# Patient Record
Sex: MALE | Race: WHITE | NOT HISPANIC OR LATINO | Employment: FULL TIME | URBAN - NONMETROPOLITAN AREA
[De-identification: names, ages, dates, MRNs, and addresses within clinical notes are randomized per-mention and may not be internally consistent; named-entity substitution may affect disease eponyms.]

---

## 2019-04-15 ENCOUNTER — NON-PROVIDER VISIT (OUTPATIENT)
Dept: URGENT CARE | Facility: PHYSICIAN GROUP | Age: 49
End: 2019-04-15

## 2019-04-15 DIAGNOSIS — Z02.1 PRE-EMPLOYMENT DRUG SCREENING: ICD-10-CM

## 2019-04-15 LAB
AMP AMPHETAMINE: NORMAL
COC COCAINE: NORMAL
INT CON NEG: NORMAL
INT CON POS: NORMAL
MET METHAMPHETAMINES: NORMAL
OPI OPIATES: NORMAL
PCP PHENCYCLIDINE: NORMAL
POC DRUG COMMENT 753798-OCCUPATIONAL HEALTH: NEGATIVE
THC: NORMAL

## 2019-04-15 PROCEDURE — 80305 DRUG TEST PRSMV DIR OPT OBS: CPT | Performed by: PHYSICIAN ASSISTANT

## 2019-08-27 ENCOUNTER — APPOINTMENT (OUTPATIENT)
Dept: URGENT CARE | Facility: PHYSICIAN GROUP | Age: 49
End: 2019-08-27

## 2022-02-20 ENCOUNTER — HOSPITAL ENCOUNTER (EMERGENCY)
Facility: MEDICAL CENTER | Age: 52
End: 2022-02-20
Attending: EMERGENCY MEDICINE
Payer: COMMERCIAL

## 2022-02-20 VITALS
HEIGHT: 72 IN | TEMPERATURE: 96.9 F | DIASTOLIC BLOOD PRESSURE: 77 MMHG | HEART RATE: 97 BPM | WEIGHT: 140 LBS | RESPIRATION RATE: 18 BRPM | SYSTOLIC BLOOD PRESSURE: 112 MMHG | BODY MASS INDEX: 18.96 KG/M2 | OXYGEN SATURATION: 99 %

## 2022-02-20 DIAGNOSIS — T50.901A ACCIDENTAL DRUG INGESTION, INITIAL ENCOUNTER: ICD-10-CM

## 2022-02-20 LAB — POC BREATHALIZER: 0 PERCENT (ref 0–0.01)

## 2022-02-20 PROCEDURE — 99284 EMERGENCY DEPT VISIT MOD MDM: CPT

## 2022-02-20 PROCEDURE — 302970 POC BREATHALIZER: Performed by: EMERGENCY MEDICINE

## 2022-02-21 NOTE — ED NOTES
Pt sleeping at this time. Pt is in no apparent distress; breathing is non-labored with equal rise and fall of chest wall. VS stable and will continue to monitor pt.

## 2022-02-21 NOTE — ED NOTES
Handoff report received from Pari MARK.    Pt sleeping. No apparent distress noted with non-labored breathing. Noted equal rise and fall of chest wall. VS stable and will continue to monitor pt.

## 2022-02-21 NOTE — ED NOTES
Pt remains asleep. No apparent acute distress noted and breathing is non-labored. Noted equal rise and fall of chest wall. VS stable and will continue to monitor pt.
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
## 2022-02-21 NOTE — ED NOTES
"Pt sleeping but easily woken up with verbal stimuli. Pt stated feeling \"a little bit better\" at this time. Pt currently denies any pain. Pt is in no apparent distress and breathing is non-labored. VS stable and will continue to monitor pt.  "

## 2022-02-21 NOTE — ED NOTES
Pt cleared for discharge by ERP.    Pt now alert and awake. Pt stated feeling better and verbalized wanting to go home. Pt discharged in stable condition. Discharge instructions given and explained to pt; verbalized understanding. Pt ambulated out of the ER with a steady and stable gait.
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
## 2022-05-12 ENCOUNTER — APPOINTMENT (OUTPATIENT)
Dept: RADIOLOGY | Facility: MEDICAL CENTER | Age: 52
End: 2022-05-12
Attending: EMERGENCY MEDICINE
Payer: COMMERCIAL

## 2022-05-12 ENCOUNTER — HOSPITAL ENCOUNTER (EMERGENCY)
Facility: MEDICAL CENTER | Age: 52
End: 2022-05-12
Attending: EMERGENCY MEDICINE
Payer: COMMERCIAL

## 2022-05-12 VITALS
SYSTOLIC BLOOD PRESSURE: 114 MMHG | WEIGHT: 130 LBS | RESPIRATION RATE: 15 BRPM | OXYGEN SATURATION: 93 % | DIASTOLIC BLOOD PRESSURE: 66 MMHG | HEART RATE: 97 BPM | HEIGHT: 68 IN | TEMPERATURE: 98.8 F | BODY MASS INDEX: 19.7 KG/M2

## 2022-05-12 DIAGNOSIS — D72.829 LEUKOCYTOSIS, UNSPECIFIED TYPE: ICD-10-CM

## 2022-05-12 DIAGNOSIS — R07.9 CHEST PAIN, UNSPECIFIED TYPE: ICD-10-CM

## 2022-05-12 DIAGNOSIS — R11.2 NON-INTRACTABLE VOMITING WITH NAUSEA, UNSPECIFIED VOMITING TYPE: ICD-10-CM

## 2022-05-12 DIAGNOSIS — L03.116 CELLULITIS OF LEFT LOWER EXTREMITY: ICD-10-CM

## 2022-05-12 LAB
ALBUMIN SERPL BCP-MCNC: 3.4 G/DL (ref 3.2–4.9)
ALBUMIN/GLOB SERPL: 1.4 G/DL
ALP SERPL-CCNC: 62 U/L (ref 30–99)
ALT SERPL-CCNC: 12 U/L (ref 2–50)
ANION GAP SERPL CALC-SCNC: 11 MMOL/L (ref 7–16)
AST SERPL-CCNC: 15 U/L (ref 12–45)
BASOPHILS # BLD AUTO: 0.2 % (ref 0–1.8)
BASOPHILS # BLD: 0.06 K/UL (ref 0–0.12)
BILIRUB SERPL-MCNC: 0.5 MG/DL (ref 0.1–1.5)
BUN SERPL-MCNC: 19 MG/DL (ref 8–22)
CALCIUM SERPL-MCNC: 8.4 MG/DL (ref 8.5–10.5)
CHLORIDE SERPL-SCNC: 95 MMOL/L (ref 96–112)
CO2 SERPL-SCNC: 24 MMOL/L (ref 20–33)
CREAT SERPL-MCNC: 0.95 MG/DL (ref 0.5–1.4)
EKG IMPRESSION: NORMAL
EOSINOPHIL # BLD AUTO: 0.25 K/UL (ref 0–0.51)
EOSINOPHIL NFR BLD: 1 % (ref 0–6.9)
ERYTHROCYTE [DISTWIDTH] IN BLOOD BY AUTOMATED COUNT: 38.8 FL (ref 35.9–50)
ETHANOL BLD-MCNC: <10.1 MG/DL
FLUAV RNA SPEC QL NAA+PROBE: NEGATIVE
FLUBV RNA SPEC QL NAA+PROBE: NEGATIVE
GFR SERPLBLD CREATININE-BSD FMLA CKD-EPI: 96 ML/MIN/1.73 M 2
GLOBULIN SER CALC-MCNC: 2.4 G/DL (ref 1.9–3.5)
GLUCOSE SERPL-MCNC: 120 MG/DL (ref 65–99)
HCT VFR BLD AUTO: 40.5 % (ref 42–52)
HGB BLD-MCNC: 14 G/DL (ref 14–18)
IMM GRANULOCYTES # BLD AUTO: 0.22 K/UL (ref 0–0.11)
IMM GRANULOCYTES NFR BLD AUTO: 0.9 % (ref 0–0.9)
LIPASE SERPL-CCNC: 11 U/L (ref 11–82)
LYMPHOCYTES # BLD AUTO: 1.69 K/UL (ref 1–4.8)
LYMPHOCYTES NFR BLD: 6.9 % (ref 22–41)
MCH RBC QN AUTO: 28.6 PG (ref 27–33)
MCHC RBC AUTO-ENTMCNC: 34.6 G/DL (ref 33.7–35.3)
MCV RBC AUTO: 82.7 FL (ref 81.4–97.8)
MONOCYTES # BLD AUTO: 1.63 K/UL (ref 0–0.85)
MONOCYTES NFR BLD AUTO: 6.6 % (ref 0–13.4)
NEUTROPHILS # BLD AUTO: 20.73 K/UL (ref 1.82–7.42)
NEUTROPHILS NFR BLD: 84.4 % (ref 44–72)
NRBC # BLD AUTO: 0 K/UL
NRBC BLD-RTO: 0 /100 WBC
PLATELET # BLD AUTO: 282 K/UL (ref 164–446)
PMV BLD AUTO: 8 FL (ref 9–12.9)
POTASSIUM SERPL-SCNC: 3.7 MMOL/L (ref 3.6–5.5)
PROT SERPL-MCNC: 5.8 G/DL (ref 6–8.2)
RBC # BLD AUTO: 4.9 M/UL (ref 4.7–6.1)
RSV RNA SPEC QL NAA+PROBE: NEGATIVE
SARS-COV-2 RNA RESP QL NAA+PROBE: NOTDETECTED
SODIUM SERPL-SCNC: 130 MMOL/L (ref 135–145)
SPECIMEN SOURCE: NORMAL
TROPONIN T SERPL-MCNC: <6 NG/L (ref 6–19)
TROPONIN T SERPL-MCNC: <6 NG/L (ref 6–19)
WBC # BLD AUTO: 24.6 K/UL (ref 4.8–10.8)

## 2022-05-12 PROCEDURE — 80053 COMPREHEN METABOLIC PANEL: CPT

## 2022-05-12 PROCEDURE — C9803 HOPD COVID-19 SPEC COLLECT: HCPCS | Performed by: EMERGENCY MEDICINE

## 2022-05-12 PROCEDURE — 85025 COMPLETE CBC W/AUTO DIFF WBC: CPT

## 2022-05-12 PROCEDURE — 83690 ASSAY OF LIPASE: CPT

## 2022-05-12 PROCEDURE — 0241U HCHG SARS-COV-2 COVID-19 NFCT DS RESP RNA 4 TRGT MIC: CPT

## 2022-05-12 PROCEDURE — 82077 ASSAY SPEC XCP UR&BREATH IA: CPT

## 2022-05-12 PROCEDURE — 84484 ASSAY OF TROPONIN QUANT: CPT

## 2022-05-12 PROCEDURE — 71045 X-RAY EXAM CHEST 1 VIEW: CPT

## 2022-05-12 PROCEDURE — 99285 EMERGENCY DEPT VISIT HI MDM: CPT

## 2022-05-12 PROCEDURE — 36415 COLL VENOUS BLD VENIPUNCTURE: CPT

## 2022-05-12 PROCEDURE — A9270 NON-COVERED ITEM OR SERVICE: HCPCS | Performed by: EMERGENCY MEDICINE

## 2022-05-12 PROCEDURE — 96374 THER/PROPH/DIAG INJ IV PUSH: CPT

## 2022-05-12 PROCEDURE — 93005 ELECTROCARDIOGRAM TRACING: CPT | Performed by: EMERGENCY MEDICINE

## 2022-05-12 PROCEDURE — 93005 ELECTROCARDIOGRAM TRACING: CPT

## 2022-05-12 PROCEDURE — 700102 HCHG RX REV CODE 250 W/ 637 OVERRIDE(OP): Performed by: EMERGENCY MEDICINE

## 2022-05-12 PROCEDURE — 96375 TX/PRO/DX INJ NEW DRUG ADDON: CPT

## 2022-05-12 PROCEDURE — 700111 HCHG RX REV CODE 636 W/ 250 OVERRIDE (IP): Performed by: EMERGENCY MEDICINE

## 2022-05-12 RX ORDER — SUCRALFATE 1 G/1
1 TABLET ORAL
Qty: 56 TABLET | Refills: 0 | Status: SHIPPED | OUTPATIENT
Start: 2022-05-12 | End: 2022-05-12 | Stop reason: SDUPTHER

## 2022-05-12 RX ORDER — FAMOTIDINE 20 MG/1
20 TABLET, FILM COATED ORAL 2 TIMES DAILY
Qty: 28 TABLET | Refills: 0 | Status: SHIPPED | OUTPATIENT
Start: 2022-05-12 | End: 2022-05-12 | Stop reason: SDUPTHER

## 2022-05-12 RX ORDER — FAMOTIDINE 20 MG/1
20 TABLET, FILM COATED ORAL 2 TIMES DAILY
Qty: 28 TABLET | Refills: 0 | Status: SHIPPED | OUTPATIENT
Start: 2022-05-12 | End: 2022-05-26

## 2022-05-12 RX ORDER — SUCRALFATE 1 G/1
1 TABLET ORAL
Qty: 56 TABLET | Refills: 0 | Status: SHIPPED | OUTPATIENT
Start: 2022-05-12 | End: 2022-05-26

## 2022-05-12 RX ORDER — CEPHALEXIN 500 MG/1
500 CAPSULE ORAL 4 TIMES DAILY
Qty: 20 CAPSULE | Refills: 0 | Status: SHIPPED | OUTPATIENT
Start: 2022-05-12 | End: 2022-05-17

## 2022-05-12 RX ORDER — CEPHALEXIN 500 MG/1
500 CAPSULE ORAL 4 TIMES DAILY
Qty: 20 CAPSULE | Refills: 0 | Status: SHIPPED | OUTPATIENT
Start: 2022-05-12 | End: 2022-05-12 | Stop reason: SDUPTHER

## 2022-05-12 RX ORDER — ONDANSETRON 2 MG/ML
4 INJECTION INTRAMUSCULAR; INTRAVENOUS ONCE
Status: COMPLETED | OUTPATIENT
Start: 2022-05-12 | End: 2022-05-12

## 2022-05-12 RX ADMIN — LIDOCAINE HYDROCHLORIDE 30 ML: 20 SOLUTION OROPHARYNGEAL at 02:00

## 2022-05-12 RX ADMIN — FAMOTIDINE 20 MG: 10 INJECTION INTRAVENOUS at 02:00

## 2022-05-12 RX ADMIN — ONDANSETRON 4 MG: 2 INJECTION INTRAMUSCULAR; INTRAVENOUS at 02:00

## 2022-05-12 NOTE — ED NOTES
Pt BIB [facility] via gurney to [facility]. Pt transferred over to [facility] via lift assist. Pt in gown and on monitor. EKG completed and presented to ERP.

## 2022-05-12 NOTE — ED PROVIDER NOTES
ED Provider Note    CHIEF COMPLAINT  Chief Complaint   Patient presents with   • Chest Pain     HPI  Patient is a 52-year-old male with a history of bipolar disorder and chronic skin psoriasis who presents emergency room for chest pain.  Patient states that approximately 2 hours ago he was having some difficulty getting to sleep and felt this midline chest discomfort.  He describes it as having varying characteristics including a intermittent burning sensation, aching sensation.  He states not radiating, occasionally is associated with upper abdominal discomfort though it has not been tonight.  He does not recall any associations with food ingestion and says that he has this pain roughly once a month and is been self resolved.  He is not had any exertional chest discomfort, no chest pressure sensations, no shortness of breath when going up flights of stairs or running.  He has no history of cardiac disease, is a non-smoker and denies any family history of cardiac disease.    Patient incidentally reports that he is had several sick exposures at the [facility], has had a nonproductive cough, some intermittent body aches prior to the chest pain starting.  He occasionally has chest wall pain in a different distribution with coughing episodes but says the pain from was  Different and felt more severe than prior episodes, prompting him to come in via EMS.    PPE Note: I personally donned full PPE for all patient encounters during this visit, including being clean-shaven with an N95 respirator mask, gloves, and goggles.     REVIEW OF SYSTEMS  See HPI for further details. All other systems are negative.     PAST MEDICAL HISTORY   has a past medical history of Bipolar 2 disorder (HCC) (2/23/2012) and Psoriasis.    SOCIAL HISTORY  Social History     Tobacco Use   • Smoking status: Former Smoker     Types: Cigarettes   • Smokeless tobacco: Never Used   Vaping Use   • Vaping Use: Never used   Substance and Sexual Activity   •  "Alcohol use: No   • Drug use: Yes     Comment: marijuana    • Sexual activity: Yes     Partners: Female     SURGICAL HISTORY  patient denies any surgical history    CURRENT MEDICATIONS  Home Medications    **Home medications have not yet been reviewed for this encounter**       ALLERGIES  No Known Allergies    PHYSICAL EXAM  VITAL SIGNS: /64   Pulse 90   Temp 36.8 °C (98.2 °F) (Temporal)   Resp (!) 21   Ht 1.727 m (5' 8\")   Wt 59 kg (130 lb)   SpO2 95%   BMI 19.77 kg/m²   Pulse ox interpretation: I interpret this pulse ox as normal.  Genl: M sitting in gurney comfortably, speaking clearly, appears in no acute distress  Head: NC/AT  ENT: Mucous membranes slightly dry, posterior pharynx clear, uvula midline, nares patent bilaterally   Eyes: Normal sclera, pupils equal round reactive to light  Neck: Supple, FROM, no LAD appreciated  Pulmonary: Lungs are clear to auscultation bilaterally  Chest: No TTP  CV:  RRR, no murmur appreciated, pulses 2+ in both upper and lower extremities,  Abdomen: soft, epigastric discomfort, no true Cevallos sign, no McBurney's point tenderness. ND; no rebound/guarding, no masses palpated, no HSM   : no CVA or suprapubic tenderness  Musculoskeletal: Pain free ROM of the neck. Moving upper and lower extremities and spontaneous in coordinated fashion  Neuro: A&Ox4 (person, place, time, situation), speech fluent, gait steady, no focal deficits appreciated  Skin: Left lower extremity has erythematous skin lesions, skin ulcerations without purulence, no bony tenderness on palpation.  No pallor or jaundice.  No cyanosis.  Warm and dry.     DIAGNOSTIC STUDIES / PROCEDURES    EKG  Results for orders placed or performed during the hospital encounter of 05/12/22   EKG   Result Value Ref Range    Report       [facility]    Test Date:  2022-05-12  Pt Name:    LUIS MANUEL ORTEGA              Department: ER  MRN:        [id number]                      Room:       " RD 07  Gender:     Male                         Technician: [id number]  :        1970                   Requested By:ER TRIAGE PROTOCOL  Order #:    [id number]                    Reading MD: Tobias Gamez MD    Measurements  Intervals                                Axis  Rate:       86                           P:          69  OH:         148                          QRS:        4  QRSD:       78                           T:          53  QT:         368  QTc:        440    Interpretive Statements  SINUS RHYTHM, rate of 86, normal axis, normal intervals, no acute ischemia or  infarction.  Electronically Signed On 2022 1:42:59 PDT by Tobias Gamez MD       LABS  Labs Reviewed   CBC WITH DIFFERENTIAL - Abnormal; Notable for the following components:       Result Value    WBC 24.6 (*)     Hematocrit 40.5 (*)     MPV 8.0 (*)     Neutrophils-Polys 84.40 (*)     Lymphocytes 6.90 (*)     Neutrophils (Absolute) 20.73 (*)     Monos (Absolute) 1.63 (*)     Immature Granulocytes (abs) 0.22 (*)     All other components within normal limits   COMP METABOLIC PANEL - Abnormal; Notable for the following components:    Sodium 130 (*)     Chloride 95 (*)     Glucose 120 (*)     Calcium 8.4 (*)     Total Protein 5.8 (*)     All other components within normal limits   TROPONIN   LIPASE   COV-2, FLU A/B, AND RSV BY PCR ([place])   DIAGNOSTIC ALCOHOL   ESTIMATED GFR   TROPONIN     RADIOLOGY  DX-CHEST-PORTABLE (1 VIEW)   Final Result         No acute cardiac or pulmonary abnormality is identified.        COURSE & MEDICAL DECISION MAKING  Pertinent Labs & Imaging studies reviewed. (See chart for details)    DDX:  ACS  Pneumothorax  Pulmonary embolism  Aortic dissection  Pneumonia  Myocarditis  Esophagitis  Pancreatitis  GERD  Anxiety    MDM    Initial evaluation at 0132:  Patient presents with a chief complaint of chest pain with my initial primary concern includes the differential listed above. Triage vital signs are reviewed and are  reassuring. PERC score is not used as the pain is not pleuritic nor is there hypoxia or tachypnea. There is no unilateral leg swelling, no concerning travel hx or hormone use.  Pulses are symmetrical, BP is reassuring.  I obtained intravenous access. I reviewed the patient's history reported in the chart. A chest xray obtained and is unremarkable for pneumonia, pneumothorax, or widened mediastinum on my read. Laboratory analysis is obtained to evaluate for myocardial ischemia, evidence of infection, electrolyte abnormality, and abdominal sources for a cause for the patient's chest pain which are reassuring for no acute signs of chest discomfort.  Following administration of his GI cocktail his pain is completely subsided.  Reevaluation of the bedside shows no chest discomfort or belly discomfort.  Patient does have an incidental finding of a leukocytosis of 20 with a leftward shift.  Patient has had some recent vomiting though he is not vomiting at this time, he has a left lower leg/shin cellulitis with no febrile illness, no bony tenderness, and no other areas of skin breakdown or evidence of abscess/cellulitis.  Initial dose of oral antibiotics is given here and well-tolerated.     Troponins x2 are negative. EKG shows no acute evidence of ischemia or infarction.   ?  As there are no significant laboratory findings, a nonischemic EKG, and troponins, it is felt that the patient is not experiencing acute myocardial infarction at this time. Based on the patient's history, physical exam, and laboratory analysis, the patient's most likely diagnosis is esophagitis or peptic ulcer disease and the patient be started on medications for this.  Additionally he can be given nausea medications for any vomiting syndromes.  The patient's leukocytosis is in the presence of no tachycardia, no febrile illness and he is resting comfortably with no acute pain complaints.  He is not septic and currently does not have findings  consistent with viral syndrome.  COVID and flu testing is negative.  I do believe for his left lower extremity cellulitis simple course of antibiotics would be warranted with his leukocytosis and I would recommend a repeat evaluation in 48 hours and establishment of care with the primary care doctor.  He feels well, he has no problems following up in outpatient setting, and understands my concerns regarding this lab finding and the need for prompt reevaluation should his condition acutely worsen.    HEART SCORE:1    FINAL IMPRESSION  Visit Diagnoses     ICD-10-CM   1. Chest pain, unspecified type  R07.9   2. Leukocytosis, unspecified type  D72.829   3. Non-intractable vomiting with nausea, unspecified vomiting type  R11.2   4. Cellulitis of left lower extremity  L03.116     Electronically signed by: Franco Collado M.D., 5/12/2022 1:32 AM

## 2022-05-12 NOTE — ED NOTES
MTM called on behalf of pt. Pt provided discharge instructions and follow-up information. Pt verbalized understanding and all questions answered. PIV removed. Pt ambulated from ED with steady gait carrying all belongings.

## 2022-05-12 NOTE — ED TRIAGE NOTES
"Chief Complaint   Patient presents with   • Chest Pain     Pt BIB EMS from [facility] for above complaint. Pt states sudden onset of CP about an hour ago to the left chest. Pt received 324 ASA and 0.4 nitro in route with slight relief. Pain is currently 8/10 and \"pounding\", pt states it is worse on inspiration and palpation. Pt endorses nausea but denies SOB. Pt has hx of anxiety. Protocol ordered, EKG at bedside.   "

## 2022-05-21 ENCOUNTER — HOSPITAL ENCOUNTER (EMERGENCY)
Facility: MEDICAL CENTER | Age: 52
End: 2022-05-21
Attending: EMERGENCY MEDICINE
Payer: COMMERCIAL

## 2022-05-21 ENCOUNTER — APPOINTMENT (OUTPATIENT)
Dept: RADIOLOGY | Facility: MEDICAL CENTER | Age: 52
End: 2022-05-21
Attending: EMERGENCY MEDICINE
Payer: COMMERCIAL

## 2022-05-21 VITALS
HEIGHT: 68 IN | RESPIRATION RATE: 12 BRPM | TEMPERATURE: 97.3 F | BODY MASS INDEX: 19.7 KG/M2 | SYSTOLIC BLOOD PRESSURE: 104 MMHG | DIASTOLIC BLOOD PRESSURE: 60 MMHG | HEART RATE: 68 BPM | WEIGHT: 130 LBS | OXYGEN SATURATION: 90 %

## 2022-05-21 DIAGNOSIS — J18.9 PNEUMONIA OF BOTH LOWER LOBES DUE TO INFECTIOUS ORGANISM: ICD-10-CM

## 2022-05-21 DIAGNOSIS — T50.901A ACCIDENTAL DRUG OVERDOSE, INITIAL ENCOUNTER: ICD-10-CM

## 2022-05-21 DIAGNOSIS — R41.82 ALTERED MENTAL STATUS, UNSPECIFIED ALTERED MENTAL STATUS TYPE: ICD-10-CM

## 2022-05-21 LAB
ALBUMIN SERPL BCP-MCNC: 3.3 G/DL (ref 3.2–4.9)
ALBUMIN/GLOB SERPL: 1.2 G/DL
ALP SERPL-CCNC: 151 U/L (ref 30–99)
ALT SERPL-CCNC: 32 U/L (ref 2–50)
ANION GAP SERPL CALC-SCNC: 12 MMOL/L (ref 7–16)
APAP SERPL-MCNC: <5 UG/ML (ref 10–30)
AST SERPL-CCNC: 52 U/L (ref 12–45)
BASOPHILS # BLD AUTO: 0.4 % (ref 0–1.8)
BASOPHILS # BLD: 0.04 K/UL (ref 0–0.12)
BILIRUB SERPL-MCNC: 0.3 MG/DL (ref 0.1–1.5)
BUN SERPL-MCNC: 16 MG/DL (ref 8–22)
CALCIUM SERPL-MCNC: 8 MG/DL (ref 8.5–10.5)
CHLORIDE SERPL-SCNC: 99 MMOL/L (ref 96–112)
CO2 SERPL-SCNC: 21 MMOL/L (ref 20–33)
CREAT SERPL-MCNC: 0.69 MG/DL (ref 0.5–1.4)
EOSINOPHIL # BLD AUTO: 0.13 K/UL (ref 0–0.51)
EOSINOPHIL NFR BLD: 1.4 % (ref 0–6.9)
ERYTHROCYTE [DISTWIDTH] IN BLOOD BY AUTOMATED COUNT: 39.6 FL (ref 35.9–50)
ETHANOL BLD-MCNC: <10.1 MG/DL
GFR SERPLBLD CREATININE-BSD FMLA CKD-EPI: 110 ML/MIN/1.73 M 2
GLOBULIN SER CALC-MCNC: 2.7 G/DL (ref 1.9–3.5)
GLUCOSE SERPL-MCNC: 174 MG/DL (ref 65–99)
HCT VFR BLD AUTO: 35.7 % (ref 42–52)
HGB BLD-MCNC: 12 G/DL (ref 14–18)
IMM GRANULOCYTES # BLD AUTO: 0.09 K/UL (ref 0–0.11)
IMM GRANULOCYTES NFR BLD AUTO: 1 % (ref 0–0.9)
LYMPHOCYTES # BLD AUTO: 1.69 K/UL (ref 1–4.8)
LYMPHOCYTES NFR BLD: 18.8 % (ref 22–41)
MCH RBC QN AUTO: 28.4 PG (ref 27–33)
MCHC RBC AUTO-ENTMCNC: 33.6 G/DL (ref 33.7–35.3)
MCV RBC AUTO: 84.4 FL (ref 81.4–97.8)
MONOCYTES # BLD AUTO: 1.01 K/UL (ref 0–0.85)
MONOCYTES NFR BLD AUTO: 11.2 % (ref 0–13.4)
NEUTROPHILS # BLD AUTO: 6.02 K/UL (ref 1.82–7.42)
NEUTROPHILS NFR BLD: 67.2 % (ref 44–72)
NRBC # BLD AUTO: 0 K/UL
NRBC BLD-RTO: 0 /100 WBC
PLATELET # BLD AUTO: 260 K/UL (ref 164–446)
PMV BLD AUTO: 8 FL (ref 9–12.9)
POTASSIUM SERPL-SCNC: 3.2 MMOL/L (ref 3.6–5.5)
PROT SERPL-MCNC: 6 G/DL (ref 6–8.2)
RBC # BLD AUTO: 4.23 M/UL (ref 4.7–6.1)
SALICYLATES SERPL-MCNC: <1 MG/DL (ref 15–25)
SODIUM SERPL-SCNC: 132 MMOL/L (ref 135–145)
WBC # BLD AUTO: 9 K/UL (ref 4.8–10.8)

## 2022-05-21 PROCEDURE — 85025 COMPLETE CBC W/AUTO DIFF WBC: CPT

## 2022-05-21 PROCEDURE — 94760 N-INVAS EAR/PLS OXIMETRY 1: CPT

## 2022-05-21 PROCEDURE — 80053 COMPREHEN METABOLIC PANEL: CPT

## 2022-05-21 PROCEDURE — 80179 DRUG ASSAY SALICYLATE: CPT

## 2022-05-21 PROCEDURE — 36415 COLL VENOUS BLD VENIPUNCTURE: CPT

## 2022-05-21 PROCEDURE — 700111 HCHG RX REV CODE 636 W/ 250 OVERRIDE (IP)

## 2022-05-21 PROCEDURE — 99285 EMERGENCY DEPT VISIT HI MDM: CPT

## 2022-05-21 PROCEDURE — 700102 HCHG RX REV CODE 250 W/ 637 OVERRIDE(OP): Performed by: EMERGENCY MEDICINE

## 2022-05-21 PROCEDURE — 80143 DRUG ASSAY ACETAMINOPHEN: CPT

## 2022-05-21 PROCEDURE — 82077 ASSAY SPEC XCP UR&BREATH IA: CPT

## 2022-05-21 PROCEDURE — 700105 HCHG RX REV CODE 258: Performed by: EMERGENCY MEDICINE

## 2022-05-21 PROCEDURE — 71045 X-RAY EXAM CHEST 1 VIEW: CPT

## 2022-05-21 PROCEDURE — A9270 NON-COVERED ITEM OR SERVICE: HCPCS | Performed by: EMERGENCY MEDICINE

## 2022-05-21 RX ORDER — NALOXONE HYDROCHLORIDE 1 MG/ML
INJECTION INTRAMUSCULAR; INTRAVENOUS; SUBCUTANEOUS
Status: COMPLETED
Start: 2022-05-21 | End: 2022-05-21

## 2022-05-21 RX ORDER — SODIUM CHLORIDE 9 MG/ML
1000 INJECTION, SOLUTION INTRAVENOUS ONCE
Status: DISCONTINUED | OUTPATIENT
Start: 2022-05-21 | End: 2022-05-21

## 2022-05-21 RX ORDER — ONDANSETRON 2 MG/ML
4 INJECTION INTRAMUSCULAR; INTRAVENOUS ONCE
Status: DISCONTINUED | OUTPATIENT
Start: 2022-05-21 | End: 2022-05-21

## 2022-05-21 RX ORDER — POTASSIUM CHLORIDE 20 MEQ/1
40 TABLET, EXTENDED RELEASE ORAL ONCE
Status: COMPLETED | OUTPATIENT
Start: 2022-05-21 | End: 2022-05-21

## 2022-05-21 RX ORDER — ONDANSETRON 4 MG/1
4 TABLET, ORALLY DISINTEGRATING ORAL ONCE
Status: COMPLETED | OUTPATIENT
Start: 2022-05-21 | End: 2022-05-21

## 2022-05-21 RX ORDER — NALOXONE HYDROCHLORIDE 1 MG/ML
2 INJECTION INTRAMUSCULAR; INTRAVENOUS; SUBCUTANEOUS ONCE
Status: DISCONTINUED | OUTPATIENT
Start: 2022-05-21 | End: 2022-05-21

## 2022-05-21 RX ORDER — ONDANSETRON 2 MG/ML
INJECTION INTRAMUSCULAR; INTRAVENOUS
Status: COMPLETED
Start: 2022-05-21 | End: 2022-05-21

## 2022-05-21 RX ORDER — NALOXONE HYDROCHLORIDE 0.4 MG/ML
2 INJECTION, SOLUTION INTRAMUSCULAR; INTRAVENOUS; SUBCUTANEOUS ONCE
Status: DISCONTINUED | OUTPATIENT
Start: 2022-05-21 | End: 2022-05-21 | Stop reason: HOSPADM

## 2022-05-21 RX ORDER — DOXYCYCLINE 100 MG/1
100 CAPSULE ORAL 2 TIMES DAILY
Qty: 14 CAPSULE | Refills: 0 | Status: SHIPPED | OUTPATIENT
Start: 2022-05-21 | End: 2022-05-28

## 2022-05-21 RX ORDER — ONDANSETRON 2 MG/ML
4 INJECTION INTRAMUSCULAR; INTRAVENOUS ONCE
Status: DISCONTINUED | OUTPATIENT
Start: 2022-05-21 | End: 2022-05-21 | Stop reason: HOSPADM

## 2022-05-21 RX ORDER — SODIUM CHLORIDE 9 MG/ML
1000 INJECTION, SOLUTION INTRAVENOUS ONCE
Status: COMPLETED | OUTPATIENT
Start: 2022-05-21 | End: 2022-05-21

## 2022-05-21 RX ORDER — DOXYCYCLINE 100 MG/1
100 TABLET ORAL ONCE
Status: COMPLETED | OUTPATIENT
Start: 2022-05-21 | End: 2022-05-21

## 2022-05-21 RX ADMIN — DOXYCYCLINE 100 MG: 100 TABLET, FILM COATED ORAL at 12:31

## 2022-05-21 RX ADMIN — POTASSIUM CHLORIDE 40 MEQ: 1500 TABLET, EXTENDED RELEASE ORAL at 12:31

## 2022-05-21 RX ADMIN — ONDANSETRON 4 MG: 4 TABLET, ORALLY DISINTEGRATING ORAL at 12:31

## 2022-05-21 RX ADMIN — SODIUM CHLORIDE 1000 ML: 9 INJECTION, SOLUTION INTRAVENOUS at 07:15

## 2022-05-21 ASSESSMENT — FIBROSIS 4 INDEX: FIB4 SCORE: 0.81

## 2022-05-21 NOTE — ED NOTES
Patient able to eat crackers and drink water at this time. Patient using urinal. Dr. Klein notified.

## 2022-05-21 NOTE — ED PROVIDER NOTES
"ED Provider Note    CHIEF COMPLAINT  Chief Complaint   Patient presents with   • Drug Overdose     BIBA after staff and [facility] found pt unresponsive and started CPR. Per [facility], strong pulses present on their arrival. Pt given 2mg Narcan intranasal and 2mg IV in route. Pt arrives gcs 15, A&Ox4.        HPI  Dio Ramirez is a 52 y.o. male who presents to the emergency department by ambulance from [facility] for altered mental status.  Patient was found unresponsive by [facility] staff, CPR was in progress with EMS arrival.  Upon arrival he had a \"bounding pulse\" but was hypoxic to 20% and unresponsive.  He was hypotensive as well.  Intranasal Narcan with out improvement in mentation, but some noted improvement to blood pressure.  Once IV was established she received 2 mg of Narcan intravenously with immediate response, patient became more alert and awake.  He did vomit once there after, Zofran was given as well.  Blood pressure improved.    HPI is limited due to patient altered mental status.  Upon arrival to the ED, patient admits to \"probably\" when asked about drug use, but states he normally only uses marijuana.  Denies alcohol use.    REVIEW OF SYSTEMS  See HPI for further details.  Somewhat limited due to patient altered mental status    PAST MEDICAL HISTORY   has a past medical history of Bipolar 2 disorder (HCC) (2/23/2012) and Psoriasis.    SOCIAL HISTORY  Social History     Tobacco Use   • Smoking status: Former Smoker     Types: Cigarettes   • Smokeless tobacco: Never Used   Vaping Use   • Vaping Use: Never used   Substance and Sexual Activity   • Alcohol use: No   • Drug use: Yes     Comment: marijuana, unknown narcotic   • Sexual activity: Yes     Partners: Female       SURGICAL HISTORY  patient denies any surgical history    CURRENT MEDICATIONS  Home Medications    **Home medications have not yet been reviewed for this encounter**         ALLERGIES  No Known Allergies    PHYSICAL EXAM  VITAL " "SIGNS: /57   Pulse (!) 58   Temp 36.2 °C (97.2 °F) (Temporal)   Resp (!) 10   Ht 1.727 m (5' 8\")   Wt 59 kg (130 lb)   SpO2 98%   BMI 19.77 kg/m²   Pulse ox interpretation: I interpret this pulse ox as normal.  Constitutional: Somnolent but arousable.  Poor historian.  Disheveled.  HENT: Normocephalic, atraumatic. Bilateral external ears normal, Nose normal.  Dry mucous membranes.    Eyes: Pupils are equal and reactive, Conjunctiva normal.   Neck: Normal range of motion, Supple  Lymphatic: No lymphadenopathy noted.   Cardiovascular: Regular rate and rhythm, no murmurs. Distal pulses intact.  No peripheral edema.  Thorax & Lungs: Coarse bilaterally.  No wheezing/rales/ronchi. No increased work of breathing, clipped speech or retractions.   Abdomen: Soft, non-distended, non-tender to palpation.   Skin: Warm, Dry, No erythema, No rash.   Musculoskeletal: Good range of motion in all major joints. No major deformities noted.   Neurologic: A somnolent but arousable.  Answers questions appropriately.  Moves 4 extremities spontaneously.  Psychiatric: Somnolent.  Cooperative.    DIAGNOSTIC STUDIES / PROCEDURES    LABS  Results for orders placed or performed during the hospital encounter of 05/21/22   Blood Alcohol   Result Value Ref Range    Diagnostic Alcohol <10.1 <10.1 mg/dL   CBC WITH DIFFERENTIAL   Result Value Ref Range    WBC 9.0 4.8 - 10.8 K/uL    RBC 4.23 (L) 4.70 - 6.10 M/uL    Hemoglobin 12.0 (L) 14.0 - 18.0 g/dL    Hematocrit 35.7 (L) 42.0 - 52.0 %    MCV 84.4 81.4 - 97.8 fL    MCH 28.4 27.0 - 33.0 pg    MCHC 33.6 (L) 33.7 - 35.3 g/dL    RDW 39.6 35.9 - 50.0 fL    Platelet Count 260 164 - 446 K/uL    MPV 8.0 (L) 9.0 - 12.9 fL    Neutrophils-Polys 67.20 44.00 - 72.00 %    Lymphocytes 18.80 (L) 22.00 - 41.00 %    Monocytes 11.20 0.00 - 13.40 %    Eosinophils 1.40 0.00 - 6.90 %    Basophils 0.40 0.00 - 1.80 %    Immature Granulocytes 1.00 (H) 0.00 - 0.90 %    Nucleated RBC 0.00 /100 WBC    Neutrophils " (Absolute) 6.02 1.82 - 7.42 K/uL    Lymphs (Absolute) 1.69 1.00 - 4.80 K/uL    Monos (Absolute) 1.01 (H) 0.00 - 0.85 K/uL    Eos (Absolute) 0.13 0.00 - 0.51 K/uL    Baso (Absolute) 0.04 0.00 - 0.12 K/uL    Immature Granulocytes (abs) 0.09 0.00 - 0.11 K/uL    NRBC (Absolute) 0.00 K/uL   COMP METABOLIC PANEL   Result Value Ref Range    Sodium 132 (L) 135 - 145 mmol/L    Potassium 3.2 (L) 3.6 - 5.5 mmol/L    Chloride 99 96 - 112 mmol/L    Co2 21 20 - 33 mmol/L    Anion Gap 12.0 7.0 - 16.0    Glucose 174 (H) 65 - 99 mg/dL    Bun 16 8 - 22 mg/dL    Creatinine 0.69 0.50 - 1.40 mg/dL    Calcium 8.0 (L) 8.5 - 10.5 mg/dL    AST(SGOT) 52 (H) 12 - 45 U/L    ALT(SGPT) 32 2 - 50 U/L    Alkaline Phosphatase 151 (H) 30 - 99 U/L    Total Bilirubin 0.3 0.1 - 1.5 mg/dL    Albumin 3.3 3.2 - 4.9 g/dL    Total Protein 6.0 6.0 - 8.2 g/dL    Globulin 2.7 1.9 - 3.5 g/dL    A-G Ratio 1.2 g/dL   Acetaminophen Level   Result Value Ref Range    Acetaminophen -Tylenol <5.0 (L) 10.0 - 30.0 ug/mL   SALICYLATE LEVEL   Result Value Ref Range    Salicylates, Quant. <1.0 (L) 15.0 - 25.0 mg/dL   ESTIMATED GFR   Result Value Ref Range    GFR (CKD-EPI) 110 >60 mL/min/1.73 m 2     RADIOLOGY  DX-CHEST-PORTABLE (1 VIEW)   Final Result         1.  New patchy opacities in the bilateral lung bases compatible with infiltrates.          COURSE & MEDICAL DECISION MAKING  Nursing notes and vital signs were reviewed. (See chart for details)  The patients records were reviewed, history was obtained from the patient ;     Patient seen evaluated at bedside.  Somnolent but arousable.  Suspect drug overdose given response to Narcan.  Hemodynamically stable.  Add labs, IV fluid.  Observe.    0700 on 5/21/2022 patient placed in ED observation for continued observation following drug overdose requiring Narcan.  Family history: Unknown    0900 -arousable returns to sleep.  No further vomiting.  Labs really quite unremarkable, he does have mild hypokalemia, potassium 3.2,  will be supplemented orally when tolerating such.  No other electrolyte derangement.  Tox screen negative.  Chest x-ray does suggest new patchy opacities in the bilateral lung bases compatible with infiltrate.  Previous chest x-ray without such 10 days ago.  On further questioning patient does admit to some productive cough, denies fevers.  He did vomit today but with EMS, less likely aspiration.  He will be treated empirically for commune acquired pneumonia with doxycycline.    1100 -patient more alert again.  Will p.o. challenge.  Oxygen titrated off, will observe for hypoxia.    1200 -tolerated food and fluids.  Room air saturations for 1 hour without desaturation.  No acute respiratory distress.    Evaluation for altered mental status is most suggestive of drug overdose given response to Narcan.  He was somnolent but otherwise neurologically intact and nonfocal in the emergency department.  Mentation and somnolence has improved significantly during observation.  Tolerated food and fluids as well as oral medications.  Hemodynamically stable without fever, tachycardia, hypotension, no hypoxia before discharge.  No acute respiratory distress.  No clinical evidence for sepsis.    Mr. Ramirez was here with a suspected overdose of T40.2 - Other opioids; he has no other known overdoses.      Patient is stable for discharge at this time, anticipatory guidance provided, doxycycline for 7 days, close follow-up is encouraged, and strict ED return instructions have been detailed. Patient is agreeable to the disposition and plan.    The total critical care time on this patient is 40 minutes, immediate and continuous hemodynamic monitoring and multiple bedside evaluations, resuscitating patient and assessing response to treatment, deciphering test results, arranging for discharge after prolonged observation following drug overdose.  This 40 minutes is exclusive of separately billable procedures.    1220 on 5/21/2022 patient is  discharged from ED observation in improved, stable condition to self.    FINAL IMPRESSION  (T50.901A) Accidental drug overdose, initial encounter  (R41.82) Altered mental status, unspecified altered mental status type  (J18.9) Pneumonia of both lower lobes due to infectious organism      Electronically signed by: Juana Klein D.O., 5/21/2022 11:57 AM      This dictation was created using voice recognition software. The accuracy of the dictation is limited to the abilities of the software. I expect there may be some errors of grammar and possibly content. The nursing notes were reviewed and certain aspects of this information were incorporated into this note.

## 2022-05-21 NOTE — ED NOTES
Emesis care provided. Patient resting calmly on gurney. Call light within reach. Side rails up and locked.

## 2022-05-21 NOTE — ED TRIAGE NOTES
"  Chief Complaint   Patient presents with   • Drug Overdose     BIBA after staff and [facility] found pt unresponsive and started CPR. Per [facility], strong pulses present on their arrival. Pt given 2mg Narcan intranasal and 2mg IV in route. Pt arrives gcs 15, A&Ox4.      ./70   Pulse 72   Temp 36.2 °C (97.2 °F) (Temporal)   Resp 13   Ht 1.727 m (5' 8\")   Wt 59 kg (130 lb)   SpO2 99%   BMI 19.77 kg/m²     "

## 2022-05-21 NOTE — ED NOTES
Rounded on patient. Patient resting in bed. No signs of distress noted. Equal chest rise and fall. Call light within reach.

## 2022-05-21 NOTE — ED NOTES
Attempted PO challenge. Patient unable to maintain adequate arousal to eat crackers. Patient able to drink liquid without emesis. Dr. Klein notified patient is unable to complete PO challenge due to arousal level at this time.

## 2022-05-21 NOTE — ED NOTES
Medicated patient per MAR. Patient provided discharge instructions. Patient verbalized understanding. Patient leaving ER in stable condition. Wristband and IV removed.

## 2022-05-21 NOTE — DISCHARGE INSTRUCTIONS
Follow-up with primary care next week for reevaluation, to establish care.    Do not use illicit drugs.    Take doxycycline twice daily for 7 days for pneumonia.    Return to the emergency department for altered mental status, seizure, difficulty breathing, fever, vomiting or other new concerns.

## 2022-07-30 ENCOUNTER — HOSPITAL ENCOUNTER (EMERGENCY)
Facility: MEDICAL CENTER | Age: 52
End: 2022-07-31
Attending: EMERGENCY MEDICINE
Payer: COMMERCIAL

## 2022-07-30 ENCOUNTER — APPOINTMENT (OUTPATIENT)
Dept: RADIOLOGY | Facility: MEDICAL CENTER | Age: 52
End: 2022-07-30
Attending: EMERGENCY MEDICINE
Payer: COMMERCIAL

## 2022-07-30 DIAGNOSIS — T50.901A ACCIDENTAL DRUG OVERDOSE, INITIAL ENCOUNTER: ICD-10-CM

## 2022-07-30 PROCEDURE — 99285 EMERGENCY DEPT VISIT HI MDM: CPT

## 2022-07-30 PROCEDURE — 93005 ELECTROCARDIOGRAM TRACING: CPT | Performed by: EMERGENCY MEDICINE

## 2022-07-30 PROCEDURE — 36415 COLL VENOUS BLD VENIPUNCTURE: CPT

## 2022-07-30 PROCEDURE — 71045 X-RAY EXAM CHEST 1 VIEW: CPT

## 2022-07-30 ASSESSMENT — FIBROSIS 4 INDEX: FIB4 SCORE: 1.87

## 2022-07-31 ENCOUNTER — HOSPITAL ENCOUNTER (EMERGENCY)
Facility: MEDICAL CENTER | Age: 52
End: 2022-07-31
Attending: EMERGENCY MEDICINE
Payer: COMMERCIAL

## 2022-07-31 ENCOUNTER — PHARMACY VISIT (OUTPATIENT)
Dept: PHARMACY | Facility: MEDICAL CENTER | Age: 52
End: 2022-07-31
Payer: COMMERCIAL

## 2022-07-31 VITALS
HEIGHT: 68 IN | WEIGHT: 130 LBS | TEMPERATURE: 97.9 F | RESPIRATION RATE: 37 BRPM | BODY MASS INDEX: 19.7 KG/M2 | HEART RATE: 65 BPM | DIASTOLIC BLOOD PRESSURE: 72 MMHG | SYSTOLIC BLOOD PRESSURE: 106 MMHG | OXYGEN SATURATION: 92 %

## 2022-07-31 VITALS
TEMPERATURE: 97.1 F | WEIGHT: 130 LBS | HEIGHT: 68 IN | BODY MASS INDEX: 19.7 KG/M2 | RESPIRATION RATE: 18 BRPM | HEART RATE: 64 BPM | DIASTOLIC BLOOD PRESSURE: 74 MMHG | OXYGEN SATURATION: 92 % | SYSTOLIC BLOOD PRESSURE: 111 MMHG

## 2022-07-31 DIAGNOSIS — T50.901A ACCIDENTAL DRUG OVERDOSE, INITIAL ENCOUNTER: ICD-10-CM

## 2022-07-31 DIAGNOSIS — T40.601A OPIATE OVERDOSE, ACCIDENTAL OR UNINTENTIONAL, INITIAL ENCOUNTER (HCC): ICD-10-CM

## 2022-07-31 LAB
ALBUMIN SERPL BCP-MCNC: 4.2 G/DL (ref 3.2–4.9)
ALBUMIN/GLOB SERPL: 1.4 G/DL
ALP SERPL-CCNC: 76 U/L (ref 30–99)
ALT SERPL-CCNC: 26 U/L (ref 2–50)
ANION GAP SERPL CALC-SCNC: 13 MMOL/L (ref 7–16)
AST SERPL-CCNC: 37 U/L (ref 12–45)
BASOPHILS # BLD AUTO: 0.4 % (ref 0–1.8)
BASOPHILS # BLD: 0.07 K/UL (ref 0–0.12)
BILIRUB SERPL-MCNC: 0.3 MG/DL (ref 0.1–1.5)
BUN SERPL-MCNC: 17 MG/DL (ref 8–22)
CALCIUM SERPL-MCNC: 8.8 MG/DL (ref 8.5–10.5)
CHLORIDE SERPL-SCNC: 106 MMOL/L (ref 96–112)
CO2 SERPL-SCNC: 22 MMOL/L (ref 20–33)
CREAT SERPL-MCNC: 0.9 MG/DL (ref 0.5–1.4)
EKG IMPRESSION: NORMAL
EOSINOPHIL # BLD AUTO: 0.27 K/UL (ref 0–0.51)
EOSINOPHIL NFR BLD: 1.6 % (ref 0–6.9)
ERYTHROCYTE [DISTWIDTH] IN BLOOD BY AUTOMATED COUNT: 39.6 FL (ref 35.9–50)
GFR SERPLBLD CREATININE-BSD FMLA CKD-EPI: 102 ML/MIN/1.73 M 2
GLOBULIN SER CALC-MCNC: 3 G/DL (ref 1.9–3.5)
GLUCOSE SERPL-MCNC: 221 MG/DL (ref 65–99)
HCT VFR BLD AUTO: 37.5 % (ref 42–52)
HGB BLD-MCNC: 12.5 G/DL (ref 14–18)
IMM GRANULOCYTES # BLD AUTO: 0.11 K/UL (ref 0–0.11)
IMM GRANULOCYTES NFR BLD AUTO: 0.7 % (ref 0–0.9)
LYMPHOCYTES # BLD AUTO: 1.45 K/UL (ref 1–4.8)
LYMPHOCYTES NFR BLD: 8.7 % (ref 22–41)
MCH RBC QN AUTO: 28.6 PG (ref 27–33)
MCHC RBC AUTO-ENTMCNC: 33.3 G/DL (ref 33.7–35.3)
MCV RBC AUTO: 85.8 FL (ref 81.4–97.8)
MONOCYTES # BLD AUTO: 0.81 K/UL (ref 0–0.85)
MONOCYTES NFR BLD AUTO: 4.8 % (ref 0–13.4)
NEUTROPHILS # BLD AUTO: 14.04 K/UL (ref 1.82–7.42)
NEUTROPHILS NFR BLD: 83.8 % (ref 44–72)
NRBC # BLD AUTO: 0 K/UL
NRBC BLD-RTO: 0 /100 WBC
PLATELET # BLD AUTO: 325 K/UL (ref 164–446)
PMV BLD AUTO: 8.5 FL (ref 9–12.9)
POTASSIUM SERPL-SCNC: 3.5 MMOL/L (ref 3.6–5.5)
PROT SERPL-MCNC: 7.2 G/DL (ref 6–8.2)
RBC # BLD AUTO: 4.37 M/UL (ref 4.7–6.1)
SODIUM SERPL-SCNC: 141 MMOL/L (ref 135–145)
WBC # BLD AUTO: 16.8 K/UL (ref 4.8–10.8)

## 2022-07-31 PROCEDURE — 85025 COMPLETE CBC W/AUTO DIFF WBC: CPT

## 2022-07-31 PROCEDURE — RXMED WILLOW AMBULATORY MEDICATION CHARGE: Performed by: EMERGENCY MEDICINE

## 2022-07-31 PROCEDURE — 96374 THER/PROPH/DIAG INJ IV PUSH: CPT

## 2022-07-31 PROCEDURE — 96376 TX/PRO/DX INJ SAME DRUG ADON: CPT

## 2022-07-31 PROCEDURE — 99284 EMERGENCY DEPT VISIT MOD MDM: CPT

## 2022-07-31 PROCEDURE — 80053 COMPREHEN METABOLIC PANEL: CPT

## 2022-07-31 PROCEDURE — 700105 HCHG RX REV CODE 258: Performed by: EMERGENCY MEDICINE

## 2022-07-31 PROCEDURE — 700111 HCHG RX REV CODE 636 W/ 250 OVERRIDE (IP): Performed by: EMERGENCY MEDICINE

## 2022-07-31 RX ORDER — ONDANSETRON 2 MG/ML
4 INJECTION INTRAMUSCULAR; INTRAVENOUS ONCE
Status: COMPLETED | OUTPATIENT
Start: 2022-07-31 | End: 2022-07-31

## 2022-07-31 RX ORDER — SODIUM CHLORIDE 9 MG/ML
1000 INJECTION, SOLUTION INTRAVENOUS ONCE
Status: COMPLETED | OUTPATIENT
Start: 2022-07-31 | End: 2022-07-31

## 2022-07-31 RX ORDER — NALOXONE HYDROCHLORIDE 4 MG/.1ML
SPRAY NASAL
Qty: 2 EACH | Refills: 0 | Status: SHIPPED | OUTPATIENT
Start: 2022-07-31 | End: 2022-12-11

## 2022-07-31 RX ADMIN — SODIUM CHLORIDE 1000 ML: 9 INJECTION, SOLUTION INTRAVENOUS at 01:33

## 2022-07-31 RX ADMIN — ONDANSETRON HYDROCHLORIDE 4 MG: 2 SOLUTION INTRAMUSCULAR; INTRAVENOUS at 03:59

## 2022-07-31 RX ADMIN — ONDANSETRON HYDROCHLORIDE 4 MG: 2 INJECTION, SOLUTION INTRAMUSCULAR; INTRAVENOUS at 01:33

## 2022-07-31 ASSESSMENT — ENCOUNTER SYMPTOMS
NAUSEA: 1
COUGH: 0
SORE THROAT: 0
BLURRED VISION: 0
SEIZURES: 0
FOCAL WEAKNESS: 0
BACK PAIN: 0
EYE REDNESS: 0
LOSS OF CONSCIOUSNESS: 1
SHORTNESS OF BREATH: 0
ABDOMINAL PAIN: 0
FEVER: 0
CHILLS: 0
HEADACHES: 0
NECK PAIN: 0
VOMITING: 0

## 2022-07-31 ASSESSMENT — FIBROSIS 4 INDEX: FIB4 SCORE: 1.18

## 2022-07-31 ASSESSMENT — LIFESTYLE VARIABLES: SUBSTANCE_ABUSE: 1

## 2022-07-31 NOTE — ED PROVIDER NOTES
"ED Provider Note    CHIEF COMPLAINT  Chief Complaint   Patient presents with   • Drug Overdose     X 1 hour. PT found slumped over slot machine at [facility] by staff, unresponsive and apneic upon EMS arrival. PT initially had SPO2 of 10% on RA, EMS administered 2mg IM Narcan at 0828, SPO2 back to 92% on RA. PT Aox4 at this time, expressing no complaints. PT reports he snorted 1 line of meth prior to LOC. PT reports he was discharged yesterday for OD as well. CBS was 287 per EMS.       HPI  Dio Ramirez is a 52 y.o. male who presents to the emergency department by ambulance from the [facility] for altered mental status.  Patient was found unresponsive, hunched over a slot machine.  For EMS he had oxygen saturations of 10%.  He was given 2 mg of IM Narcan with response, he became alert, oxygen saturations returned to the 90%.    Patient denies any concerns.    Patient admits to smoking methamphetamine, he presumes there was fentanyl laced with it as well.  Denies IV drug use.  He was seen here yesterday for the same.    REVIEW OF SYSTEMS  See HPI for further details. All other systems are negative.     PAST MEDICAL HISTORY   has a past medical history of Bipolar 2 disorder (HCC) (2/23/2012) and Psoriasis.    SOCIAL HISTORY  Social History     Tobacco Use   • Smoking status: Former Smoker     Types: Cigarettes   • Smokeless tobacco: Never Used   Vaping Use   • Vaping Use: Never used   Substance and Sexual Activity   • Alcohol use: No   • Drug use: Yes     Comment: meth 7/30, unknown narcotic 7/30   • Sexual activity: Yes     Partners: Female       SURGICAL HISTORY  patient denies any surgical history    CURRENT MEDICATIONS  Home Medications    **Home medications have not yet been reviewed for this encounter**         ALLERGIES  No Known Allergies    PHYSICAL EXAM  VITAL SIGNS: /75   Pulse 67   Resp (!) 24   Ht 1.727 m (5' 8\")   Wt 59 kg (130 lb)   SpO2 94%   BMI 19.77 kg/m²   Pulse ox interpretation: I " interpret this pulse ox as normal.  Constitutional: Alert in no apparent distress.  HENT: Normocephalic, atraumatic. Bilateral external ears normal, Nose normal.  Eyes: Pupils are equal and reactive, Conjunctiva normal.   Neck: Normal range of motion, Supple  Lymphatic: No lymphadenopathy noted.   Cardiovascular: Regular rate and rhythm, no murmurs. Distal pulses intact.    Thorax & Lungs: Normal breath sounds.  No wheezing/rales/ronchi. No increased work of breathing  Abdomen: Soft, non-distended, non-tender to palpation.   Skin: Warm, Dry, No erythema, No rash.   Musculoskeletal: Good range of motion in all major joints.   Neurologic: Alert and orient x4.  Speech clear and cohesive.  Moves 4 extremity spontaneously.  Psychiatric: Affect normal, Mood normal.       COURSE & MEDICAL DECISION MAKING  Patient seen evaluated bedside.  Awake and alert, oriented x4.  Moves 4 extremity spontaneously.  Admits to smoking methamphetamine which she presumed also had fentanyl in it.  He states this is happened to him before.  He does not have naloxone, but agreeable to pharmacy evaluation for such before discharge today.  Denies any other concerns.  Hemodynamically stable.  No hypoxia on room air.    0855 on 7/31/2022 patient is placed in ED observation for ongoing monitoring following fentanyl overdose and Narcan administration.  Family history: Patient denies    1000 -patient is awake and alert.  Tolerating oral fluids.    12:20 PM patient reevaluated bedside.  Awake and alert.  Tolerated food and fluids.  Ambulates independently.  Hemodynamically stable without any somnolence, hypoxia or respiratory depression.  Pharmacy will discuss naloxone therapy with patient prior to discharge.  He is agreeable to this at this time.    Patient is stable for discharge at this time, anticipatory guidance provided, naloxone as indicated, close follow-up is encouraged, and strict ED return instructions have been detailed. Patient is  agreeable to the disposition and plan.    12:20 PM on 7/31/2022 patient is discharged from ED observation to home in stable condition.    FINAL IMPRESSION  (T50.901A) Accidental drug overdose, initial encounter  (T40.601A) Opiate overdose, accidental or unintentional, initial encounter (HCC)      Electronically signed by: Juana Klein D.O., 7/31/2022 12:17 PM      This dictation was created using voice recognition software. The accuracy of the dictation is limited to the abilities of the software. I expect there may be some errors of grammar and possibly content. The nursing notes were reviewed and certain aspects of this information were incorporated into this note.

## 2022-07-31 NOTE — ED PROVIDER NOTES
ED Provider Note    CHIEF COMPLAINT  Chief Complaint   Patient presents with   • Drug Overdose     Regularly takes meth, received this dose from a different dealer.  Found non responsive with decreased respirations, responded to Narcan in the field.       HPI  Dio Ramirez is a 52 y.o. male who presents to the emergency department following drug overdose.  He snorts methamphetamine and states that he received his drugs this evening from a different dealer.  Per EMS, he was found unresponsive and received Narcan in the field With improvement of mental status.  On arrival here, he is alert and answering questions.  He reports feeling nauseous however denies headache, chest pain, shortness of breath.  Denies any recent illness.  Denies any other drugs or alcohol use tonight.  No history of suicidal ideation or intentional drug ingestion.    REVIEW OF SYSTEMS  See HPI for further details.   Review of Systems   Constitutional: Negative for chills and fever.   HENT: Negative for sore throat.    Eyes: Negative for blurred vision and redness.   Respiratory: Negative for cough and shortness of breath.    Cardiovascular: Negative for chest pain and leg swelling.   Gastrointestinal: Positive for nausea. Negative for abdominal pain and vomiting.   Genitourinary: Negative for dysuria and urgency.   Musculoskeletal: Negative for back pain and neck pain.   Skin: Negative for rash.   Neurological: Positive for loss of consciousness. Negative for focal weakness, seizures and headaches.   Psychiatric/Behavioral: Positive for substance abuse. Negative for suicidal ideas.         PAST MEDICAL HISTORY   has a past medical history of Bipolar 2 disorder (HCC) (2/23/2012) and Psoriasis.    SOCIAL HISTORY  Social History     Tobacco Use   • Smoking status: Former Smoker     Types: Cigarettes   • Smokeless tobacco: Never Used   Vaping Use   • Vaping Use: Never used   Substance and Sexual Activity   • Alcohol use: No   • Drug use: Yes      "Comment: meth 7/30, unknown narcotic 7/30   • Sexual activity: Yes     Partners: Female       SURGICAL HISTORY  patient denies any surgical history    CURRENT MEDICATIONS  Home Medications     Reviewed by Tiffani Manjarrez R.N. (Registered Nurse) on 07/30/22 at 2323  Med List Status: Complete   Medication Last Dose Status   alprazolam (XANAX) 0.5 MG TABS  Active   busPIRone (BUSPAR) 5 MG TABS  Active   risperiDONE (RISPERDAL) 3 MG Tab Not Taking Active   tramadol (ULTRAM) 50 MG TABS  Active   zolpidem (AMBIEN) 10 MG TABS  Active                ALLERGIES  No Known Allergies    PHYSICAL EXAM   VITAL SIGNS: /74   Pulse 64   Temp 36.2 °C (97.1 °F) (Temporal)   Resp 18   Ht 1.727 m (5' 8\")   Wt 59 kg (130 lb)   SpO2 92%   BMI 19.77 kg/m²      Physical Exam  Constitutional:       General: He is not in acute distress.     Comments:  Nontoxic middle-age male, diaphoretic   HENT:      Head: Normocephalic and atraumatic.   Eyes:      Conjunctiva/sclera: Conjunctivae normal.      Pupils: Pupils are equal, round, and reactive to light.   Cardiovascular:      Rate and Rhythm: Normal rate and regular rhythm.      Heart sounds: Normal heart sounds.   Pulmonary:      Effort: Pulmonary effort is normal. No respiratory distress.      Breath sounds: Normal breath sounds.   Abdominal:      General: There is no distension.      Palpations: Abdomen is soft.      Tenderness: There is no abdominal tenderness.   Musculoskeletal:         General: No tenderness. Normal range of motion.      Cervical back: Normal range of motion and neck supple.   Skin:     General: Skin is warm and dry.   Neurological:      Mental Status: He is alert and oriented to person, place, and time.      Comments: Moving all extremities spontaneously   Psychiatric:         Mood and Affect: Affect normal.           DIAGNOSTIC STUDIES    EKG  Results for orders placed or performed during the hospital encounter of 07/30/22   EKG (NOW)   Result Value Ref Range "    Report       [facility]    Test Date:  2022  Pt Name:    LUIS MANUEL ORTEGA              Department: ER  MRN:        [id number]                      Room:       [facility]  Gender:     Male                         Technician: [id number]  :        1970                   Requested By:BERTHA COX  Order #:    [id number]                    Reading MD: Bertha Cox MD    Measurements  Intervals                                Axis  Rate:       68                           P:          72  NC:         176                          QRS:        34  QRSD:       102                          T:          49  QT:         416  QTc:        443    Interpretive Statements  SINUS RHYTHM  RSR' IN V1 OR V2, RIGHT VCD OR RVH  Normal intervals, no ectopy  No significant ST or T wave change  Compared to ECG 2022 01:06:55  No significant change from prior  Electronically Signed On 2022 0:42:20 PDT by Bertha Cox MD             LABS  Personally reviewed by me  Labs Reviewed   CBC WITH DIFFERENTIAL - Abnormal; Notable for the following components:       Result Value    WBC 16.8 (*)     RBC 4.37 (*)     Hemoglobin 12.5 (*)     Hematocrit 37.5 (*)     MCHC 33.3 (*)     MPV 8.5 (*)     Neutrophils-Polys 83.80 (*)     Lymphocytes 8.70 (*)     Neutrophils (Absolute) 14.04 (*)     All other components within normal limits   COMP METABOLIC PANEL - Abnormal; Notable for the following components:    Potassium 3.5 (*)     Glucose 221 (*)     All other components within normal limits   ESTIMATED GFR           RADIOLOGY  Personally reviewed by me  DX-CHEST-PORTABLE (1 VIEW)   Final Result         1. No acute cardiopulmonary abnormalities are identified.          ED COURSE  Vitals:    22 0331 22 0401 22 0431 22 0504   BP: 128/81 110/76 116/77 111/74   Pulse: 66 62 63 64   Resp:  20 17 18   Temp:    36.2 °C (97.1 °F)   TempSrc:    Temporal   SpO2: 92% 92% 96% 92%   Weight:        Height:             Medications administered:  Medications   ondansetron (ZOFRAN) syringe/vial injection 4 mg (4 mg Intravenous Given 7/31/22 0133)   NS infusion 1,000 mL (0 mL Intravenous Stopped 7/31/22 0400)   ondansetron (ZOFRAN) syringe/vial injection 4 mg (4 mg Intravenous Given 7/31/22 0253)         Old records personally reviewed:  Patient was seen here on May 21 for very similar presentation requiring Narcan.        MEDICAL DECISION MAKING  Patient with history of methamphetamine abuse who presents for drug overdose.  He was found unresponsive in the field and responded to Narcan by EMS.  On arrival he is alert and oriented with normal vital signs.  There is no hypoxia or respiratory distress.  The patient did not do this intentionally and does not have any suicidal or homicidal ideation.  He is sad however from recent death in his family.  Labs show leukocytosis which is nonspecific.  He has not had any infectious symptoms.  His chest x-ray does not show pneumonia or pulmonary infiltrates.  No evidence of significant acidosis or electrolyte abnormality.  Patient will be monitored here in the department for 4 hours following Narcan administration and likely discharge home.    Upon reassessment, patient is resting comfortably with normal vital signs.  No new complaints at this time.  Discussed results with patient and/or family as well as importance of primary care follow up.  Peer recovery was at bedside and providing him with resources and an application for skilled nursing house.  He was counseled on drug use especially since this is his second opiate overdose in the last few months.  Patient understands plan of care and strict return precautions for new or changing symptoms.     Mr. Ramirez was here with a confirmed overdose of T40.2 - Other opioids; he has other known overdoses: T40.2 - Other opioids.            IMPRESSION  (T50.901A) Accidental drug overdose, initial encounter    Disposition: Discharge home,  stable condition  Results, diagnoses, and treatment options were discussed with the patient and/or family. Patient verbalized understanding of plan of care.    Patient referred to primary care provider for monitoring and treatment of blood pressure.      Discharge Medication List as of 7/31/2022  5:26 AM              Electronically signed by: Bertha Wang M.D., 7/31/2022 12:42 AM

## 2022-07-31 NOTE — DISCHARGE INSTRUCTIONS
Follow-up with primary care for reevaluation, substance abuse counseling or detox as indicated.    Do not use illicit drugs.  Naloxone as prescribed for overdose.    Return to the emergency department for altered mental status, seizure, difficulty breathing or other new concerns.

## 2022-07-31 NOTE — ED TRIAGE NOTES
"Chief Complaint   Patient presents with   • Drug Overdose     X 1 hour. PT found slumped over slot machine at [facility] by staff, unresponsive and apneic upon EMS arrival. PT initially had SPO2 of 10% on RA, EMS administered 2mg IM Narcan at 0828, SPO2 back to 92% on RA. PT Aox4 at this time, expressing no complaints. PT reports he snorted 1 line of meth prior to LOC. PT reports he was discharged yesterday for OD as well. CBS was 287 per EMS.   BP (!) 133/93   Pulse 76   Resp (!) 22   Ht 1.727 m (5' 8\")   Wt 59 kg (130 lb)   SpO2 96%   BMI 19.77 kg/m²     PT changed into gown, attached to monitor, resting comfortably at this time.   "

## 2022-07-31 NOTE — ED NOTES
Naloxone 4 mg/0.1 mL nasal spray (2 pack) was dispensed to the Patient for prevention of potential opioid related overdose per protocol.     Counseling was provided regarding:  - Information relating to the recognition, prevention and responses to opioid-related drug overdoses  - How to safely administer the naloxone nasal spray  - Potential side effects and adverse events related to the naloxone nasal spray  - The importance of seeking immediate emergency medical assistance for a person experiencing an opioid-related drug overdose, even after the administration of naloxone  - The immunity from certain civil or criminal liabilities for seeking medical assistance for a person experiencing an opioid-related overdose    Naloxone was given to the Patient.     Marvin SweeneyD

## 2022-07-31 NOTE — DISCHARGE INSTRUCTIONS
You were seen in the Emergency Department for opiate overdose.    Labs, chest xreay were completed without significant acute abnormalities.    Please use 1,000mg of tylenol or 600mg of ibuprofen every 6 hours as needed for pain.  Avoid further drug use.    Please follow up with your primary care physician.    Return to the Emergency Department with chest pain, trouble breathing, fainting, or other concerns.

## 2022-07-31 NOTE — ED TRIAGE NOTES
"Chief Complaint   Patient presents with   • Drug Overdose     Regularly takes meth, received this dose from a different dealer.  Found non responsive with decreased respirations, responded to Narcan in the field.       BIB EMS to [facility], pt on monitor and in gown.  Pt consists of: Found on the field unresponsive, EMS bagged on arrival, gave 2mg IN Narcan and 1mg IV Narcan in the field, placed NPA in field for airway protection.  Pt responded to IV dose, now GCS 15, participating in triage, not requiring supplemental oxygen at this time.       /84   Pulse 80   Temp 36 °C (96.8 °F) (Temporal)   Resp 14   Ht 1.727 m (5' 8\")   Wt 59 kg (130 lb)   SpO2 94%   BMI 19.77 kg/m²     "

## 2022-09-26 ENCOUNTER — HOSPITAL ENCOUNTER (EMERGENCY)
Facility: MEDICAL CENTER | Age: 52
End: 2022-09-26
Attending: EMERGENCY MEDICINE
Payer: COMMERCIAL

## 2022-09-26 VITALS
BODY MASS INDEX: 18.88 KG/M2 | HEART RATE: 80 BPM | SYSTOLIC BLOOD PRESSURE: 107 MMHG | RESPIRATION RATE: 16 BRPM | HEIGHT: 68 IN | OXYGEN SATURATION: 99 % | DIASTOLIC BLOOD PRESSURE: 63 MMHG | TEMPERATURE: 97.4 F | WEIGHT: 124.56 LBS

## 2022-09-26 DIAGNOSIS — A53.9 SYPHILIS (ACQUIRED): ICD-10-CM

## 2022-09-26 LAB
HIV 1+2 AB+HIV1 P24 AG SERPL QL IA: NORMAL
T PALLIDUM AB SER QL IA: NORMAL

## 2022-09-26 PROCEDURE — 86780 TREPONEMA PALLIDUM: CPT

## 2022-09-26 PROCEDURE — 99283 EMERGENCY DEPT VISIT LOW MDM: CPT

## 2022-09-26 PROCEDURE — 87389 HIV-1 AG W/HIV-1&-2 AB AG IA: CPT

## 2022-09-26 PROCEDURE — 36415 COLL VENOUS BLD VENIPUNCTURE: CPT

## 2022-09-26 ASSESSMENT — FIBROSIS 4 INDEX: FIB4 SCORE: 1.18

## 2022-09-26 ASSESSMENT — PAIN DESCRIPTION - PAIN TYPE: TYPE: ACUTE PAIN

## 2022-09-26 NOTE — ED TRIAGE NOTES
"Dio Ramirez  52 y.o. male  Chief Complaint   Patient presents with    Other     Pt states, \"I was giving plasma and they told me I have syphilis.\"        Pt amb to triage with steady gait for above complaint.   Pt is alert and oriented, speaking in full sentences, follows commands and responds appropriately to questions. Not in any apparent distress. Respirations are even and unlabored.  Pt placed in lobby. Pt educated on triage process. Pt encouraged to alert staff for any changes.  This RN in appropriate PPE during encounter.  Pt placed in mask as well    "

## 2022-09-26 NOTE — ED PROVIDER NOTES
"ED Provider Note    CHIEF COMPLAINT  Chief Complaint   Patient presents with    Other     Pt states, \"I was giving plasma and they told me I have syphilis.\"        HPI  Dio Ramirez is a 52 y.o. male who presents after getting a positive syphilis test while giving plasma.  He states that he has not had any rashes and has not felt bad.  He states he donates plasma every 6 months and this is the first time he is ever had a positive syphilis test.  He states he does not know how he got syphilis because his wife has been dead for 3 years and he is not sexually active and he does not use IV drugs.  He has no other complaints at this time.      REVIEW OF SYSTEMS    HEENT:  No ear pain, congestion or sore throat   EYES: no discharge redness or vision changes  CARDIAC: no chest pain, palpitations    PULMONARY: no dyspnea, cough or congestion   GI: no vomiting diarrhea or abdominal pain   : no dysuria, back pain or hematuria   Neuro: no weakness, numbness aphasia or headache  Musculoskeletal: no swelling deformity or pain no joint swelling  Endocrine: no fevers, sweating, weight loss   SKIN: no rash, erythema or contusions     See history of present illness all other systems are negative        PAST MEDICAL HISTORY  Past Medical History:   Diagnosis Date    Bipolar 2 disorder (HCC) 2/23/2012    Psoriasis        FAMILY HISTORY  Family History   Problem Relation Age of Onset    Other Mother         epilepsy    Alcohol/Drug Father        SOCIAL HISTORY  Social History     Socioeconomic History    Marital status: Single   Tobacco Use    Smoking status: Former     Types: Cigarettes    Smokeless tobacco: Never   Vaping Use    Vaping Use: Never used   Substance and Sexual Activity    Alcohol use: No    Drug use: Yes     Comment: meth 7/30, unknown narcotic 7/30    Sexual activity: Yes     Partners: Female       SURGICAL HISTORY  No past surgical history on file.    CURRENT MEDICATIONS  Home Medications    **Home " "medications have not yet been reviewed for this encounter**         ALLERGIES  No Known Allergies    PHYSICAL EXAM  VITAL SIGNS: /63   Pulse 80   Temp 36.3 °C (97.4 °F) (Temporal)   Resp 16   Ht 1.727 m (5' 8\")   Wt 56.5 kg (124 lb 9 oz)   SpO2 99%   BMI 18.94 kg/m²    Constitutional: Well developed, Well nourished, No acute respiratory distress, Non-toxic appearance.   HENT: Normocephalic, Atraumatic, Bilateral external ears normal, Oropharynx clear, mucous membranes are moist.  Eyes: Conjunctiva normal, No discharge. No icterus.  Neck: Normal range of motion. Supple.  Lymphatic: No cervical lymphadenopathy noted.   Cardiovascular: Normal heart rate, Normal rhythm, No murmurs, No rubs, No gallops.   Thorax & Lungs: Clear to auscultation bilaterally, No respiratory distress, No wheezing.  Abdomen: Soft nontender normal bowel sounds no rebound masses or peritoneal signs  Skin: Warm, Dry, No erythema, No rash.   Extremities: Intact distal pulses, No edema, No tenderness  Musculoskeletal: Good range of motion in all major joints. Normal gait.  Neurologic: Alert & oriented x 3. No focal deficits noted.          COURSE & MEDICAL DECISION MAKING  Pertinent Labs & Imaging studies reviewed. (See chart for details)  Differential diagnosis: Syphilis possible other STIs        I explained to the patient that I cannot tell him how he tested positive for syphilis but the treatment was 1 shot dose of Bicillin LA.  The patient is agreeable to this.  He will receive the injection and then be discharged home.  [facility] to see the rest of his results of the STD screening done here at [facility].    12:28 PM the patient was uncomfortable with getting 2 shots which was the required number in order to treat his syphilis with Bicillin.  Before I could talk to him and offer him Ativan or numbing medicine he ran out of the emergency department without any treatment at " all.        DISPOSITION:  AMA    FOLLOW UP:  [facility], Emergency Dept  [address] [id number] [phone]    As needed, If symptoms worsen    OUTPATIENT MEDICATIONS:  New Prescriptions    No medications on file         FINAL IMPRESSION  1. Syphilis (acquired)    untreated         Electronically signed by: Neena Moseley M.D., 9/26/2022 11:59 AM

## 2022-09-26 NOTE — ED NOTES
This RN went to bedside to administer penicillin shot to pt. Pt educated that it involved two sets of injections. Pt stated he was too anxious and that he hate shots.  Pt stated that he wanted to refuse the shots and leave. Pt instructed to wait to speak with the doctor and pharmacist.  Dr. Moseley brought to bedside to offer pt anxiety medication and numbing cream for shot administration but pt left before further education could be provided and before AMA paperwork could be signed.

## 2022-09-26 NOTE — ED NOTES
Pt ambulatory to room.  Pt is alert and oriented with a GCS of 15.  Pt comes in with a paper from [facility] with a Syphilis (Non-treponemal) Reactive box signed.  Pt states this is routine testing he does every 3 months for plasma donation and this one came back positive. Pt denies any sexual activity and IV drug use.

## 2022-09-26 NOTE — ED NOTES
Pt stated he became anxious over getting shots and was walking towards the exit.  Pt was advised on importance of staying and educated on IM injections.  Pt continued to state he was anxious and can't do the shots.  Further education was attempted without success.  Pt ambulated out with a GCS of 15 and alert and oriented.  Pt refused to sign AMA paperwork.  ERP advised on situation.

## 2022-11-26 ENCOUNTER — HOSPITAL ENCOUNTER (EMERGENCY)
Facility: MEDICAL CENTER | Age: 52
End: 2022-11-26
Attending: STUDENT IN AN ORGANIZED HEALTH CARE EDUCATION/TRAINING PROGRAM
Payer: COMMERCIAL

## 2022-11-26 VITALS
TEMPERATURE: 97.8 F | DIASTOLIC BLOOD PRESSURE: 76 MMHG | SYSTOLIC BLOOD PRESSURE: 119 MMHG | HEIGHT: 68 IN | BODY MASS INDEX: 19.35 KG/M2 | OXYGEN SATURATION: 95 % | HEART RATE: 82 BPM | WEIGHT: 127.65 LBS | RESPIRATION RATE: 15 BRPM

## 2022-11-26 DIAGNOSIS — R51.9 ACUTE NONINTRACTABLE HEADACHE, UNSPECIFIED HEADACHE TYPE: ICD-10-CM

## 2022-11-26 DIAGNOSIS — R11.0 NAUSEA: ICD-10-CM

## 2022-11-26 PROCEDURE — 700102 HCHG RX REV CODE 250 W/ 637 OVERRIDE(OP): Performed by: STUDENT IN AN ORGANIZED HEALTH CARE EDUCATION/TRAINING PROGRAM

## 2022-11-26 PROCEDURE — A9270 NON-COVERED ITEM OR SERVICE: HCPCS | Performed by: STUDENT IN AN ORGANIZED HEALTH CARE EDUCATION/TRAINING PROGRAM

## 2022-11-26 PROCEDURE — 99283 EMERGENCY DEPT VISIT LOW MDM: CPT

## 2022-11-26 PROCEDURE — 700111 HCHG RX REV CODE 636 W/ 250 OVERRIDE (IP): Performed by: STUDENT IN AN ORGANIZED HEALTH CARE EDUCATION/TRAINING PROGRAM

## 2022-11-26 RX ORDER — ACETAMINOPHEN 325 MG/1
650 TABLET ORAL ONCE
Status: COMPLETED | OUTPATIENT
Start: 2022-11-26 | End: 2022-11-26

## 2022-11-26 RX ORDER — ONDANSETRON 4 MG/1
4 TABLET, ORALLY DISINTEGRATING ORAL EVERY 6 HOURS PRN
Qty: 10 TABLET | Refills: 0 | Status: SHIPPED | OUTPATIENT
Start: 2022-11-26 | End: 2022-12-11

## 2022-11-26 RX ORDER — ACETAMINOPHEN 500 MG
500 TABLET ORAL EVERY 6 HOURS PRN
Qty: 30 TABLET | Refills: 0 | Status: SHIPPED | OUTPATIENT
Start: 2022-11-26 | End: 2022-12-11

## 2022-11-26 RX ORDER — ONDANSETRON 4 MG/1
4 TABLET, ORALLY DISINTEGRATING ORAL ONCE
Status: COMPLETED | OUTPATIENT
Start: 2022-11-26 | End: 2022-11-26

## 2022-11-26 RX ADMIN — ONDANSETRON 4 MG: 4 TABLET, ORALLY DISINTEGRATING ORAL at 20:01

## 2022-11-26 RX ADMIN — ACETAMINOPHEN 650 MG: 325 TABLET, FILM COATED ORAL at 20:01

## 2022-11-26 ASSESSMENT — FIBROSIS 4 INDEX: FIB4 SCORE: 1.18

## 2022-11-27 NOTE — DISCHARGE INSTRUCTIONS
Take the following medications for pain/fever at home:  Acetaminophen (Tylenol): Take 650 mg (2 regular strength) every 6 hours. Do not take more than 3,000mg in a 24 hour period.   Ibuprofen: Take 400-600 mg (2-3 regular strength) every 6 hours. Take with food.   Alternate the two medications and you can take one of them every 3 hours.       As we discussed you are likely coming down with a viral illness causing your symptoms.  You may even develop a fever over the next couple of days.  Treat your headache and fever with the medications above as needed.  We are also giving you medication for nausea.    Return to the emergency department if you develop difficulty breathing, focal weakness, slurred speech, vision changes, or other concerns.

## 2022-11-27 NOTE — ED PROVIDER NOTES
ED Provider Note    CHIEF COMPLAINT  Chief Complaint   Patient presents with    Headache     Pt has headache that started today. Pt also c/o nausea and diarrhea. Denies vomiting.       HPI  Dio Ramirez is a 52 y.o. male who presents with headache and nausea that started today.  Patient reports he was up today moving around as usual, out in the sun and began to develop a slow headache.  Headache was not sudden in onset.  He denies any numbness, weakness, tingling.  No vision changes.  States the light made it slightly worse and he blames this on her glasses mainly for his headache.  he also started developing mild nausea but no vomiting.  he reports some loose stool earlier today.  denies any abdominal pain neck pain, focal weakness.  Denies any falls or head trauma.  He has not taken anything for his symptoms.  He states he is otherwise healthy however history of bipolar is noted in the chart.  Per review of the chart there was question of possible syphilis after giving blood several months ago however repeat testing was negative here.     REVIEW OF SYSTEMS  See HPI for further details. All other systems are negative.     PAST MEDICAL HISTORY   has a past medical history of Bipolar 2 disorder (HCC) (2/23/2012) and Psoriasis.    SOCIAL HISTORY  Social History     Tobacco Use    Smoking status: Former     Types: Cigarettes    Smokeless tobacco: Never   Vaping Use    Vaping Use: Never used   Substance and Sexual Activity    Alcohol use: No    Drug use: Yes     Comment: meth 7/30, unknown narcotic 7/30    Sexual activity: Yes     Partners: Female       SURGICAL HISTORY  patient denies any surgical history    CURRENT MEDICATIONS  Home Medications       Reviewed by Juan Antonio Sauceda R.N. (Registered Nurse) on 11/26/22 at 1902  Med List Status: Partial     Medication Last Dose Status   alprazolam (XANAX) 0.5 MG TABS  Active   busPIRone (BUSPAR) 5 MG TABS  Active   Naloxone (NARCAN) 4 MG/0.1ML Liquid  Active  "  risperiDONE (RISPERDAL) 3 MG Tab  Active   tramadol (ULTRAM) 50 MG TABS  Active   zolpidem (AMBIEN) 10 MG TABS  Active                    ALLERGIES  No Known Allergies    PHYSICAL EXAM  VITAL SIGNS: /76   Pulse 82   Temp 36.6 °C (97.8 °F) (Temporal)   Resp 15   Ht 1.727 m (5' 8\")   Wt 57.9 kg (127 lb 10.3 oz)   SpO2 95%   BMI 19.41 kg/m²     Pulse ox interpretation: I interpret this pulse ox as normal.  Constitutional: Alert in no apparent distress.  HENT: No signs of trauma, Bilateral external ears normal, Nose normal.  Edentulous  Eyes: Pupils are equal and reactive, Conjunctiva normal, Non-icteric.   Neck: Normal range of motion, No tenderness, Supple, No stridor.   Cardiovascular: Regular rate and rhythm, no murmurs.   Thorax & Lungs: Normal breath sounds, No respiratory distress, No wheezing, No chest tenderness.   Abdomen: Soft, No tenderness, No masses, No pulsatile masses. No peritoneal signs.  Skin: Warm, Dry, No erythema, No rash.   Extremities: Intact distal pulses, No edema, No tenderness, No cyanosis.  Musculoskeletal: Good range of motion in all major joints. No major deformities noted.   Neurologic: 5/5 bilateral upper and lower extremity strength, Grossly intact sensation symmetrically, CN II-XII intact, Normal, fluent speech, GCS 15  Psychiatric: Affect normal, Judgment normal, Mood normal.       COURSE & MEDICAL DECISION MAKING  Pertinent Labs & Imaging studies reviewed. (See chart for details)      52-year-old male presented with gradual onset headache and nausea that started today with no other symptoms.  He had a nonfocal neurologic exam.  No red flags for head traum, subarachnoid hemorrhage, or stroke.  No signs of meningismus.  His abdomen is soft and nontender do not suspect obstruction, appendicitis, or intra-abdominal process causing his nausea.  Prior questionable syphilis was likely false positive given his negative test here following that.  His symptoms may be viral given " the gradual onset or possibly migraine.  Patient was treated with Tylenol and Zofran for symptoms.  He was discharged home with return precautions.      The patient will not drink alcohol nor drive with prescribed medications. The patient will return for worsening symptoms and is stable at the time of discharge. The patient verbalizes understanding and will comply.    FINAL IMPRESSION  1. Acute nonintractable headache, unspecified headache type  acetaminophen (TYLENOL) 500 MG Tab      2. Nausea  ondansetron (ZOFRAN ODT) 4 MG TABLET DISPERSIBLE            Electronically signed by: Aleena Vera M.D., 11/26/2022 8:00 PM

## 2022-11-27 NOTE — ED NOTES
Pt ambulated to [facility] from [facility] with steady gait.  On monitor, call light in reach. Chart up for ERP.

## 2022-11-27 NOTE — ED NOTES
"Pt discharged to lobby with steady gait and AOx4. Pt in possession of belongings. Pt provided discharge education and information pertaining to medications and follow up appointments. Pt received copy of discharge instructions and verbalized understanding. /76   Pulse 82   Temp 36.6 °C (97.8 °F) (Temporal)   Resp 15   Ht 1.727 m (5' 8\")   Wt 57.9 kg (127 lb 10.3 oz)   SpO2 95%   BMI 19.41 kg/m²   "

## 2022-11-27 NOTE — ED TRIAGE NOTES
"Chief Complaint   Patient presents with    Headache     Pt has headache that started today. Pt also c/o nausea and diarrhea. Denies vomiting.     /75   Pulse 76   Temp 36.4 °C (97.6 °F) (Temporal)   Resp 16   Ht 1.727 m (5' 8\")   Wt 57.9 kg (127 lb 10.3 oz)   SpO2 100%   BMI 19.41 kg/m²     "

## 2022-12-10 ENCOUNTER — HOSPITAL ENCOUNTER (EMERGENCY)
Facility: MEDICAL CENTER | Age: 52
End: 2022-12-11
Attending: EMERGENCY MEDICINE
Payer: COMMERCIAL

## 2022-12-10 DIAGNOSIS — R45.851 SUICIDAL IDEATION: ICD-10-CM

## 2022-12-10 LAB — POC BREATHALIZER: 0 PERCENT (ref 0–0.01)

## 2022-12-10 PROCEDURE — 99285 EMERGENCY DEPT VISIT HI MDM: CPT

## 2022-12-10 PROCEDURE — 302970 POC BREATHALIZER: Performed by: EMERGENCY MEDICINE

## 2022-12-10 PROCEDURE — 80307 DRUG TEST PRSMV CHEM ANLYZR: CPT

## 2022-12-10 ASSESSMENT — FIBROSIS 4 INDEX: FIB4 SCORE: 1.18

## 2022-12-11 VITALS
SYSTOLIC BLOOD PRESSURE: 110 MMHG | HEIGHT: 68 IN | BODY MASS INDEX: 19.71 KG/M2 | HEART RATE: 72 BPM | DIASTOLIC BLOOD PRESSURE: 68 MMHG | WEIGHT: 130.07 LBS | OXYGEN SATURATION: 98 % | TEMPERATURE: 98.1 F | RESPIRATION RATE: 16 BRPM

## 2022-12-11 LAB
AMPHET UR QL SCN: POSITIVE
BARBITURATES UR QL SCN: NEGATIVE
BENZODIAZ UR QL SCN: NEGATIVE
BZE UR QL SCN: NEGATIVE
CANNABINOIDS UR QL SCN: NEGATIVE
METHADONE UR QL SCN: NEGATIVE
OPIATES UR QL SCN: NEGATIVE
OXYCODONE UR QL SCN: NEGATIVE
PCP UR QL SCN: NEGATIVE
PROPOXYPH UR QL SCN: NEGATIVE

## 2022-12-11 PROCEDURE — 90791 PSYCH DIAGNOSTIC EVALUATION: CPT

## 2022-12-11 NOTE — ED PROVIDER NOTES
"ED Provider Note    CHIEF COMPLAINT  Chief Complaint   Patient presents with    Suicidal Ideation       HPI  Dio Ramirez is a 52 y.o. male who presents with suicidal ideation.  The patient states that he lost his wife around this time of year couple of years ago.  He states he has been struggling with depression.  He states he is in a really dark spot.  He is concerned that he is on his own he will jump in front of traffic to kill himself.  He does abuse methamphetamines and marijuana but states he has not had any alcohol recently.  He does not have any current medical complaints.    REVIEW OF SYSTEMS  See HPI for further details. All other systems are negative.     PAST MEDICAL HISTORY  Past Medical History:   Diagnosis Date    Bipolar 2 disorder (HCC) 2/23/2012    Psoriasis        FAMILY HISTORY  [unfilled]    SOCIAL HISTORY  Social History     Socioeconomic History    Marital status: Single   Tobacco Use    Smoking status: Former     Types: Cigarettes    Smokeless tobacco: Never   Vaping Use    Vaping Use: Never used   Substance and Sexual Activity    Alcohol use: No    Drug use: Yes     Types: Inhaled     Comment: Meth use regulary, some opioid use, marijuana    Sexual activity: Yes     Partners: Female       SURGICAL HISTORY  No past surgical history on file.    CURRENT MEDICATIONS  Home Medications    **Home medications have not yet been reviewed for this encounter**         ALLERGIES  No Known Allergies    PHYSICAL EXAM  VITAL SIGNS: /68   Pulse 84   Temp 36.1 °C (96.9 °F) (Temporal)   Resp 18   Ht 1.727 m (5' 8\")   Wt 59 kg (130 lb 1.1 oz)   SpO2 97% Comment: rOOM AIR  BMI 19.78 kg/m²       Constitutional: Depressed and tearful.   HENT: Normocephalic, Atraumatic, Bilateral external ears normal, Oropharynx moist, No oral exudates, Nose normal.   Eyes: PERRLA, EOMI, Conjunctiva normal, No discharge.   Neck: Normal range of motion, No tenderness, Supple, No stridor.   Lymphatic: No " lymphadenopathy noted.   Cardiovascular: Normal heart rate, Normal rhythm, No murmurs, No rubs, No gallops.   Thorax & Lungs: Normal breath sounds, No respiratory distress, No wheezing, No chest tenderness.   Abdomen: Bowel sounds normal, Soft, No tenderness, No masses, No pulsatile masses.   Skin: Warm, Dry, No erythema, No rash.   Back: No tenderness, No CVA tenderness.   Extremities: Intact distal pulses, No edema, No tenderness, No cyanosis, No clubbing.   Neurologic: Alert & oriented x 3, Normal motor function, Normal sensory function, No focal deficits noted.   Psychiatric: Depressed affect with ongoing suicidal ideation      COURSE & MEDICAL DECISION MAKING  Pertinent Labs & Imaging studies reviewed. (See chart for details)  This a 52-year-old gentleman who presents to the emergency department with suicidal ideation.  The patient's alcohol level was negative.  Drug screen is currently pending.  He was honest about his methamphetamine and marijuana use.  He does have a specific plan and cannot contract for safety.  Therefore the patient will be placed on a legal hold.  The patient will need evaluation by life skills for possible placement.  She does not have any current medical plaints.  We will admit the patient to the emergency department under observation status for placement and further treatment of his depression and suicidal ideation.    The patient will be admitted for ED observation on 12/10/2022 at 2200 for depression and suicidal ideation.    FINAL IMPRESSION  1.  Acute depression  2.  Suicidal ideation  3.  Polysubstance abuse         Electronically signed by: Fuentes Muhammad M.D., 12/10/2022 9:55 PM

## 2022-12-11 NOTE — ED TRIAGE NOTES
Patient to ED with complaints S.I. he has a plan to jump into traffic. He states he does not trust himself to not harm himself. He reports he is feeling particularly depressed because his wife  this time a few years ago. He is currently living in the shelter. He does use meth and marijuana regularly.

## 2022-12-11 NOTE — DISCHARGE PLANNING
[facility] ALERT TEAM DISCHARGE PLANNING NOTE    Date:  12/11/2022  Patient Name:  Dio Ramirez - 52 y.o. - Discharge Planning  MRN:  [id number]   YOB: 1970  ADMISSION DATE:  12/10/2022      Writer forwarded referral packet for inpatient psychiatric care to the following community providers: [facility] and [facility]   Items included in the referral packet:   _X___Face Sheet   _X____Pages 1 and 2 of completed legal hold   _X___Alert Team/Psych Assessment   _X____H&P   _X____UDS   _X____Blood Alcohol   _X____Vital signs   _NA__Pregnancy Test (if applicable)   _NA___Medications List   _NA____Covid Screen

## 2022-12-11 NOTE — ED NOTES
Pt ambulated with steady gait to the restroom and back to his room, sitter outside his room in direct line of sight to the pt, will continue to monitor

## 2022-12-11 NOTE — ED NOTES
Coby Sell  : 1985 Coby Sell  : 1985  Payor: Harvey Myers / Plan: [place]OSS OF [address] / Product Type: PPO /  [facility]  [address] [facility], [place] [address] [address] [facility]  [phone]   [phone]              OUTPATIENT PHYSICAL THERAPY: Daily Note 2018    ICD-10: Treatment Diagnosis: pain in right foot (M79.671)  Difficulty in walking, not elsewhere classified (R26.2)  Precautions/Allergies:   None per SueEasy   Fall Risk Score: 1 (? 5 = High Risk)  MD Orders: Evaluate and treat MEDICAL/REFERRING DIAGNOSIS:  Pain, foot, right, chronic [M79.671, G89.29]  DATE OF ONSET: date of surgery 17  REFERRING PHYSICIAN: Jonny Lazo MD; Haley Valencia MD  RETURN PHYSICIAN APPOINTMENT: none schedule at this time   Patient has attended 16 physical therapy session including initial evaluation. INITIAL ASSESSMENT:  Ms. Trista Jung presents with improving pain right foot and continued difficulty walking status post tarsal tunnel decompression on 17 with the [facility]. She had an ankle injury in 2017 that left her unable to bear weight through her right lower extremity. [facility] gave her a preliminary diagnosis of CRPS but then performed tarsal tunnel decompression right. She is cleared for weight bearing as tolerated status post procedure. She currently uses a knee scooter to get around. She holds her foot in dorsiflexed and inverted position - but positioning is decreasing at rest. She does not like to touch her foot to floor. She presents with improving range of motion right foot and ankle. She presents with decreased independence with functional mobility and activities of daily living. She would benefit from skilled physical therapy in order to restore prior level of function and prevent future impairment. PROBLEM LIST (Impacting functional limitations):  1. Decreased Strength  2.  Decreased ADL/Functional Pt resting in bed eating apple sauce, no signs of distress noted, sitter outside room in direct line of sight to the pt, will continue to monitor    Activities  3. Decreased Transfer Abilities  4. Decreased Ambulation Ability/Technique  5. Decreased Balance  6. Increased Pain  7. Decreased Activity Tolerance  8. Decreased Pacing Skills  9. Decreased Work Simplification/Energy Conservation Techniques  10. Decreased Flexibility/Joint Mobility  11. Edema/Girth  12. Decreased [place] with Home Exercise Program INTERVENTIONS PLANNED:  1. Balance Exercise  2. Cold  3. Family Education  4. Gait Training  5. Heat  6. Home Exercise Program (HEP)  7. Manual Therapy  8. Neuromuscular Re-education/Strengthening  9. Range of Motion (ROM)  10. Therapeutic Activites  11. Therapeutic Exercise/Strengthening  12. Transcutaneous Electrical Nerve Stimulation (TENS)  13. Transfer Training  14. Ultrasound (US)  15. orthotic management and training   TREATMENT PLAN:  Effective Dates: 1/2/18 TO 4/2/18. Frequency/Duration: 2 times a week for 8 weeks  GOALS: (Goals have been discussed and agreed upon with patient.)  Discharge Goals: Time Frame: 8 weeks  1. Patient will be independent with home exercise program within 8 weeks in order to improve independence with management of patient's symptoms and/or functional deficits. (CONTINUE 1/24/18)  2. Patient will improve their foot and ankle ability measure score to 20/84 within 8 weeks in order to improve pain free independence with functional mobility. (CONTINUE 1/24/18)  3. Patient will ambulate with equal weight bearing with use of least restrictive device within 8 weeks in order to improve pain free independence with functional mobility. (CONTINUE 1/24/18)  Rehabilitation Potential For Stated Goals: Good              The information in this section was collected on 1/2/18 (except where otherwise noted). HISTORY:   History of Present Injury/Illness (Reason for Referral):  Ms. Trista Jung presents with increased pain right foot and difficulty walking status post tarsal tunnel decompression on 12/7/17 with the [facility].  She had an ankle injury in April 2017 that left her unable to bear weight through her right lower extremity. [address] gave her a preliminary diagnosis of CRPS but then performed tarsal tunnel decompression right. She is cleared for weight bearing as tolerated status post procedure. She currently uses a knee scooter to get around. She holds her foot in dorsiflexed and inverted position. She does not like to touch her foot to floor. 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 Current Medications: vuvanse sonata, birth control, advil, tylenol    Date Last Reviewed:  2/14/2018   Number of Personal Factors/Comorbidities that affect the Plan of Care: 1-2: MODERATE COMPLEXITY   EXAMINATION:   Observation/Orthostatic Postural Assessment:  Assessed 1/24/18 Cece Streeter sits with forward head and rounded shoulders which indicate tight anterior chest musculature, upper trapezius, and levator scapula and weak posterior scapula musculature and deep cervical flexors. She holds her foot in dorsiflexed and inverted position - decreased guarded positioning.  She does not touch foot to floor when sitting  Palpation:  Assessed 1/2/18         Cece Streeter notes tenderness with palpation right plantar fascia, decreased myofascial mobility right foot/ankle and surrounding incision, increased edema right ankle/foot  ROM:  Assessed 1/24/18  Left ankle dorsiflexion active range of motion with knee flexed: 10 degrees  Left ankle dorsiflexion active range of motion with knee extended: 10 degrees  Left ankle dorsiflexion passive range of motion with knee extended: 20 degrees  Left ankle plantarflexion active range of motion: 35 degrees    Right ankle dorsiflexion active range of motion with knee flexed: 10 degrees  Rightt ankle dorsiflexion active range of motion with knee extended: 10 degrees  Right ankle dorsiflexion passive range of motion with knee extended: 15 degrees  Rightt ankle plantarflexion active range of motion: 30 degrees  At least 30 degrees of ankle inversion/eversion passive range of motion    Right first ray dorsiflexed with hypomobility plantarflexion   Strength:  Assessed 1/24/18         At least 3/5 strength right ankle - will continue to assess as appropriate  4+/5 left ankle and lower extremity all major muscle groups  Special Tests:    Neurological Screen: Assessed 1/2/18        Myotomes:  Strong left lower extremity, weak right ankle/foot due to recent procedure        Dermatomes: Altered sensation right foot/ankle        Reflexes:    Functional Mobility: Assessed 1/24/18 Ranjit Stabren is modified independent with functional mobility and activities of daily living with use of scooter, she notes she has tried to shift weight to right lower extremity with use of rolling walker but has difficulty - still not attempting to ambulate with assistive device   Balance:  Assessed 1/2/17         Poor standing dynamic balance - fair standing dynamic balance with use of scooter  Mental Status:  Assessed 1/2/17        Alert and oriented x 4   Body Structures Involved:  1. Nerves  2. Bones  3. Joints  4. Muscles  5. Ligaments Body Functions Affected:  1. Sensory/Pain  2. Neuromusculoskeletal  3. Movement Related  4. Skin Related Activities and Participation Affected:  1. General Tasks and Demands  2. Mobility  3. Self Care  4. Domestic Life  5. Interpersonal Interactions and Relationships  6.  Community, Social and [place]   Number of elements that affect the Plan of Care: 3: MODERATE COMPLEXITY   CLINICAL PRESENTATION:   Presentation: Evolving clinical presentation with changing clinical characteristics: MODERATE COMPLEXITY   CLINICAL DECISION MAKING:   Outcome Measure: Assessed 1/24/18  Tool Used: PT/OT FOOT AND ANKLE ABILITY MEASURE  Score:  Initial: 1/84 3/84   Interpretation of Score: For the \"Activities of Daily Living\", there are 21 questions each scored on a 5 point scale with 0 representing \"Unable to do\" and 4 representing \"No difficulty\". The lower the score, the greater the functional disability. 84/84 represents no disability. Minimal detectable change is 5.7 points. With the addition of the 8 questions in the \"Sports Subscale,\" there are 29 questions, each scored on a 5 point scale with 0 representing \"Unable to do\" and 4 representing \"No difficulty\". The lower the score, the greater the functional disability. 116/116 represents no disability. Minimal detectable change is 12.3 points. Activities of Daily Living:  Score 84 83-68 67-51 50-34 33-18 17-1 0   Modifier CH CI CJ CK CL CM CN     Medical Necessity:   · Patient is expected to demonstrate progress in strength, range of motion, balance, coordination and functional technique to increase independence with pain free functional mobility. · Patient demonstrates good rehab potential due to higher previous functional level. Reason for Services/Other Comments:  · Patient continues to require skilled intervention due to decreased independence with functional mobility.    Use of outcome tool(s) and clinical judgement create a POC that gives a: Questionable prediction of patient's progress: MODERATE COMPLEXITY            TREATMENT:   (In addition to Assessment/Re-Assessment sessions the following treatments were rendered)  Pre-treatment Symptoms/Complaints:  Marella Lefort notes 5/10 pain right foot an ankle  Pain: Initial:   Pain Intensity 1: 5  Pain Location 1: Foot  Pain Orientation 1: Right  Pain Intervention(s) 1: Exercise, Therapeutic touch      (In addition to Assessment/Re-Assessment sessions the following treatments were rendered)  Therapeutic Exercise: (14 Minutes):  Exercises per grid below to improve mobility, strength and coordination. Required minimal visual and verbal cues to promote proper body alignment and promote proper body posture. Progressed range and complexity of movement as indicated. 1. Gentle active range of motion right for improved mobility and strength - ABC, dorsiflexion/plantarflexion, inversion/eversion, circles Not performed this date     2. Gentle passive dorsiflexion stretching with towel with foot inverted to reduce tibial nerve tension 3 sets 30 seconds right    3. Tibial nerve gliding technique with foot plantarflexed and inverted and short arc quads 20 repetitions right Not performed this date     4. Gentle weight bearing dorsiflexion stretch in sitting with incline board and foot inverted 3 sets 30 seconds right Not performed this date     5. NuStep for close chain dynamic mobility right ankle/foot level 3 8 minutes - walking boot donned    6. Ankle strengthening with red theraband resistance - dorsiflexion, plantarflexion, eversion, inversion 20 repetitions Not performed this date     7. Standing in parallel bars with attempting to put weight through right lower extremity 2 minutes - on force plate with balance master this date and use of rolling walker and walking boot donned 25% weightbearing right lower extremity     8. Ambulation with rolling walker with cues for equal step lengths 2 sets 100 feet - dons walking boot right lower extremity     9.  Sitting on jada disc with right foot on floor and left LAQ and marching and hamstring curls with red theraband resistance (bilaterally) for weightbearing through right lower extremity and dynamic stability right ankle Not performed this date        Manual Therapy (    Soft Tissue Mobilization Duration  Duration: 24 Minutes): Myofascial release to right ankle/foot for improved mobility, muscle relaxation and decreased pain   Cross friction massage to right ankle incision to prevent adhesions   First ray plantarflexion/dorsiflexion mobilization for improved mobility   Plantar fascia cross friction massage for improved mobility   kinesiotape to plantar fascia and achilles tendon for improved mobility and decreased pain   leukotape over proximal plantar fascia at heel  For decreased tension, improved mobility and decreased pain     Therapeutic Modalities:                                                                                                 Treatment/Session Assessment:    Response to Treatment:  Verna Haynes notes 5/10 pain following therapy session. Able to demonstrate 25% weightbearing through right lower extremity with use of rolling walker and walking boot, significant improvement in plantar fascia mobility since yesterday's session   Pain: Post Treatment Session: 5/10  · Compliance with Program/Exercises: Will assess as treatment progresses. · Recommendations/Intent for next treatment session: \"Next visit will focus on advancements to more challenging activities and aquatic therapy for improved weight bearing right lower extremity\".   Total Treatment Duration:  PT Patient Time In/Time Out  Time In: 0800  Time Out: Jorge 81, PT

## 2022-12-11 NOTE — ED NOTES
Pt laying in bed, eyes closed, equal chest rise noted, no signs of distress noted, sitter outside of room in direct line of sight to the pt, will continue to monitor

## 2022-12-11 NOTE — ED NOTES
Pt resting in bed, eyes closed, equal chest rise noted, no signs of distress noted at this time, sitter outside room in direct line of sight to the pt, will continue to monitor

## 2022-12-11 NOTE — ED NOTES
Pt resting in bed, eyes closed, equal chest rise noted, no signs of distress noted at this time, sitter outside room in direct line of sight to the pt, will continue to monitor.

## 2022-12-11 NOTE — ED NOTES
Pt requested to meal tray, meal tray provided to pt, no signs of distress noted at this time, sitter outside room in direct line of sight to the pt, will continue to monitor

## 2022-12-11 NOTE — ED NOTES
Sitter outside room in direct line of sight to the pt, pt provided with food and drink.  No signs of distress noted. Will continue to monitor

## 2022-12-11 NOTE — DISCHARGE SUMMARY
"  ED Observation Discharge Summary    Patient:Dio Ramirez  Patient : 1970  Patient MRN: [id number]  Patient PCP: Pcp Pt States None    Admit Date: 12/10/2022  Discharge Date and Time: 22 8:37 AM  Discharge Diagnosis: Suicidal ideation  Discharge Attending: Antwan Marques M.D.  Discharge Service: ED Observation    ED Course  Dio is a 52 y.o. male who was evaluated at [facility] with suicidal ideation.  Seen by Dr. Muhammad.  Medically cleared.  No complaints today.  Denies any family history of medical issues.  Patient was excepted to [facility].  Transported in satisfactory condition    Discharge Exam:  /76   Pulse 76   Temp 36.7 °C (98.1 °F) (Temporal)   Resp 15   Ht 1.727 m (5' 8\")   Wt 59 kg (130 lb 1.1 oz)   SpO2 96%   BMI 19.78 kg/m² .    Constitutional: Awake and alert. Nontoxic  HENT:  Grossly normal  Eyes: Grossly normal      Labs  Results for orders placed or performed during the hospital encounter of 12/10/22   Urine Drug Screen   Result Value Ref Range    Amphetamines Urine Positive (A) Negative    Barbiturates Negative Negative    Benzodiazepines Negative Negative    Cocaine Metabolite Negative Negative    Methadone Negative Negative    Opiates Negative Negative    Oxycodone Negative Negative    Phencyclidine -Pcp Negative Negative    Propoxyphene Negative Negative    Cannabinoid Metab Negative Negative   POC BREATHALIZER   Result Value Ref Range    POC Breathalizer 0.000 0.00 - 0.01 Percent     Discharge Condition: Stable    Electronically signed by: Antwan Marques M.D., 2022 8:37 AM    "
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
## 2022-12-11 NOTE — ED NOTES
Urine collected and sent to lab, pt ambulated with steady gait to the restroom and back to his room

## 2022-12-11 NOTE — ED NOTES
erp at bedside, belongings taken from pt and placed in belongings bag, pt states his wife  of cancer between  and new years and he is thinking of jumping in front of an 18 peters to end it all.  Pt states he feels really depressed and feels like he does not have a purpose.

## 2022-12-11 NOTE — ED NOTES
Pt resting in bed, eyes closed, equal chest rise noted, no signs of distress at this time, sitter outside room in direct line of sight to the pt, will continue to monitor

## 2022-12-11 NOTE — DISCHARGE PLANNING
Alert Team/Behavioral Health   Note:        Mental Health Transfer     Referral: transfer to Mental Health Facility     Intervention: Alert Team RN (Rupesh SIEGEL) notified writer of patient acceptance @ [facility].     Patient's accepting provider is MD Jovan.     Transport arranged through [facility].     The patient will be picked up at 1000.     Notified Attending Provider (Shelli PATEL), Bedside RN (Ambrosio GA), and Alert Team RN (Mariia CARTY) via Voalte of the departure time as well as accepting facility.      Transfer packet to be created and placed in chart with filled out COBRA form.    Plan: transfer to [facility] via [facility] ambulance for inpatient acute psychiatric care.

## 2023-01-26 ENCOUNTER — APPOINTMENT (OUTPATIENT)
Dept: RADIOLOGY | Facility: MEDICAL CENTER | Age: 53
End: 2023-01-26
Attending: EMERGENCY MEDICINE
Payer: COMMERCIAL

## 2023-01-26 ENCOUNTER — HOSPITAL ENCOUNTER (EMERGENCY)
Facility: MEDICAL CENTER | Age: 53
End: 2023-01-26
Attending: EMERGENCY MEDICINE
Payer: COMMERCIAL

## 2023-01-26 VITALS
WEIGHT: 130.51 LBS | TEMPERATURE: 98.2 F | HEART RATE: 76 BPM | HEIGHT: 68 IN | SYSTOLIC BLOOD PRESSURE: 112 MMHG | DIASTOLIC BLOOD PRESSURE: 71 MMHG | BODY MASS INDEX: 19.78 KG/M2 | RESPIRATION RATE: 18 BRPM | OXYGEN SATURATION: 98 %

## 2023-01-26 DIAGNOSIS — T14.8XXA WOUND INFECTION: ICD-10-CM

## 2023-01-26 DIAGNOSIS — L08.9 WOUND INFECTION: ICD-10-CM

## 2023-01-26 DIAGNOSIS — L03.115 CELLULITIS OF RIGHT LOWER EXTREMITY: ICD-10-CM

## 2023-01-26 LAB
ALBUMIN SERPL BCP-MCNC: 3.5 G/DL (ref 3.2–4.9)
ALBUMIN/GLOB SERPL: 0.9 G/DL
ALP SERPL-CCNC: 93 U/L (ref 30–99)
ALT SERPL-CCNC: 10 U/L (ref 2–50)
ANION GAP SERPL CALC-SCNC: 11 MMOL/L (ref 7–16)
AST SERPL-CCNC: 17 U/L (ref 12–45)
BASOPHILS # BLD AUTO: 0.5 % (ref 0–1.8)
BASOPHILS # BLD: 0.07 K/UL (ref 0–0.12)
BILIRUB SERPL-MCNC: 0.4 MG/DL (ref 0.1–1.5)
BUN SERPL-MCNC: 6 MG/DL (ref 8–22)
CALCIUM ALBUM COR SERPL-MCNC: 9.4 MG/DL (ref 8.5–10.5)
CALCIUM SERPL-MCNC: 9 MG/DL (ref 8.5–10.5)
CHLORIDE SERPL-SCNC: 98 MMOL/L (ref 96–112)
CO2 SERPL-SCNC: 23 MMOL/L (ref 20–33)
CREAT SERPL-MCNC: 0.59 MG/DL (ref 0.5–1.4)
EOSINOPHIL # BLD AUTO: 0.54 K/UL (ref 0–0.51)
EOSINOPHIL NFR BLD: 4.2 % (ref 0–6.9)
ERYTHROCYTE [DISTWIDTH] IN BLOOD BY AUTOMATED COUNT: 39.3 FL (ref 35.9–50)
GFR SERPLBLD CREATININE-BSD FMLA CKD-EPI: 115 ML/MIN/1.73 M 2
GLOBULIN SER CALC-MCNC: 3.7 G/DL (ref 1.9–3.5)
GLUCOSE SERPL-MCNC: 112 MG/DL (ref 65–99)
HCT VFR BLD AUTO: 37.1 % (ref 42–52)
HGB BLD-MCNC: 12.3 G/DL (ref 14–18)
IMM GRANULOCYTES # BLD AUTO: 0.05 K/UL (ref 0–0.11)
IMM GRANULOCYTES NFR BLD AUTO: 0.4 % (ref 0–0.9)
LACTATE SERPL-SCNC: 1 MMOL/L (ref 0.5–2)
LACTATE SERPL-SCNC: 1.4 MMOL/L (ref 0.5–2)
LYMPHOCYTES # BLD AUTO: 1.2 K/UL (ref 1–4.8)
LYMPHOCYTES NFR BLD: 9.3 % (ref 22–41)
MCH RBC QN AUTO: 28.6 PG (ref 27–33)
MCHC RBC AUTO-ENTMCNC: 33.2 G/DL (ref 33.7–35.3)
MCV RBC AUTO: 86.3 FL (ref 81.4–97.8)
MONOCYTES # BLD AUTO: 1.02 K/UL (ref 0–0.85)
MONOCYTES NFR BLD AUTO: 7.9 % (ref 0–13.4)
NEUTROPHILS # BLD AUTO: 10.02 K/UL (ref 1.82–7.42)
NEUTROPHILS NFR BLD: 77.7 % (ref 44–72)
NRBC # BLD AUTO: 0 K/UL
NRBC BLD-RTO: 0 /100 WBC
PLATELET # BLD AUTO: 289 K/UL (ref 164–446)
PMV BLD AUTO: 8.9 FL (ref 9–12.9)
POTASSIUM SERPL-SCNC: 4.4 MMOL/L (ref 3.6–5.5)
PROT SERPL-MCNC: 7.2 G/DL (ref 6–8.2)
RBC # BLD AUTO: 4.3 M/UL (ref 4.7–6.1)
SODIUM SERPL-SCNC: 132 MMOL/L (ref 135–145)
WBC # BLD AUTO: 12.9 K/UL (ref 4.8–10.8)

## 2023-01-26 PROCEDURE — 93971 EXTREMITY STUDY: CPT | Mod: RT

## 2023-01-26 PROCEDURE — 85025 COMPLETE CBC W/AUTO DIFF WBC: CPT

## 2023-01-26 PROCEDURE — 80053 COMPREHEN METABOLIC PANEL: CPT

## 2023-01-26 PROCEDURE — 99284 EMERGENCY DEPT VISIT MOD MDM: CPT

## 2023-01-26 PROCEDURE — 83605 ASSAY OF LACTIC ACID: CPT

## 2023-01-26 PROCEDURE — 36415 COLL VENOUS BLD VENIPUNCTURE: CPT

## 2023-01-26 PROCEDURE — 87040 BLOOD CULTURE FOR BACTERIA: CPT

## 2023-01-26 RX ORDER — RISPERIDONE 2 MG/1
2 TABLET ORAL 2 TIMES DAILY
Status: ON HOLD | COMMUNITY
End: 2023-09-22

## 2023-01-26 RX ORDER — AMOXICILLIN AND CLAVULANATE POTASSIUM 875; 125 MG/1; MG/1
1 TABLET, FILM COATED ORAL 2 TIMES DAILY
Qty: 10 TABLET | Refills: 0 | Status: SHIPPED | OUTPATIENT
Start: 2023-01-26 | End: 2023-01-26

## 2023-01-26 RX ORDER — CEFAZOLIN 2 G/1
2 INJECTION, POWDER, FOR SOLUTION INTRAMUSCULAR; INTRAVENOUS ONCE
Status: DISCONTINUED | OUTPATIENT
Start: 2023-01-26 | End: 2023-01-26 | Stop reason: HOSPADM

## 2023-01-26 RX ORDER — AMOXICILLIN AND CLAVULANATE POTASSIUM 875; 125 MG/1; MG/1
1 TABLET, FILM COATED ORAL 2 TIMES DAILY
Qty: 10 TABLET | Refills: 0 | Status: ON HOLD | OUTPATIENT
Start: 2023-01-26 | End: 2023-01-30 | Stop reason: SDUPTHER

## 2023-01-26 ASSESSMENT — FIBROSIS 4 INDEX: FIB4 SCORE: 1.18

## 2023-01-26 NOTE — ED NOTES
Med Rec complete per patient  No oral antibiotics in the last 30 days per patient  Allergies reviewed  Preferred Pharmacy: [facility] [address]

## 2023-01-26 NOTE — ED PROVIDER NOTES
"  ER Provider Note    Scribed for Corwin Lott M.d. by Karina Leroy. 1/26/2023  1:34 PM    Primary Care Provider: Pcp Pt States None    CHIEF COMPLAINT  Chief Complaint   Patient presents with    Wound Check     Pt states he has wounds on his legs for 1 month.   Scabs, swelling, and redness noted to bilateral lower extremities.   Pt states the wounds are itchy and painful     EXTERNAL RECORDS REVIEWED  Outpatient Notes recent ED visit on 12/10 reviewed    HPI/ROS  LIMITATION TO HISTORY   Select: : None  OUTSIDE HISTORIAN(S):  None    Dio Ramirez is a 52 y.o. male who presents to the ED complaining of multiple wounds to his bilateral legs onset one month ago. Patient reports pain is greater in his right leg compared to his left. He notes that his wounds are mainly concentrated in the joints, such as the knees and ankles. He is unsure where the wounds could have come from. Denies any recent trauma. He states that the pain continues to increase since onset, prompting him to seek further care in the ED today. Patient has associated swelling, pain, and redness to the bilateral legs. Denies any fevers. No alleviating or exacerbating factors reported. Patient uses methamphetamine, with his last use being last night. He notes that he \"snorts, never injects it.\" Patient is currently staying at the homeless shelter. No known drug allergies.     PAST MEDICAL HISTORY  Past Medical History:   Diagnosis Date    Bipolar 2 disorder (HCC) 2/23/2012    Psoriasis        SURGICAL HISTORY  History reviewed. No pertinent surgical history.    FAMILY HISTORY  Family History   Problem Relation Age of Onset    Other Mother         epilepsy    Alcohol/Drug Father        SOCIAL HISTORY   reports that he has quit smoking. His smoking use included cigarettes. He has never used smokeless tobacco. He reports current drug use. Drug: Inhaled. He reports that he does not drink alcohol.    CURRENT " "MEDICATIONS  None    ALLERGIES  Patient has no known allergies.    PHYSICAL EXAM  /68   Pulse 83   Temp 37.1 °C (98.8 °F) (Temporal)   Resp 18   Ht 1.727 m (5' 8\")   Wt 59.2 kg (130 lb 8.2 oz)   SpO2 99%   BMI 19.84 kg/m²   Constitutional: Alert in no apparent distress.  HENT: No signs of trauma, Bilateral external ears normal, Nose normal. Uvula midline.   Eyes: Pupils are equal and reactive, Conjunctiva normal, Non-icteric.   Neck: Normal range of motion, No tenderness, Supple, No stridor.   Lymphatic: No lymphadenopathy noted.   Cardiovascular: Regular rate and rhythm, no murmurs.   Thorax & Lungs: Normal breath sounds, No respiratory distress, No wheezing, No chest tenderness.   Abdomen: Bowel sounds normal, Soft, No tenderness, No peritoneal signs, No masses, No pulsatile masses.   Skin: Warm, Dry. There is erythema of the bilateral legs with right greater than left, multiple scabs in various stages of healing.   Back: No bony tenderness, No CVA tenderness.   Extremities: Intact distal pulses, No edema, No tenderness, No cyanosis. No crepitus.   Musculoskeletal: Good range of motion in all major joints. No tenderness to palpation or major deformities noted.   Neurologic: Alert , Normal motor function, Normal sensory function, No focal deficits noted.   Psychiatric: Affect normal, Judgment normal, Mood normal.       DIAGNOSTIC STUDIES    Labs:   Results for orders placed or performed during the hospital encounter of 01/26/23   CBC WITH DIFFERENTIAL   Result Value Ref Range    WBC 12.9 (H) 4.8 - 10.8 K/uL    RBC 4.30 (L) 4.70 - 6.10 M/uL    Hemoglobin 12.3 (L) 14.0 - 18.0 g/dL    Hematocrit 37.1 (L) 42.0 - 52.0 %    MCV 86.3 81.4 - 97.8 fL    MCH 28.6 27.0 - 33.0 pg    MCHC 33.2 (L) 33.7 - 35.3 g/dL    RDW 39.3 35.9 - 50.0 fL    Platelet Count 289 164 - 446 K/uL    MPV 8.9 (L) 9.0 - 12.9 fL    Neutrophils-Polys 77.70 (H) 44.00 - 72.00 %    Lymphocytes 9.30 (L) 22.00 - 41.00 %    Monocytes 7.90 0.00 - " 13.40 %    Eosinophils 4.20 0.00 - 6.90 %    Basophils 0.50 0.00 - 1.80 %    Immature Granulocytes 0.40 0.00 - 0.90 %    Nucleated RBC 0.00 /100 WBC    Neutrophils (Absolute) 10.02 (H) 1.82 - 7.42 K/uL    Lymphs (Absolute) 1.20 1.00 - 4.80 K/uL    Monos (Absolute) 1.02 (H) 0.00 - 0.85 K/uL    Eos (Absolute) 0.54 (H) 0.00 - 0.51 K/uL    Baso (Absolute) 0.07 0.00 - 0.12 K/uL    Immature Granulocytes (abs) 0.05 0.00 - 0.11 K/uL    NRBC (Absolute) 0.00 K/uL   CMP   Result Value Ref Range    Sodium 132 (L) 135 - 145 mmol/L    Potassium 4.4 3.6 - 5.5 mmol/L    Chloride 98 96 - 112 mmol/L    Co2 23 20 - 33 mmol/L    Anion Gap 11.0 7.0 - 16.0    Glucose 112 (H) 65 - 99 mg/dL    Bun 6 (L) 8 - 22 mg/dL    Creatinine 0.59 0.50 - 1.40 mg/dL    Calcium 9.0 8.5 - 10.5 mg/dL    AST(SGOT) 17 12 - 45 U/L    ALT(SGPT) 10 2 - 50 U/L    Alkaline Phosphatase 93 30 - 99 U/L    Total Bilirubin 0.4 0.1 - 1.5 mg/dL    Albumin 3.5 3.2 - 4.9 g/dL    Total Protein 7.2 6.0 - 8.2 g/dL    Globulin 3.7 (H) 1.9 - 3.5 g/dL    A-G Ratio 0.9 g/dL   LACTIC ACID   Result Value Ref Range    Lactic Acid 1.4 0.5 - 2.0 mmol/L   Lactic Acid   Result Value Ref Range    Lactic Acid 1.0 0.5 - 2.0 mmol/L   CORRECTED CALCIUM   Result Value Ref Range    Correct Calcium 9.4 8.5 - 10.5 mg/dL   ESTIMATED GFR   Result Value Ref Range    GFR (CKD-EPI) 115 >60 mL/min/1.73 m 2        Radiology:   The attending emergency physician has independently interpreted the diagnostic imaging associated with this visit and am waiting the final reading from the radiologist.   US-EXTREMITY VENOUS LOWER UNILAT RIGHT   Final Result            COURSE & MEDICAL DECISION MAKING     ED Observation Status? Yes; I am placing the patient in to an observation status due to a diagnostic uncertainty as well as therapeutic intensity. Patient placed in observation status at 1:50 PM, 1/26/2023.     Observation plan is as follows: lab work and possible admission     Upon Reevaluation, the patient's  condition has: not improved; and will be escalated to hospitalization. However, patient is declining admission at this time and is leaving against medical advice, and will be discharged home.     Patient discharged from ED Observation status at 4:37 PM (Time) 1/26/23 (Date).     1:34 PM - Patient was evaluated at bedside. After my exam, I explained to the patient the plan of care, which includes treating him with antibiotics as well as obtaining lab work for further evaluation. I also discussed with him the possibility of admission. Patient states that he does not want to stay, as he might lose his bed at the shelter. I explained to him the risks of not staying in the hospital. I informed him that I will speak with a  to talk about options regarding his housing situation. Patient understands and verbalizes agreement to plan of care. Patient will be treated with Ancef 2 g for his symptoms. Ordered for lactic acid, Blood culture, CBC with diff, CMP, and lactic acid.     2:36 PM - Paged Hospitalist     3:00 PM - Patient was reevaluatd at bedside. I discussed with him the plan for admission. Patient understands, and verbalizes agreement to the plan under the condition that the shelter allows him to keep his place there while he stays at the hospital.     3:10 PM - Hospitalist responded. I discussed the patient's case and the above findings with Dr. Patino (Hospitalist) who agrees to evaluate the patient for hospitalization.    4:00 PM - Multiple people have tried calling to ensure that patient can keep his bed at the shelter.     4:20 PM - Patient reevalauted at bedside. I informed the patient that we are still trying to contact the shelter so he can be admitted and still keep his bed. Patient understands and verbalizes agreement to plan of care.     4:37 PM - Because we have been unsuccessful contacting [facility] despite numerous tries, patient is declining hospital admission at this time, and would  like to be discharged home to keep his bed at the shelter. I discussed with the patient the risks of their decision to leave without receiving the appropriate medical care. I discussed with the patient the risks of their decision to refuse or withhold consent to receive appropriate medical care. The patient has the capacity to understand the risks and benefits described above.     The patient is not intoxicated clinically, the patient's is alert and oriented and able to make a good decision in my opinion. I discussed alternative treatments with the patient. The patient was given discharge instructions and a followup plan as documented in the medical record. I have asked the patient to return at any time to the emergency department for any reason.     INITIAL ASSESSMENT AND PLAN  Care Narrative: Patient with wounds to bilateral lower extremities that are concerning for cellulitis specifically right lower extremity.  Patient with elevated white blood cell count.  I encourage patient to stay and receive wound care and multiple attempts were made to help patient ensure his bed will still be there at a shelter.  Patient reports that despite this he still wishes to leave.  I discussed with this patient that if he chooses to leave and does not return and does not receive wound care he may develop a infection that spreads to his bone and may result in a loss of limb or life.    ADDITIONAL PROBLEM LIST AND DISPOSITION      I have discussed management of the patient with the following physicians and NIYA's:  Dr. Patino (Hospitalist)     Discussion of management with other [facility] or appropriate source(s): Social Work        Escalation of care considered, and ultimately not performed: after discussion with the patient / family, they have elected to decline an escalation in care.    Barriers to care at this time, including but not limited to: Patient is homeless.     Decision tools and prescription drugs considered including, but  not limited to: Antibiotics augmentin .    Patient will be discharged home.      FOLLOW UP:  [facility], Emergency Dept  [address] [address] [phone]    If symptoms worsen    [facility]  [address] [facility] [address]  [phone]  In 1 day  For wound re-check      OUTPATIENT MEDICATIONS:  Discharge Medication List as of 1/26/2023  4:40 PM        START taking these medications    Details   amoxicillin-clavulanate (AUGMENTIN) 875-125 MG Tab Take 1 Tablet by mouth 2 times a day for 5 days., Disp-10 Tablet, R-0, Normal              FINAL DIAGNOSIS  1. Wound infection    2. Cellulitis of right lower extremity        The note accurately reflects work and decisions made by me.  Corwin Lott M.D.  1/26/2023  5:00 PM

## 2023-01-26 NOTE — ED TRIAGE NOTES
"Chief Complaint   Patient presents with   • Wound Check     Pt states he has wounds on his legs for 1 month.   Scabs, swelling, and redness noted to bilateral lower extremities.   Pt states the wounds are itchy and painful       /68   Pulse 83   Temp 37.1 °C (98.8 °F) (Temporal)   Resp 18   Ht 1.727 m (5' 8\")   Wt 59.2 kg (130 lb 8.2 oz)   SpO2 99%   BMI 19.84 kg/m²   "

## 2023-01-26 NOTE — DISCHARGE PLANNING
ERP requesting SW to contact [facility] as Pt has a bed there and is concerned he will loose his bed if admitted.     SW attempting to call the shelter, there is no answer.   SW will continue to make calls to the shelter and will update Patient and ERP once contact has been made with [facility].

## 2023-01-27 NOTE — DISCHARGE PLANNING
SW is attempting to call [facility] to discuss holding Pt. Bed while he is admitted to hospital.   No answer to [facility] Phone at this time.

## 2023-01-27 NOTE — DISCHARGE INSTRUCTIONS
"Please discuss the following findings with your regular doctor:  US-EXTREMITY VENOUS LOWER UNILAT RIGHT   Final Result        Labs Reviewed   CBC WITH DIFFERENTIAL - Abnormal; Notable for the following components:       Result Value    WBC 12.9 (*)     RBC 4.30 (*)     Hemoglobin 12.3 (*)     Hematocrit 37.1 (*)     MCHC 33.2 (*)     MPV 8.9 (*)     Neutrophils-Polys 77.70 (*)     Lymphocytes 9.30 (*)     Neutrophils (Absolute) 10.02 (*)     Monos (Absolute) 1.02 (*)     Eos (Absolute) 0.54 (*)     All other components within normal limits   COMP METABOLIC PANEL - Abnormal; Notable for the following components:    Sodium 132 (*)     Glucose 112 (*)     Bun 6 (*)     Globulin 3.7 (*)     All other components within normal limits   LACTIC ACID   LACTIC ACID   CORRECTED CALCIUM   ESTIMATED GFR   BLOOD CULTURE    Narrative:     1 of 2 for Blood Culture x 2 sites order. Per Hospital  Policy: Only change Specimen Src: to \"Line\" if specified by  physician order.   BLOOD CULTURE    Narrative:     2 of 2 blood culture x2  Sites order. Per Hospital Policy:  Only change Specimen Src: to \"Line\" if specified by physician  order.   LACTIC ACID   LACTIC ACID       "

## 2023-01-27 NOTE — ED NOTES
AMA and discharge paperwork signed. Prescription sent to pharmacy. Pt has a plan to return when housing and belongings are taken care of.

## 2023-01-28 ENCOUNTER — HOSPITAL ENCOUNTER (OUTPATIENT)
Facility: MEDICAL CENTER | Age: 53
End: 2023-01-30
Attending: EMERGENCY MEDICINE | Admitting: INTERNAL MEDICINE
Payer: COMMERCIAL

## 2023-01-28 DIAGNOSIS — L03.116 CELLULITIS OF LEFT LOWER EXTREMITY: ICD-10-CM

## 2023-01-28 DIAGNOSIS — L03.115 CELLULITIS OF RIGHT LOWER EXTREMITY: ICD-10-CM

## 2023-01-28 DIAGNOSIS — A41.9 SEPSIS WITHOUT ACUTE ORGAN DYSFUNCTION, DUE TO UNSPECIFIED ORGANISM (HCC): ICD-10-CM

## 2023-01-28 PROBLEM — Z59.00 HOMELESS: Status: ACTIVE | Noted: 2023-01-28

## 2023-01-28 PROBLEM — L08.9 WOUND INFECTION: Status: ACTIVE | Noted: 2023-01-28

## 2023-01-28 PROBLEM — F15.10 METHAMPHETAMINE USE (HCC): Status: ACTIVE | Noted: 2023-01-28

## 2023-01-28 PROBLEM — E87.20 LACTIC ACIDOSIS: Status: ACTIVE | Noted: 2023-01-28

## 2023-01-28 PROBLEM — T14.8XXA WOUND INFECTION: Status: ACTIVE | Noted: 2023-01-28

## 2023-01-28 LAB
ALBUMIN SERPL BCP-MCNC: 3.5 G/DL (ref 3.2–4.9)
ALBUMIN/GLOB SERPL: 0.9 G/DL
ALP SERPL-CCNC: 86 U/L (ref 30–99)
ALT SERPL-CCNC: 8 U/L (ref 2–50)
ANION GAP SERPL CALC-SCNC: 12 MMOL/L (ref 7–16)
APPEARANCE UR: CLEAR
AST SERPL-CCNC: 11 U/L (ref 12–45)
BASOPHILS # BLD AUTO: 0.9 % (ref 0–1.8)
BASOPHILS # BLD: 0.07 K/UL (ref 0–0.12)
BILIRUB SERPL-MCNC: <0.2 MG/DL (ref 0.1–1.5)
BILIRUB UR QL STRIP.AUTO: NEGATIVE
BUN SERPL-MCNC: 10 MG/DL (ref 8–22)
CALCIUM ALBUM COR SERPL-MCNC: 9.1 MG/DL (ref 8.5–10.5)
CALCIUM SERPL-MCNC: 8.7 MG/DL (ref 8.5–10.5)
CHLORIDE SERPL-SCNC: 100 MMOL/L (ref 96–112)
CO2 SERPL-SCNC: 23 MMOL/L (ref 20–33)
COLOR UR: YELLOW
CREAT SERPL-MCNC: 0.8 MG/DL (ref 0.5–1.4)
EOSINOPHIL # BLD AUTO: 0.99 K/UL (ref 0–0.51)
EOSINOPHIL NFR BLD: 12.3 % (ref 0–6.9)
ERYTHROCYTE [DISTWIDTH] IN BLOOD BY AUTOMATED COUNT: 39.3 FL (ref 35.9–50)
GFR SERPLBLD CREATININE-BSD FMLA CKD-EPI: 105 ML/MIN/1.73 M 2
GLOBULIN SER CALC-MCNC: 3.7 G/DL (ref 1.9–3.5)
GLUCOSE SERPL-MCNC: 90 MG/DL (ref 65–99)
GLUCOSE UR STRIP.AUTO-MCNC: NEGATIVE MG/DL
HCT VFR BLD AUTO: 33.5 % (ref 42–52)
HGB BLD-MCNC: 11.1 G/DL (ref 14–18)
IMM GRANULOCYTES # BLD AUTO: 0.03 K/UL (ref 0–0.11)
IMM GRANULOCYTES NFR BLD AUTO: 0.4 % (ref 0–0.9)
KETONES UR STRIP.AUTO-MCNC: NEGATIVE MG/DL
LACTATE SERPL-SCNC: 2.1 MMOL/L (ref 0.5–2)
LACTATE SERPL-SCNC: 2.2 MMOL/L (ref 0.5–2)
LACTATE SERPL-SCNC: 2.7 MMOL/L (ref 0.5–2)
LEUKOCYTE ESTERASE UR QL STRIP.AUTO: NEGATIVE
LYMPHOCYTES # BLD AUTO: 1.76 K/UL (ref 1–4.8)
LYMPHOCYTES NFR BLD: 21.9 % (ref 22–41)
MCH RBC QN AUTO: 28.6 PG (ref 27–33)
MCHC RBC AUTO-ENTMCNC: 33.1 G/DL (ref 33.7–35.3)
MCV RBC AUTO: 86.3 FL (ref 81.4–97.8)
MICRO URNS: NORMAL
MONOCYTES # BLD AUTO: 0.85 K/UL (ref 0–0.85)
MONOCYTES NFR BLD AUTO: 10.6 % (ref 0–13.4)
NEUTROPHILS # BLD AUTO: 4.32 K/UL (ref 1.82–7.42)
NEUTROPHILS NFR BLD: 53.9 % (ref 44–72)
NITRITE UR QL STRIP.AUTO: NEGATIVE
NRBC # BLD AUTO: 0 K/UL
NRBC BLD-RTO: 0 /100 WBC
PH UR STRIP.AUTO: 8 [PH] (ref 5–8)
PLATELET # BLD AUTO: 310 K/UL (ref 164–446)
PMV BLD AUTO: 8.2 FL (ref 9–12.9)
POTASSIUM SERPL-SCNC: 3.8 MMOL/L (ref 3.6–5.5)
PROT SERPL-MCNC: 7.2 G/DL (ref 6–8.2)
PROT UR QL STRIP: NEGATIVE MG/DL
RBC # BLD AUTO: 3.88 M/UL (ref 4.7–6.1)
RBC UR QL AUTO: NEGATIVE
SODIUM SERPL-SCNC: 135 MMOL/L (ref 135–145)
SP GR UR STRIP.AUTO: 1.01
UROBILINOGEN UR STRIP.AUTO-MCNC: 1 MG/DL
WBC # BLD AUTO: 8 K/UL (ref 4.8–10.8)

## 2023-01-28 PROCEDURE — 700105 HCHG RX REV CODE 258: Performed by: EMERGENCY MEDICINE

## 2023-01-28 PROCEDURE — 87040 BLOOD CULTURE FOR BACTERIA: CPT | Mod: 91

## 2023-01-28 PROCEDURE — 96365 THER/PROPH/DIAG IV INF INIT: CPT

## 2023-01-28 PROCEDURE — 81003 URINALYSIS AUTO W/O SCOPE: CPT

## 2023-01-28 PROCEDURE — 700111 HCHG RX REV CODE 636 W/ 250 OVERRIDE (IP): Performed by: INTERNAL MEDICINE

## 2023-01-28 PROCEDURE — 700111 HCHG RX REV CODE 636 W/ 250 OVERRIDE (IP): Performed by: EMERGENCY MEDICINE

## 2023-01-28 PROCEDURE — G0378 HOSPITAL OBSERVATION PER HR: HCPCS

## 2023-01-28 PROCEDURE — A9270 NON-COVERED ITEM OR SERVICE: HCPCS | Performed by: INTERNAL MEDICINE

## 2023-01-28 PROCEDURE — 36415 COLL VENOUS BLD VENIPUNCTURE: CPT

## 2023-01-28 PROCEDURE — 96375 TX/PRO/DX INJ NEW DRUG ADDON: CPT

## 2023-01-28 PROCEDURE — 83605 ASSAY OF LACTIC ACID: CPT | Mod: 91

## 2023-01-28 PROCEDURE — 99285 EMERGENCY DEPT VISIT HI MDM: CPT

## 2023-01-28 PROCEDURE — 99222 1ST HOSP IP/OBS MODERATE 55: CPT | Performed by: INTERNAL MEDICINE

## 2023-01-28 PROCEDURE — 700102 HCHG RX REV CODE 250 W/ 637 OVERRIDE(OP): Performed by: INTERNAL MEDICINE

## 2023-01-28 PROCEDURE — 700105 HCHG RX REV CODE 258: Performed by: INTERNAL MEDICINE

## 2023-01-28 PROCEDURE — 85025 COMPLETE CBC W/AUTO DIFF WBC: CPT

## 2023-01-28 PROCEDURE — 80053 COMPREHEN METABOLIC PANEL: CPT

## 2023-01-28 RX ORDER — SODIUM CHLORIDE, SODIUM LACTATE, POTASSIUM CHLORIDE, AND CALCIUM CHLORIDE .6; .31; .03; .02 G/100ML; G/100ML; G/100ML; G/100ML
500 INJECTION, SOLUTION INTRAVENOUS
Status: DISCONTINUED | OUTPATIENT
Start: 2023-01-28 | End: 2023-01-30 | Stop reason: HOSPADM

## 2023-01-28 RX ORDER — SODIUM CHLORIDE, SODIUM LACTATE, POTASSIUM CHLORIDE, CALCIUM CHLORIDE 600; 310; 30; 20 MG/100ML; MG/100ML; MG/100ML; MG/100ML
INJECTION, SOLUTION INTRAVENOUS CONTINUOUS
Status: DISCONTINUED | OUTPATIENT
Start: 2023-01-28 | End: 2023-01-30 | Stop reason: HOSPADM

## 2023-01-28 RX ORDER — RISPERIDONE 2 MG/1
2 TABLET ORAL 2 TIMES DAILY
Status: DISCONTINUED | OUTPATIENT
Start: 2023-01-28 | End: 2023-01-30 | Stop reason: HOSPADM

## 2023-01-28 RX ORDER — AMOXICILLIN 250 MG
2 CAPSULE ORAL 2 TIMES DAILY
Status: DISCONTINUED | OUTPATIENT
Start: 2023-01-28 | End: 2023-01-30 | Stop reason: HOSPADM

## 2023-01-28 RX ORDER — CEFAZOLIN 2 G/1
2 INJECTION, POWDER, FOR SOLUTION INTRAMUSCULAR; INTRAVENOUS ONCE
Status: COMPLETED | OUTPATIENT
Start: 2023-01-28 | End: 2023-01-28

## 2023-01-28 RX ORDER — SODIUM CHLORIDE, SODIUM LACTATE, POTASSIUM CHLORIDE, AND CALCIUM CHLORIDE .6; .31; .03; .02 G/100ML; G/100ML; G/100ML; G/100ML
30 INJECTION, SOLUTION INTRAVENOUS ONCE
Status: COMPLETED | OUTPATIENT
Start: 2023-01-28 | End: 2023-01-28

## 2023-01-28 RX ORDER — POLYETHYLENE GLYCOL 3350 17 G/17G
1 POWDER, FOR SOLUTION ORAL
Status: DISCONTINUED | OUTPATIENT
Start: 2023-01-28 | End: 2023-01-30 | Stop reason: HOSPADM

## 2023-01-28 RX ORDER — HYDROMORPHONE HYDROCHLORIDE 1 MG/ML
0.25 INJECTION, SOLUTION INTRAMUSCULAR; INTRAVENOUS; SUBCUTANEOUS
Status: DISCONTINUED | OUTPATIENT
Start: 2023-01-28 | End: 2023-01-30 | Stop reason: HOSPADM

## 2023-01-28 RX ORDER — BISACODYL 10 MG
10 SUPPOSITORY, RECTAL RECTAL
Status: DISCONTINUED | OUTPATIENT
Start: 2023-01-28 | End: 2023-01-30 | Stop reason: HOSPADM

## 2023-01-28 RX ORDER — OXYCODONE HYDROCHLORIDE 5 MG/1
5 TABLET ORAL
Status: DISCONTINUED | OUTPATIENT
Start: 2023-01-28 | End: 2023-01-30 | Stop reason: HOSPADM

## 2023-01-28 RX ORDER — OXYCODONE HYDROCHLORIDE 5 MG/1
2.5 TABLET ORAL
Status: DISCONTINUED | OUTPATIENT
Start: 2023-01-28 | End: 2023-01-30 | Stop reason: HOSPADM

## 2023-01-28 RX ORDER — ACETAMINOPHEN 325 MG/1
650 TABLET ORAL EVERY 6 HOURS PRN
Status: DISCONTINUED | OUTPATIENT
Start: 2023-01-28 | End: 2023-01-30 | Stop reason: HOSPADM

## 2023-01-28 RX ORDER — SODIUM CHLORIDE, SODIUM LACTATE, POTASSIUM CHLORIDE, CALCIUM CHLORIDE 600; 310; 30; 20 MG/100ML; MG/100ML; MG/100ML; MG/100ML
1000 INJECTION, SOLUTION INTRAVENOUS ONCE
Status: COMPLETED | OUTPATIENT
Start: 2023-01-28 | End: 2023-01-28

## 2023-01-28 RX ADMIN — RISPERIDONE 2 MG: 2 TABLET ORAL at 19:40

## 2023-01-28 RX ADMIN — AMPICILLIN AND SULBACTAM 3 G: 1; 2 INJECTION, POWDER, FOR SOLUTION INTRAMUSCULAR; INTRAVENOUS at 19:38

## 2023-01-28 RX ADMIN — SENNOSIDES AND DOCUSATE SODIUM 2 TABLET: 50; 8.6 TABLET ORAL at 19:40

## 2023-01-28 RX ADMIN — SODIUM CHLORIDE, POTASSIUM CHLORIDE, SODIUM LACTATE AND CALCIUM CHLORIDE: 600; 310; 30; 20 INJECTION, SOLUTION INTRAVENOUS at 20:09

## 2023-01-28 RX ADMIN — SODIUM CHLORIDE, POTASSIUM CHLORIDE, SODIUM LACTATE AND CALCIUM CHLORIDE 1000 ML: 600; 310; 30; 20 INJECTION, SOLUTION INTRAVENOUS at 15:25

## 2023-01-28 RX ADMIN — SODIUM CHLORIDE, POTASSIUM CHLORIDE, SODIUM LACTATE AND CALCIUM CHLORIDE 1824 ML: 600; 310; 30; 20 INJECTION, SOLUTION INTRAVENOUS at 18:25

## 2023-01-28 RX ADMIN — RIVAROXABAN 10 MG: 10 TABLET, FILM COATED ORAL at 19:40

## 2023-01-28 RX ADMIN — CEFAZOLIN 2 G: 2 INJECTION, POWDER, FOR SOLUTION INTRAMUSCULAR; INTRAVENOUS at 19:32

## 2023-01-28 ASSESSMENT — LIFESTYLE VARIABLES
HAVE YOU EVER FELT YOU SHOULD CUT DOWN ON YOUR DRINKING: NO
EVER HAD A DRINK FIRST THING IN THE MORNING TO STEADY YOUR NERVES TO GET RID OF A HANGOVER: NO
TOTAL SCORE: 0
DO YOU DRINK ALCOHOL: NO
HOW MANY TIMES IN THE PAST YEAR HAVE YOU HAD 5 OR MORE DRINKS IN A DAY: 0
TOTAL SCORE: 0
EVER FELT BAD OR GUILTY ABOUT YOUR DRINKING: NO
HAVE PEOPLE ANNOYED YOU BY CRITICIZING YOUR DRINKING: NO
ALCOHOL_USE: NO
ON A TYPICAL DAY WHEN YOU DRINK ALCOHOL HOW MANY DRINKS DO YOU HAVE: 0
AVERAGE NUMBER OF DAYS PER WEEK YOU HAVE A DRINK CONTAINING ALCOHOL: 0
CONSUMPTION TOTAL: NEGATIVE
TOTAL SCORE: 0

## 2023-01-28 ASSESSMENT — PATIENT HEALTH QUESTIONNAIRE - PHQ9
2. FEELING DOWN, DEPRESSED, IRRITABLE, OR HOPELESS: NOT AT ALL
SUM OF ALL RESPONSES TO PHQ9 QUESTIONS 1 AND 2: 0
1. LITTLE INTEREST OR PLEASURE IN DOING THINGS: NOT AT ALL

## 2023-01-28 ASSESSMENT — FIBROSIS 4 INDEX
FIB4 SCORE: 0.66
FIB4 SCORE: 0.99

## 2023-01-28 ASSESSMENT — ENCOUNTER SYMPTOMS
SEIZURES: 0
EYE REDNESS: 0
SHORTNESS OF BREATH: 0
WEAKNESS: 0
MYALGIAS: 0
ABDOMINAL PAIN: 0
NERVOUS/ANXIOUS: 0
COUGH: 0
PALPITATIONS: 0
WHEEZING: 0
INSOMNIA: 0
TREMORS: 0
HEADACHES: 0
FEVER: 0
DIARRHEA: 0
CHILLS: 0
BLOOD IN STOOL: 0
CONSTIPATION: 0
VOMITING: 0
FOCAL WEAKNESS: 0
HEMOPTYSIS: 0
DIZZINESS: 0
FALLS: 0
NAUSEA: 0
LOSS OF CONSCIOUSNESS: 0
EYE PAIN: 0

## 2023-01-28 NOTE — ED PROVIDER NOTES
ER Provider Note    Scribed for Jaxon Anderson D.O. by Daniel Jeff. 1/28/2023  1:49 PM    Primary Care Provider: Pcp Pt States None    CHIEF COMPLAINT  Chief Complaint   Patient presents with    Sent by MD     Patient left AMA on 1/26 due to concern over losing bed in shelter, came back today to be admitted. Patient has been taking antibiotics as prescribed since leaving the ER. Patient has multiple wounds to his bilateral legs x one month. Right leg is worse than left, wounds are mainly concentrated in the joints, such as the knees and ankles. -trauma. Patient uses methamphetamine, reports last use was 3 days ago.      LIMITATION TO HISTORY   Select: : None    HPI/ROS  OUTSIDE HISTORIAN(S):  None    EXTERNAL RECORDS REVIEWED  Inpatient Notes shows the patient was here 2 days ago. Recommended admission for cellulitis to the legs but patient left AMA    Dio Ramirez is a 52 y.o. male who presents to the ED for mild to moderate leg wounds onset a month ago. The patient was seen here on 1/26 for leg wounds and left AMA for concerns of losing his bed at the shelter. He says he was told  his wounds are from bed bugs but is unsure. He was prescribed antibiotics and has been taking them since leaving the ER. He returns today for the same symptoms and states he would like to be admitted. Denies fever. No alleviating or exacerbating factors reported.    PAST MEDICAL HISTORY  Past Medical History:   Diagnosis Date    Bipolar 2 disorder (HCC) 2/23/2012    Psoriasis        SURGICAL HISTORY  History reviewed. No pertinent surgical history.    FAMILY HISTORY  Family History   Problem Relation Age of Onset    Other Mother         epilepsy    Alcohol/Drug Father        SOCIAL HISTORY   reports that he has quit smoking. His smoking use included cigarettes. He has never used smokeless tobacco. He reports current drug use. Drug: Inhaled. He reports that he does not drink alcohol.    CURRENT MEDICATIONS  Previous  "Medications    AMOXICILLIN-CLAVULANATE (AUGMENTIN) 875-125 MG TAB    Take 1 Tablet by mouth 2 times a day for 5 days.    RISPERIDONE (RISPERDAL) 2 MG TAB    Take 2 mg by mouth 2 times a day. At 0900 and 2100       ALLERGIES  Patient has no known allergies.    PHYSICAL EXAM  BP 98/57   Pulse 83   Temp 37 °C (98.6 °F) (Temporal)   Resp 18   Ht 1.727 m (5' 8\")   Wt 60.8 kg (134 lb 0.6 oz)   SpO2 98% Comment: RA  BMI 20.38 kg/m²   General: No acute distress.  HENT: Normocephalic, Mucus membranes are moist.   Chest: Lungs have even and unlabored respirations, Clear to auscultation.   Cardiovascular: Regular rate and rhythm, No peripheral cyanosis.  Abdomen: Non distended.  Neuro: Awake, Conversive, Able to relay recent events.  Psychiatric: Calm and cooperative.   Extremities: Joints have no swelling or tenderness, Neurovascularly intact.  Skin: Lower extremity has multiple open sores with surrounding erythema    DIAGNOSTIC STUDIES & PROCEDURES    Labs:   Results for orders placed or performed during the hospital encounter of 01/28/23   LACTIC ACID   Result Value Ref Range    Lactic Acid 2.2 (H) 0.5 - 2.0 mmol/L   LACTIC ACID   Result Value Ref Range    Lactic Acid 2.7 (H) 0.5 - 2.0 mmol/L   CBC WITH DIFFERENTIAL   Result Value Ref Range    WBC 8.0 4.8 - 10.8 K/uL    RBC 3.88 (L) 4.70 - 6.10 M/uL    Hemoglobin 11.1 (L) 14.0 - 18.0 g/dL    Hematocrit 33.5 (L) 42.0 - 52.0 %    MCV 86.3 81.4 - 97.8 fL    MCH 28.6 27.0 - 33.0 pg    MCHC 33.1 (L) 33.7 - 35.3 g/dL    RDW 39.3 35.9 - 50.0 fL    Platelet Count 310 164 - 446 K/uL    MPV 8.2 (L) 9.0 - 12.9 fL    Neutrophils-Polys 53.90 44.00 - 72.00 %    Lymphocytes 21.90 (L) 22.00 - 41.00 %    Monocytes 10.60 0.00 - 13.40 %    Eosinophils 12.30 (H) 0.00 - 6.90 %    Basophils 0.90 0.00 - 1.80 %    Immature Granulocytes 0.40 0.00 - 0.90 %    Nucleated RBC 0.00 /100 WBC    Neutrophils (Absolute) 4.32 1.82 - 7.42 K/uL    Lymphs (Absolute) 1.76 1.00 - 4.80 K/uL    Monos " (Absolute) 0.85 0.00 - 0.85 K/uL    Eos (Absolute) 0.99 (H) 0.00 - 0.51 K/uL    Baso (Absolute) 0.07 0.00 - 0.12 K/uL    Immature Granulocytes (abs) 0.03 0.00 - 0.11 K/uL    NRBC (Absolute) 0.00 K/uL   COMP METABOLIC PANEL   Result Value Ref Range    Sodium 135 135 - 145 mmol/L    Potassium 3.8 3.6 - 5.5 mmol/L    Chloride 100 96 - 112 mmol/L    Co2 23 20 - 33 mmol/L    Anion Gap 12.0 7.0 - 16.0    Glucose 90 65 - 99 mg/dL    Bun 10 8 - 22 mg/dL    Creatinine 0.80 0.50 - 1.40 mg/dL    Calcium 8.7 8.5 - 10.5 mg/dL    AST(SGOT) 11 (L) 12 - 45 U/L    ALT(SGPT) 8 2 - 50 U/L    Alkaline Phosphatase 86 30 - 99 U/L    Total Bilirubin <0.2 0.1 - 1.5 mg/dL    Albumin 3.5 3.2 - 4.9 g/dL    Total Protein 7.2 6.0 - 8.2 g/dL    Globulin 3.7 (H) 1.9 - 3.5 g/dL    A-G Ratio 0.9 g/dL   CORRECTED CALCIUM   Result Value Ref Range    Correct Calcium 9.1 8.5 - 10.5 mg/dL   ESTIMATED GFR   Result Value Ref Range    GFR (CKD-EPI) 105 >60 mL/min/1.73 m 2   All labs reviewed by me.    COURSE & MEDICAL DECISION MAKING    ED Observation Status? Yes; I am placing the patient in to an observation status due to a diagnostic uncertainty as well as therapeutic intensity. Patient placed in observation status at 1:59 PM, 1/28/2023.     Observation plan is as follows: Will evaluate for sepsis and osteomyelitis concerns.    Upon Reevaluation, the patient's condition has: not improved; and will be escalated to hospitalization.    Patient discharged from ED Observation status at 5:18 PM, 1/28/2023.     INITIAL ASSESSMENT AND PLAN  Care Narrative: Patient left AMA two days ago with recommendation for wound care and antibiotics. He got his antibiotics prescription and has been taking medication. He had not been changing dressing so he does not know what his wounds look like now compared to two days ago. Removed dressing to bilateral lower extremities to skin that shows multiple crusted sores; some opened without obvious signs of cellulitis. No signs of  vascular or neurological compromise. There is concern for sepsis, cellulitis, and osteomyelitis.    ER course: This patient has a multiple sores and scabs along the lower extremities bilaterally.  He does not have any vascular compromise and his sensation is normal distally.  He is afebrile his white blood cell count is normal but his lactic acid level is elevated initial lactic acid level was 2.2.  There is concerns for sepsis or dehydration IV fluids were given and repeat his lactic acid level did elevated.  This was dehydration expect this to improve sepsis cannot be excluded so the patient is given additional fluids and IV antibiotics.  Spoke with the hospitalist for admission the patient will be admitted for further evaluation and treatment.    1:49 PM - Patient seen and evaluated at bedside. Discussed plan of care, including labs. Patient agrees to plan of care. Ordered Lactic Acid x2, CBC w/ Diff, CMP, and Blood Culture to evaluate.    3:18 PM - The patient will be treated with IV Fluids for his symptoms.    5:18 PM - Patient presented with sores and possibly cellulitis in lower extremities. Lactic acid was mildly elevated 2.2 which could be early sepsis vs. dehydration. IV fluids were given and his lactic acid is now 2.7. With that, sepsis is considered. Sepsis doses of fluids were ordered along with antibiotics. Patient will be admitted for further evaluation treatment. The patient will be treated with Ancef 2 g and IV Fluids for his symptoms.    5:19 PM - Paged Hospitalist    5:27 PM - I discussed the patient's case and the above findings with Dr. Landry (Hospitalist) who will assess the patient for hospitalization.     ADDITIONAL PROBLEM LIST AND DISPOSITION  Problem #1: Sores of unknown source  Problem #2: Homelessness    I have discussed management of the patient with the following physicians and NIYA's: Dr. Landry (Hospitalist)    Discussion of management with other [facility] or appropriate source(s): None        Barriers to care at this time, including but not limited to: Patient is homeless.     Decision tools and prescription drugs considered including, but not limited to: Antibiotics   .    HYDRATION: Based on the patient's presentation of Sepsis the patient was given IV fluids. IV Hydration was used because oral hydration was not adequate alone. Upon recheck following hydration, the patient was not improved.    CRITICAL CARE  The very real possibility of a deterioration of this patient's condition required the highest level of my preparedness for sudden, emergent intervention.  I provided critical care services, which included medication orders, frequent reevaluations of the patient's condition and response to treatment, ordering and reviewing test results, and discussing the case with various consultants.  The critical care time associated with the care of the patient was 30 minutes. Review chart for interventions. This time is exclusive of any other billable procedures.     DISPOSITION:  Patient will be hospitalized by Dr. Landry in guarded condition.    FINAL IMPRESSION   1. Sepsis without acute organ dysfunction, due to unspecified organism (HCC)    2. Cellulitis of left lower extremity    3.      Critical care time was 30 minutes.     Daniel HARO (Mariano), am scribing for, and in the presence of, Jaxon Anderson D.O..    Electronically signed by: Daniel Jeff (Mariano), 1/28/2023    Jaxon HARO D.O. personally performed the services described in this documentation, as scribed by Daniel Jeff in my presence, and it is both accurate and complete.    The note accurately reflects work and decisions made by me.  Jaxon Anderson D.O.  1/28/2023  6:59 PM

## 2023-01-29 LAB
ANION GAP SERPL CALC-SCNC: 9 MMOL/L (ref 7–16)
BUN SERPL-MCNC: 9 MG/DL (ref 8–22)
CALCIUM SERPL-MCNC: 8.2 MG/DL (ref 8.5–10.5)
CHLORIDE SERPL-SCNC: 105 MMOL/L (ref 96–112)
CO2 SERPL-SCNC: 24 MMOL/L (ref 20–33)
CREAT SERPL-MCNC: 0.53 MG/DL (ref 0.5–1.4)
ERYTHROCYTE [DISTWIDTH] IN BLOOD BY AUTOMATED COUNT: 38.7 FL (ref 35.9–50)
GFR SERPLBLD CREATININE-BSD FMLA CKD-EPI: 119 ML/MIN/1.73 M 2
GLUCOSE SERPL-MCNC: 125 MG/DL (ref 65–99)
HCT VFR BLD AUTO: 31.7 % (ref 42–52)
HGB BLD-MCNC: 10.5 G/DL (ref 14–18)
INR PPP: 1.65 (ref 0.87–1.13)
LACTATE SERPL-SCNC: 1.1 MMOL/L (ref 0.5–2)
LACTATE SERPL-SCNC: 1.1 MMOL/L (ref 0.5–2)
LACTATE SERPL-SCNC: 1.2 MMOL/L (ref 0.5–2)
MCH RBC QN AUTO: 28.5 PG (ref 27–33)
MCHC RBC AUTO-ENTMCNC: 33.1 G/DL (ref 33.7–35.3)
MCV RBC AUTO: 86.1 FL (ref 81.4–97.8)
PLATELET # BLD AUTO: 259 K/UL (ref 164–446)
PMV BLD AUTO: 8.1 FL (ref 9–12.9)
POTASSIUM SERPL-SCNC: 3.9 MMOL/L (ref 3.6–5.5)
PROTHROMBIN TIME: 19.2 SEC (ref 12–14.6)
RBC # BLD AUTO: 3.68 M/UL (ref 4.7–6.1)
SCCMEC + MECA PNL NOSE NAA+PROBE: NEGATIVE
SODIUM SERPL-SCNC: 138 MMOL/L (ref 135–145)
WBC # BLD AUTO: 7.1 K/UL (ref 4.8–10.8)

## 2023-01-29 PROCEDURE — A9270 NON-COVERED ITEM OR SERVICE: HCPCS | Performed by: INTERNAL MEDICINE

## 2023-01-29 PROCEDURE — 700105 HCHG RX REV CODE 258: Performed by: INTERNAL MEDICINE

## 2023-01-29 PROCEDURE — 85610 PROTHROMBIN TIME: CPT

## 2023-01-29 PROCEDURE — 87641 MR-STAPH DNA AMP PROBE: CPT

## 2023-01-29 PROCEDURE — G0378 HOSPITAL OBSERVATION PER HR: HCPCS

## 2023-01-29 PROCEDURE — 700111 HCHG RX REV CODE 636 W/ 250 OVERRIDE (IP): Performed by: INTERNAL MEDICINE

## 2023-01-29 PROCEDURE — 99232 SBSQ HOSP IP/OBS MODERATE 35: CPT | Performed by: NURSE PRACTITIONER

## 2023-01-29 PROCEDURE — 80048 BASIC METABOLIC PNL TOTAL CA: CPT

## 2023-01-29 PROCEDURE — 700102 HCHG RX REV CODE 250 W/ 637 OVERRIDE(OP): Performed by: INTERNAL MEDICINE

## 2023-01-29 PROCEDURE — 96366 THER/PROPH/DIAG IV INF ADDON: CPT

## 2023-01-29 PROCEDURE — 36415 COLL VENOUS BLD VENIPUNCTURE: CPT

## 2023-01-29 PROCEDURE — 83605 ASSAY OF LACTIC ACID: CPT | Mod: 91

## 2023-01-29 PROCEDURE — 85027 COMPLETE CBC AUTOMATED: CPT

## 2023-01-29 RX ADMIN — OXYCODONE HYDROCHLORIDE 5 MG: 5 TABLET ORAL at 04:33

## 2023-01-29 RX ADMIN — OXYCODONE HYDROCHLORIDE 5 MG: 5 TABLET ORAL at 19:37

## 2023-01-29 RX ADMIN — AMPICILLIN AND SULBACTAM 3 G: 1; 2 INJECTION, POWDER, FOR SOLUTION INTRAMUSCULAR; INTRAVENOUS at 00:02

## 2023-01-29 RX ADMIN — RISPERIDONE 2 MG: 2 TABLET ORAL at 21:24

## 2023-01-29 RX ADMIN — AMPICILLIN AND SULBACTAM 3 G: 1; 2 INJECTION, POWDER, FOR SOLUTION INTRAMUSCULAR; INTRAVENOUS at 06:03

## 2023-01-29 RX ADMIN — AMPICILLIN AND SULBACTAM 3 G: 1; 2 INJECTION, POWDER, FOR SOLUTION INTRAMUSCULAR; INTRAVENOUS at 12:55

## 2023-01-29 RX ADMIN — RIVAROXABAN 10 MG: 10 TABLET, FILM COATED ORAL at 18:12

## 2023-01-29 RX ADMIN — AMPICILLIN AND SULBACTAM 3 G: 1; 2 INJECTION, POWDER, FOR SOLUTION INTRAMUSCULAR; INTRAVENOUS at 18:10

## 2023-01-29 RX ADMIN — SODIUM CHLORIDE, POTASSIUM CHLORIDE, SODIUM LACTATE AND CALCIUM CHLORIDE: 600; 310; 30; 20 INJECTION, SOLUTION INTRAVENOUS at 22:53

## 2023-01-29 RX ADMIN — SODIUM CHLORIDE, POTASSIUM CHLORIDE, SODIUM LACTATE AND CALCIUM CHLORIDE: 600; 310; 30; 20 INJECTION, SOLUTION INTRAVENOUS at 10:23

## 2023-01-29 RX ADMIN — SENNOSIDES AND DOCUSATE SODIUM 2 TABLET: 50; 8.6 TABLET ORAL at 18:12

## 2023-01-29 RX ADMIN — RISPERIDONE 2 MG: 2 TABLET ORAL at 10:06

## 2023-01-29 ASSESSMENT — ENCOUNTER SYMPTOMS
CHILLS: 0
DIARRHEA: 0
WEAKNESS: 1
VOMITING: 0
NAUSEA: 0
DEPRESSION: 1
FEVER: 0
SHORTNESS OF BREATH: 0
FOCAL WEAKNESS: 0
COUGH: 0
FALLS: 0
DIZZINESS: 0

## 2023-01-29 ASSESSMENT — PATIENT HEALTH QUESTIONNAIRE - PHQ9
SUM OF ALL RESPONSES TO PHQ9 QUESTIONS 1 AND 2: 0
2. FEELING DOWN, DEPRESSED, IRRITABLE, OR HOPELESS: NOT AT ALL
1. LITTLE INTEREST OR PLEASURE IN DOING THINGS: NOT AT ALL

## 2023-01-29 ASSESSMENT — PAIN DESCRIPTION - PAIN TYPE
TYPE: ACUTE PAIN
TYPE: ACUTE PAIN

## 2023-01-29 ASSESSMENT — LIFESTYLE VARIABLES: SUBSTANCE_ABUSE: 1

## 2023-01-29 NOTE — PROGRESS NOTES
4 Eyes Skin Assessment Completed by DEEDEE Larios and DEEDEE Salazar.    Head WDL  Ears WDL  Nose WDL  Mouth WDL  Neck WDL  Breast/Chest WDL  Shoulder Blades WDL  Spine WDL  (R) Arm/Elbow/Hand Scab  (L) Arm/Elbow/Hand Scab  Abdomen WDL  Groin WDL  Scrotum/Coccyx/Buttocks WDL  (R) Leg Redness, Scab, Weeping, and Edema  (L) Leg Redness, Scab, Weeping, and Edema  (R) Heel/Foot/Toe Redness, Swelling, Scab, and Rash  (L) Heel/Foot/Toe Redness, Swelling, Scab, and Rash          Devices In Places Blood Pressure Cuff and Pulse Ox      Interventions In Place Pressure Redistribution Mattress    Possible Skin Injury Yes    Pictures Uploaded Into Epic Yes  Wound Consult Placed Yes  RN Wound Prevention Protocol Ordered Yes

## 2023-01-29 NOTE — ED NOTES
Med rec updated and complete. Allergies reviewed. Pt is currently on Augmentin. Start date 01/26/23-/01/31/23    Home pharmacy [facility] [phone]

## 2023-01-29 NOTE — CARE PLAN
The patient is Stable - Low risk of patient condition declining or worsening    Shift Goals  Clinical Goals: IV fluids/ABX  Patient Goals: Rest  Family Goals: KYLEIGH      Problem: Knowledge Deficit - Standard  Goal: Patient and family/care givers will demonstrate understanding of plan of care, disease process/condition, diagnostic tests and medications  Outcome: Progressing     Problem: Infection - Standard  Goal: Patient will remain free from infection  Outcome: Progressing     Problem: Wound/ / Incision Healing  Goal: Patient's wound/surgical incision will decrease in size and heals properly  Outcome: Progressing

## 2023-01-29 NOTE — ASSESSMENT & PLAN NOTE
Probably due to low tissue perfusion, doubt sepsis (no other criteria)  IV fluids and boluses  Improved

## 2023-01-29 NOTE — HOSPITAL COURSE
Dio Ramirez is a 52 y.o. male who presented 1/28/2023 with Sent by MD (Patient left AMA on 1/26 due to concern over losing bed in shelter, came back today to be admitted. Patient has been taking antibiotics as prescribed since leaving the ER. Patient has multiple wounds to his bilateral legs x one month. Right leg is worse than left, wounds are mainly concentrated in the joints, such as the knees and ankles. -trauma. Patient uses methamphetamine, reports last use was 3 days ago. )    He is a poor historian and odd behavior. He is homeless and admits to smoking weed, methamphetamine use. Denies smoking or alcohol. He has had wounds of his lower extremities for a month, was at the ED a few days ago but left AMA with PO antibiotics due to fear of losing his shelter bed.     He returns because the wounds have worsened but he could not provide any further details.    At the ED, he is afebrile, normotensive.  No leukocytosis. Lactic acidosis.    Patient was admitted for management of his bilateral leg wounds.  Started on Unasyn, no leukocytosis during hospitalization, will be discharged with additional 5 days of Keflex.  He did have an initial elevated lactic acidosis which improved with IV fluids.  Wound care was consulted and recommended patient keep dressings off and apply cream daily.  Patient also encouraged to stop methamphetamine use.  Is discharged back to the homeless shelter.

## 2023-01-29 NOTE — PROGRESS NOTES
Hospital Medicine Daily Progress Note    Date of Service  1/29/2023    Chief Complaint  Dio Ramirez is a 52 y.o. male admitted 1/28/2023 with worsening bilateral lower leg wounds    Hospital Course  Dio Ramirez is a 52 y.o. male who presented 1/28/2023 with Sent by MD (Patient left AMA on 1/26 due to concern over losing bed in shelter, came back today to be admitted. Patient has been taking antibiotics as prescribed since leaving the ER. Patient has multiple wounds to his bilateral legs x one month. Right leg is worse than left, wounds are mainly concentrated in the joints, such as the knees and ankles. -trauma. Patient uses methamphetamine, reports last use was 3 days ago. )    He is a poor historian and odd behavior. He is homeless and admits to smoking weed, methamphetamine use. Denies smoking or alcohol. He has had wounds of his lower extremities for a month, was at the ED a few days ago but left AMA with PO antibiotics due to fear of losing his shelter bed.     He returns because the wounds have worsened but he could not provide any further details.    At the ED, he is afebrile, normotensive.  No leukocytosis. Lactic acidosis.    Interval Problem Update  Patient sitting up in bed eating breakfast, reports that he finally left the emergency department without additional treatment because concerns of keeping a bed at the shelter.  He is tearful and states that he cannot go back to the shelter right now because he has a top bunk bed and is unable to climb into it with his wounds on his legs.  -Pending wound therapy evaluation and management, likely patient will need outpatient management of his wounds  -IV Unasyn can likely transition to oral antibiotics    I have discussed this patient's plan of care and discharge plan at IDT rounds today with Case Management, Nursing, Nursing leadership, and other members of the IDT team.    Consultants/Specialty  None    Code Status  Full  Code    Disposition  Patient is not medically cleared for discharge.   Anticipate discharge to to home with close outpatient follow-up.  I have placed the appropriate orders for post-discharge needs.    Review of Systems  Review of Systems   Constitutional:  Positive for malaise/fatigue. Negative for chills and fever.   Respiratory:  Negative for cough and shortness of breath.    Cardiovascular:  Negative for chest pain and leg swelling.   Gastrointestinal:  Negative for diarrhea, nausea and vomiting.   Genitourinary:  Negative for dysuria.   Musculoskeletal:  Negative for falls.        Leg pain   Neurological:  Positive for weakness. Negative for dizziness and focal weakness.   Psychiatric/Behavioral:  Positive for depression and substance abuse.       Physical Exam  Temp:  [36.3 °C (97.4 °F)-37.3 °C (99.2 °F)] 36.3 °C (97.4 °F)  Pulse:  [72-94] 77  Resp:  [16] 16  BP: ()/(52-76) 112/71  SpO2:  [88 %-100 %] 98 %    Physical Exam  Vitals and nursing note reviewed.   Constitutional:       General: He is not in acute distress.     Appearance: He is cachectic. He is ill-appearing.   HENT:      Head: Normocephalic and atraumatic.      Mouth/Throat:      Mouth: Mucous membranes are dry.      Dentition: Abnormal dentition.   Cardiovascular:      Rate and Rhythm: Normal rate and regular rhythm.      Pulses: Normal pulses.      Heart sounds: Normal heart sounds. No murmur heard.  Pulmonary:      Effort: Pulmonary effort is normal.      Breath sounds: Normal breath sounds. No decreased air movement. No wheezing.   Abdominal:      General: There is no distension.      Palpations: Abdomen is soft.      Tenderness: There is no abdominal tenderness. There is no guarding.   Musculoskeletal:      Right lower leg: No edema.      Left lower leg: No edema.   Skin:     Findings: Abrasion, erythema, laceration and rash present. Rash is crusting.      Comments: Has diffuse wounds bilaterally to both legs up into the thighs    Neurological:      General: No focal deficit present.      Mental Status: He is alert and oriented to person, place, and time.      Cranial Nerves: No cranial nerve deficit.      Motor: No weakness.   Psychiatric:         Attention and Perception: Attention normal.         Mood and Affect: Affect is tearful.         Speech: Speech normal.         Behavior: Behavior normal. Behavior is cooperative.       Fluids    Intake/Output Summary (Last 24 hours) at 1/29/2023 1350  Last data filed at 1/29/2023 0500  Gross per 24 hour   Intake 4501.03 ml   Output 1500 ml   Net 3001.03 ml       Laboratory  Recent Labs     01/26/23  1421 01/28/23  1405 01/29/23  0048   WBC 12.9* 8.0 7.1   RBC 4.30* 3.88* 3.68*   HEMOGLOBIN 12.3* 11.1* 10.5*   HEMATOCRIT 37.1* 33.5* 31.7*   MCV 86.3 86.3 86.1   MCH 28.6 28.6 28.5   MCHC 33.2* 33.1* 33.1*   RDW 39.3 39.3 38.7   PLATELETCT 289 310 259   MPV 8.9* 8.2* 8.1*     Recent Labs     01/26/23  1421 01/28/23  1405 01/29/23  0048   SODIUM 132* 135 138   POTASSIUM 4.4 3.8 3.9   CHLORIDE 98 100 105   CO2 23 23 24   GLUCOSE 112* 90 125*   BUN 6* 10 9   CREATININE 0.59 0.80 0.53   CALCIUM 9.0 8.7 8.2*     Recent Labs     01/29/23  0048   INR 1.65*               Imaging  No orders to display        Assessment/Plan  * Wound infection, lactic acidosis, meth use  Assessment & Plan  Admit to OBSERVATION, medical floor  Antibiotics  Wound Care pending    Methamphetamine use (HCC)  Assessment & Plan  Advise no more methamphetamines    Lactic acidosis  Assessment & Plan  Probably due to low tissue perfusion, doubt sepsis (no other criteria)  IV fluids and boluses  Improved    Homeless  Assessment & Plan  Return to shelter when ready for d/c         VTE prophylaxis: Xarelto 10 mg daily as prophylaxis    I have performed a physical exam and reviewed and updated ROS and Plan today (1/29/2023). In review of yesterday's note (1/28/2023), there are no changes except as documented above.

## 2023-01-29 NOTE — ED NOTES
Leg wounds wrapped and documented. Pt resting comfortably on [facility].

## 2023-01-29 NOTE — H&P
Hospital Medicine History & Physical Note    Date of Service  1/28/2023    Primary Care Physician  Pcp Pt States None    Consultants      Code Status  Full Code    Chief Complaint  Chief Complaint   Patient presents with    Sent by MD     Patient left AMA on 1/26 due to concern over losing bed in shelter, came back today to be admitted. Patient has been taking antibiotics as prescribed since leaving the ER. Patient has multiple wounds to his bilateral legs x one month. Right leg is worse than left, wounds are mainly concentrated in the joints, such as the knees and ankles. -trauma. Patient uses methamphetamine, reports last use was 3 days ago.        History of Presenting Illness  Dio Ramirez is a 52 y.o. male who presented 1/28/2023 with Sent by MD (Patient left AMA on 1/26 due to concern over losing bed in shelter, came back today to be admitted. Patient has been taking antibiotics as prescribed since leaving the ER. Patient has multiple wounds to his bilateral legs x one month. Right leg is worse than left, wounds are mainly concentrated in the joints, such as the knees and ankles. -trauma. Patient uses methamphetamine, reports last use was 3 days ago. )  He is a poor historian and odd behavior. He is homeless and admits to smoking weed, methamphetamine use. Denies smoking or alcohol. He has had wounds of his lower extremities for a month, was at the ED a few days ago but left AMA with PO antibiotics due to fear of losing his shelter bed. He returns because the wounds have worsened but he could not provide any further details.  At the ED, he is afebrile, normotensive.  No leukocytosis. Lactic acidosis.  When I saw him at the ED he is afebrile, hemodynamicallystable  Ulcers/wounds and dressings BLE      I discussed the plan of care with patient and bedside RN.    Review of Systems  Review of Systems   Constitutional:  Negative for chills and fever.   HENT:  Negative for congestion, hearing loss and  nosebleeds.    Eyes:  Negative for pain and redness.   Respiratory:  Negative for cough, hemoptysis, shortness of breath and wheezing.    Cardiovascular:  Negative for chest pain and palpitations.   Gastrointestinal:  Negative for abdominal pain, blood in stool, constipation, diarrhea, nausea and vomiting.   Genitourinary:  Negative for dysuria, frequency and hematuria.   Musculoskeletal:  Negative for falls, joint pain and myalgias.   Skin:  Positive for rash.   Neurological:  Negative for dizziness, tremors, focal weakness, seizures, loss of consciousness, weakness and headaches.   Psychiatric/Behavioral:  The patient is not nervous/anxious and does not have insomnia.    All other systems reviewed and are negative.    Past Medical History   has a past medical history of Bipolar 2 disorder (HCC) (2/23/2012) and Psoriasis.    Surgical History   has no past surgical history on file.     Family History  family history includes Alcohol/Drug in his father; Other in his mother.   Family history reviewed with patient. There is no family history that is pertinent to the chief complaint.     Social History   reports that he has quit smoking. His smoking use included cigarettes. He has never used smokeless tobacco. He reports current drug use. Drug: Inhaled. He reports that he does not drink alcohol.    Allergies  No Known Allergies    Medications  Prior to Admission Medications   Prescriptions Last Dose Informant Patient Reported? Taking?   amoxicillin-clavulanate (AUGMENTIN) 875-125 MG Tab   No No   Sig: Take 1 Tablet by mouth 2 times a day for 5 days.   risperiDONE (RISPERDAL) 2 MG Tab  Patient Yes No   Sig: Take 2 mg by mouth 2 times a day. At 0900 and 2100      Facility-Administered Medications: None       Physical Exam  Temp:  [37 °C (98.6 °F)] 37 °C (98.6 °F)  Pulse:  [72-83] 83  Resp:  [18] 18  BP: ()/(52-67) 111/52  SpO2:  [88 %-99 %] 88 %  Blood Pressure: 111/52   Temperature: 37 °C (98.6 °F)   Pulse: 83    Respiration: 18   Pulse Oximetry: 88 %       Physical Exam  Vitals and nursing note reviewed.   Constitutional:       Comments: Thin, unkempt, older appearing.   HENT:      Head: Normocephalic and atraumatic.      Right Ear: External ear normal.      Left Ear: External ear normal.      Nose: Nose normal.      Mouth/Throat:      Mouth: Mucous membranes are moist.   Eyes:      General: No scleral icterus.     Conjunctiva/sclera: Conjunctivae normal.   Cardiovascular:      Rate and Rhythm: Normal rate and regular rhythm.      Heart sounds: No murmur heard.    No friction rub. No gallop.   Pulmonary:      Effort: Pulmonary effort is normal.      Breath sounds: Normal breath sounds.   Abdominal:      General: Abdomen is flat. Bowel sounds are normal. There is no distension.      Palpations: Abdomen is soft.      Tenderness: There is no abdominal tenderness. There is no guarding.   Musculoskeletal:         General: Normal range of motion.      Cervical back: Normal range of motion and neck supple.   Skin:     General: Skin is warm.      Findings: Lesion and rash present.      Comments: Bilateral lower extremity wounds and dressings   Neurological:      Mental Status: He is alert and oriented to person, place, and time. Mental status is at baseline.   Psychiatric:         Mood and Affect: Mood normal.         Behavior: Behavior normal.         Thought Content: Thought content normal.         Judgment: Judgment normal.       Laboratory:  Recent Labs     01/26/23  1421 01/28/23  1405   WBC 12.9* 8.0   RBC 4.30* 3.88*   HEMOGLOBIN 12.3* 11.1*   HEMATOCRIT 37.1* 33.5*   MCV 86.3 86.3   MCH 28.6 28.6   MCHC 33.2* 33.1*   RDW 39.3 39.3   PLATELETCT 289 310   MPV 8.9* 8.2*     Recent Labs     01/26/23  1421 01/28/23  1405   SODIUM 132* 135   POTASSIUM 4.4 3.8   CHLORIDE 98 100   CO2 23 23   GLUCOSE 112* 90   BUN 6* 10   CREATININE 0.59 0.80   CALCIUM 9.0 8.7     Recent Labs     01/26/23  1421 01/28/23  1405   ALTSGPT 10 8    ASTSGOT 17 11*   ALKPHOSPHAT 93 86   TBILIRUBIN 0.4 <0.2   GLUCOSE 112* 90         No results for input(s): NTPROBNP in the last 72 hours.      No results for input(s): TROPONINT in the last 72 hours.    Imaging:  No orders to display         Assessment/Plan:    * Wound infection, lactic acidosis, meth use  Assessment & Plan  Admit to OBSERVATION, medical floor  Antibiotics  Wound Care consulted    Methamphetamine use (HCC)  Assessment & Plan  Advise no more emthamphetamines    Lactic acidosis  Assessment & Plan  Probably due to low tissue perfusion, doubt sepsis (no other criteria)  IV fluids and boluses. Trend lactic acid    Homeless  Assessment & Plan  Return to shelter when ready for d/c        VTE prophylaxis: Xarelto 10 mg daily as prophylaxis

## 2023-01-30 ENCOUNTER — PHARMACY VISIT (OUTPATIENT)
Dept: PHARMACY | Facility: MEDICAL CENTER | Age: 53
End: 2023-01-30
Payer: MEDICARE

## 2023-01-30 VITALS
RESPIRATION RATE: 16 BRPM | OXYGEN SATURATION: 100 % | SYSTOLIC BLOOD PRESSURE: 116 MMHG | WEIGHT: 128.75 LBS | TEMPERATURE: 98.1 F | DIASTOLIC BLOOD PRESSURE: 72 MMHG | BODY MASS INDEX: 19.51 KG/M2 | HEIGHT: 68 IN | HEART RATE: 51 BPM

## 2023-01-30 PROBLEM — T14.8XXA WOUND INFECTION: Status: RESOLVED | Noted: 2023-01-28 | Resolved: 2023-01-30

## 2023-01-30 PROBLEM — L08.9 WOUND INFECTION: Status: RESOLVED | Noted: 2023-01-28 | Resolved: 2023-01-30

## 2023-01-30 LAB
ANION GAP SERPL CALC-SCNC: 8 MMOL/L (ref 7–16)
BUN SERPL-MCNC: 8 MG/DL (ref 8–22)
CALCIUM SERPL-MCNC: 8.7 MG/DL (ref 8.5–10.5)
CHLORIDE SERPL-SCNC: 103 MMOL/L (ref 96–112)
CO2 SERPL-SCNC: 27 MMOL/L (ref 20–33)
CREAT SERPL-MCNC: 0.73 MG/DL (ref 0.5–1.4)
ERYTHROCYTE [DISTWIDTH] IN BLOOD BY AUTOMATED COUNT: 38.9 FL (ref 35.9–50)
GFR SERPLBLD CREATININE-BSD FMLA CKD-EPI: 108 ML/MIN/1.73 M 2
GLUCOSE SERPL-MCNC: 94 MG/DL (ref 65–99)
HCT VFR BLD AUTO: 32.3 % (ref 42–52)
HGB BLD-MCNC: 10.5 G/DL (ref 14–18)
MCH RBC QN AUTO: 27.9 PG (ref 27–33)
MCHC RBC AUTO-ENTMCNC: 32.5 G/DL (ref 33.7–35.3)
MCV RBC AUTO: 85.7 FL (ref 81.4–97.8)
PLATELET # BLD AUTO: 299 K/UL (ref 164–446)
PMV BLD AUTO: 8 FL (ref 9–12.9)
POTASSIUM SERPL-SCNC: 4 MMOL/L (ref 3.6–5.5)
RBC # BLD AUTO: 3.77 M/UL (ref 4.7–6.1)
SODIUM SERPL-SCNC: 138 MMOL/L (ref 135–145)
WBC # BLD AUTO: 6.1 K/UL (ref 4.8–10.8)

## 2023-01-30 PROCEDURE — 97602 WOUND(S) CARE NON-SELECTIVE: CPT

## 2023-01-30 PROCEDURE — 80048 BASIC METABOLIC PNL TOTAL CA: CPT

## 2023-01-30 PROCEDURE — RXMED WILLOW AMBULATORY MEDICATION CHARGE: Performed by: NURSE PRACTITIONER

## 2023-01-30 PROCEDURE — 85027 COMPLETE CBC AUTOMATED: CPT

## 2023-01-30 PROCEDURE — 99239 HOSP IP/OBS DSCHRG MGMT >30: CPT | Mod: FS | Performed by: NURSE PRACTITIONER

## 2023-01-30 PROCEDURE — 700111 HCHG RX REV CODE 636 W/ 250 OVERRIDE (IP): Performed by: INTERNAL MEDICINE

## 2023-01-30 PROCEDURE — 700105 HCHG RX REV CODE 258: Performed by: INTERNAL MEDICINE

## 2023-01-30 PROCEDURE — 96366 THER/PROPH/DIAG IV INF ADDON: CPT

## 2023-01-30 PROCEDURE — G0378 HOSPITAL OBSERVATION PER HR: HCPCS

## 2023-01-30 RX ORDER — MINERAL OIL/HYDROPHIL PETROLAT
OINTMENT (GRAM) TOPICAL DAILY
Status: DISCONTINUED | OUTPATIENT
Start: 2023-01-30 | End: 2023-01-30 | Stop reason: HOSPADM

## 2023-01-30 RX ORDER — CEPHALEXIN 500 MG/1
500 CAPSULE ORAL 4 TIMES DAILY
Qty: 20 CAPSULE | Refills: 0 | Status: SHIPPED | OUTPATIENT
Start: 2023-01-30 | End: 2023-02-04

## 2023-01-30 RX ORDER — AMOXICILLIN AND CLAVULANATE POTASSIUM 875; 125 MG/1; MG/1
1 TABLET, FILM COATED ORAL 2 TIMES DAILY
Qty: 10 TABLET | Refills: 0 | Status: SHIPPED | OUTPATIENT
Start: 2023-01-30 | End: 2023-01-30

## 2023-01-30 RX ADMIN — AMPICILLIN AND SULBACTAM 3 G: 1; 2 INJECTION, POWDER, FOR SOLUTION INTRAMUSCULAR; INTRAVENOUS at 00:35

## 2023-01-30 RX ADMIN — AMPICILLIN AND SULBACTAM 3 G: 1; 2 INJECTION, POWDER, FOR SOLUTION INTRAMUSCULAR; INTRAVENOUS at 06:22

## 2023-01-30 ASSESSMENT — PAIN DESCRIPTION - PAIN TYPE: TYPE: ACUTE PAIN

## 2023-01-30 NOTE — WOUND TEAM
[facility]  Inpatient Services  Wound and Skin Care Brief Evaluation    Admission Date: 1/28/2023     Last order of IP CONSULT TO WOUND CARE was found on 1/28/2023 from Hospital Encounter on 1/28/2023     HPI, PMH, SH: Reviewed    Chief Complaint   Patient presents with    Sent by MD     Patient left AMA on 1/26 due to concern over losing bed in shelter, came back today to be admitted. Patient has been taking antibiotics as prescribed since leaving the ER. Patient has multiple wounds to his bilateral legs x one month. Right leg is worse than left, wounds are mainly concentrated in the joints, such as the knees and ankles. -trauma. Patient uses methamphetamine, reports last use was 3 days ago.      Diagnosis: Wound infection [T14.8XXA, L08.9]    Unit where seen by Wound Team: T214/00     Wound consult placed regarding BLE. Chart and images reviewed. This discussed with bedside RN. This RN in to assess patient. Pt pleasant and agreeable. Bilateral lower extremity dressings removed.  Non-selectively debrided with moist warm washcloth and no rinse foam soap. Pt has what appears to be psoriasis to BLE. Several dry plaques were removed to reveal intact tissue. Aquaphor applied bilaterally and left open to air. Recommend pt continuing with a moisturizing cream daily. No pressure injuries or advanced wound care needs identified. Wound consult completed. No further follow up unless indicated and consulted.

## 2023-01-30 NOTE — PROGRESS NOTES
Received bedside report from DEEDEE Aguilera. Patient in bed locked in lowest position with call light in reach. POC discussed. All questions answered.

## 2023-01-30 NOTE — PROGRESS NOTES
Discharge instructions reviewed and signed, all questions answered.  Meds to Beds verified and delivered.  Wound care supplies provided by floor RN.  Awaiting MTM to be scheduled.

## 2023-01-30 NOTE — CARE PLAN
The patient is Watcher - Medium risk of patient condition declining or worsening    Shift Goals  Clinical Goals: switch abx to PO, discharge  Patient Goals: rest  Family Goals: KYLEIGH    Progress made toward(s) clinical / shift goals:  progressing    Patient is not progressing towards the following goals:

## 2023-01-30 NOTE — PROGRESS NOTES
Seen by wound care team. Rounded on by MD. Will DC to shelter. Reminded to not scratch at skin. Given medical supplies for next week. IV DC'd with cath intact. Will follow up outpatient.

## 2023-01-30 NOTE — DISCHARGE SUMMARY
Discharge Summary    CHIEF COMPLAINT ON ADMISSION  Chief Complaint   Patient presents with    Sent by MD     Patient left AMA on 1/26 due to concern over losing bed in shelter, came back today to be admitted. Patient has been taking antibiotics as prescribed since leaving the ER. Patient has multiple wounds to his bilateral legs x one month. Right leg is worse than left, wounds are mainly concentrated in the joints, such as the knees and ankles. -trauma. Patient uses methamphetamine, reports last use was 3 days ago.        Reason for Admission  leg pain      Admission Date  1/28/2023    CODE STATUS  Full Code    HPI & HOSPITAL COURSE    Dio Ramirez is a 52 y.o. male who presented 1/28/2023 with Sent by MD (Patient left AMA on 1/26 due to concern over losing bed in shelter, came back today to be admitted. Patient has been taking antibiotics as prescribed since leaving the ER. Patient has multiple wounds to his bilateral legs x one month. Right leg is worse than left, wounds are mainly concentrated in the joints, such as the knees and ankles. -trauma. Patient uses methamphetamine, reports last use was 3 days ago. )    He is a poor historian and odd behavior. He is homeless and admits to smoking weed, methamphetamine use. Denies smoking or alcohol. He has had wounds of his lower extremities for a month, was at the ED a few days ago but left AMA with PO antibiotics due to fear of losing his shelter bed.     He returns because the wounds have worsened but he could not provide any further details.    At the ED, he is afebrile, normotensive.  No leukocytosis. Lactic acidosis.    Patient was admitted for management of his bilateral leg wounds.  Started on Unasyn, no leukocytosis during hospitalization, will be discharged with additional 5 days of Keflex.  He did have an initial elevated lactic acidosis which improved with IV fluids.  Wound care was consulted and recommended patient keep dressings off and apply  cream daily.  Patient also encouraged to stop methamphetamine use.  Is discharged back to the homeless shelter.    I have performed a physical exam and reviewed and updated ROS and Plan today (1/30/2023). In review of yesterday's note (1/29/2023), there are no changes except as documented above.      Therefore, he is discharged in fair and stable condition to home with close outpatient follow-up.    The patient recovered much more quickly than anticipated on admission.    Discharge Date  1/30/2023    FOLLOW UP ITEMS POST DISCHARGE  Complete additional 5 days of antibiotics    DISCHARGE DIAGNOSES  Principal Problem (Resolved):    Wound infection, lactic acidosis, meth use POA: Unknown  Active Problems:    Homeless POA: Unknown    Lactic acidosis POA: Unknown    Methamphetamine use (HCC) POA: Unknown      FOLLOW UP  No future appointments.  [address] [address]  [phone]  Go in 1 week(s)  For wound re-check      MEDICATIONS ON DISCHARGE     Medication List        START taking these medications        Instructions   cephALEXin 500 MG Caps  Commonly known as: KEFLEX   Take 1 Capsule by mouth 4 times a day for 5 days.  Dose: 500 mg            CONTINUE taking these medications        Instructions   risperiDONE 2 MG Tabs  Commonly known as: RISPERDAL   Take 2 mg by mouth 2 times a day. At 0900 and 2100  Dose: 2 mg            STOP taking these medications      amoxicillin-clavulanate 875-125 MG Tabs  Commonly known as: AUGMENTIN              Allergies  No Known Allergies    DIET  Orders Placed This Encounter   Procedures    Diet Order Diet: Regular     Standing Status:   Standing     Number of Occurrences:   1     Order Specific Question:   Diet:     Answer:   Regular [1]       ACTIVITY  As tolerated.  Weight bearing as tolerated    CONSULTATIONS  Wound Care    PROCEDURES  None    LABORATORY  Lab Results   Component Value Date    SODIUM 138 01/30/2023    POTASSIUM 4.0 01/30/2023     CHLORIDE 103 01/30/2023    CO2 27 01/30/2023    GLUCOSE 94 01/30/2023    BUN 8 01/30/2023    CREATININE 0.73 01/30/2023        Lab Results   Component Value Date    WBC 6.1 01/30/2023    HEMOGLOBIN 10.5 (L) 01/30/2023    HEMATOCRIT 32.3 (L) 01/30/2023    PLATELETCT 299 01/30/2023      No orders to display      Total time of the discharge process took 35 minutes.

## 2023-01-30 NOTE — DISCHARGE INSTRUCTIONS
Cleanse legs with clean cloth, warm water, and soap. Apply Aquaphor liberally and cover with roll gauze.

## 2023-01-30 NOTE — CARE PLAN
The patient is Stable - Low risk of patient condition declining or worsening    Shift Goals  Clinical Goals: Transition to PO Abx, DC in AM  Patient Goals: rest  Family Goals: KYLEIGH    Progress made toward(s) clinical / shift goals:    Problem: Pain - Standard  Goal: Alleviation of pain or a reduction in pain to the patient’s comfort goal  Outcome: Progressing  Patient assessed, medicated per MAR and MD orders. Reassessed frequently.      Problem: Knowledge Deficit - Standard  Goal: Patient and family/care givers will demonstrate understanding of plan of care, disease process/condition, diagnostic tests and medications  Outcome: Progressing  Discussed POC. Patient verbalized understanding. All questions answered.

## 2023-01-31 LAB
BACTERIA BLD CULT: NORMAL
BACTERIA BLD CULT: NORMAL
SIGNIFICANT IND 70042: NORMAL
SIGNIFICANT IND 70042: NORMAL
SITE SITE: NORMAL
SITE SITE: NORMAL
SOURCE SOURCE: NORMAL
SOURCE SOURCE: NORMAL

## 2023-03-22 ENCOUNTER — HOSPITAL ENCOUNTER (EMERGENCY)
Facility: MEDICAL CENTER | Age: 53
End: 2023-03-22
Attending: EMERGENCY MEDICINE
Payer: COMMERCIAL

## 2023-03-22 VITALS
HEART RATE: 79 BPM | SYSTOLIC BLOOD PRESSURE: 119 MMHG | DIASTOLIC BLOOD PRESSURE: 75 MMHG | OXYGEN SATURATION: 99 % | WEIGHT: 128 LBS | HEIGHT: 68 IN | TEMPERATURE: 98.9 F | RESPIRATION RATE: 18 BRPM | BODY MASS INDEX: 19.4 KG/M2

## 2023-03-22 DIAGNOSIS — S81.809A MULTIPLE OPENS WOUND OF LOWER EXTREMITY, UNSPECIFIED LATERALITY, INITIAL ENCOUNTER: ICD-10-CM

## 2023-03-22 PROCEDURE — 700102 HCHG RX REV CODE 250 W/ 637 OVERRIDE(OP): Performed by: EMERGENCY MEDICINE

## 2023-03-22 PROCEDURE — 99283 EMERGENCY DEPT VISIT LOW MDM: CPT

## 2023-03-22 PROCEDURE — A9270 NON-COVERED ITEM OR SERVICE: HCPCS | Performed by: EMERGENCY MEDICINE

## 2023-03-22 RX ORDER — CETIRIZINE HYDROCHLORIDE 10 MG/1
10 TABLET ORAL 2 TIMES DAILY
Qty: 60 TABLET | Refills: 0 | Status: SHIPPED | OUTPATIENT
Start: 2023-03-22 | End: 2023-03-25 | Stop reason: SDUPTHER

## 2023-03-22 RX ORDER — CEPHALEXIN 500 MG/1
500 CAPSULE ORAL 3 TIMES DAILY
Qty: 21 CAPSULE | Refills: 0 | Status: SHIPPED | OUTPATIENT
Start: 2023-03-22 | End: 2023-03-22 | Stop reason: SDUPTHER

## 2023-03-22 RX ORDER — CEPHALEXIN 500 MG/1
500 CAPSULE ORAL ONCE
Status: COMPLETED | OUTPATIENT
Start: 2023-03-22 | End: 2023-03-22

## 2023-03-22 RX ORDER — CETIRIZINE HYDROCHLORIDE 10 MG/1
10 TABLET ORAL ONCE
Status: COMPLETED | OUTPATIENT
Start: 2023-03-22 | End: 2023-03-22

## 2023-03-22 RX ORDER — CEPHALEXIN 500 MG/1
500 CAPSULE ORAL 4 TIMES DAILY
Qty: 40 CAPSULE | Refills: 0 | Status: ACTIVE | OUTPATIENT
Start: 2023-03-22 | End: 2023-04-01

## 2023-03-22 RX ADMIN — CETIRIZINE HYDROCHLORIDE 10 MG: 10 TABLET, FILM COATED ORAL at 17:16

## 2023-03-22 RX ADMIN — CEPHALEXIN 500 MG: 500 CAPSULE ORAL at 17:16

## 2023-03-22 ASSESSMENT — FIBROSIS 4 INDEX: FIB4 SCORE: 0.7

## 2023-03-22 NOTE — ED TRIAGE NOTES
"Chief Complaint   Patient presents with    Wound Check     Scabs and redness noted to bilateral legs      Pt ambulatory to triage for complaints of \"infected wounds\" on bilateral legs. Scabs and redness noted. Pt states this is a chronic issue but isn't usually this bad.     Pt is alert and oriented, speaking in full sentences, follows commands and responds appropriately to questions.      Pt placed in lobby. Pt educated on triage process and apologized for wait time. Pt encouraged to alert staff for any changes.     Patient and staff wearing appropriate PPE      /72   Pulse 84   Temp 37.3 °C (99.2 °F) (Temporal)   Resp 18   Ht 1.727 m (5' 8\")   Wt 58.1 kg (128 lb)   SpO2 99%       "

## 2023-03-22 NOTE — ED PROVIDER NOTES
"ED Provider Note    Primary care provider: Pcp Pt States None    CHIEF COMPLAINT  Chief Complaint   Patient presents with    Wound Check     Scabs and redness noted to bilateral legs    HPI  Dio Ramirez is a 53 y.o. male who presents to the Emergency Department a pruritic rash on bilateral lower extremities.  The patient states the rash comes and goes, does not note any specific modifying factors though he has difficulty resisting the urge to scratch.  He does use methamphetamines.  Denies any fevers or chills, denies any allergies.  Denies any history of psoriasis.  Denies any rash as a child.    REVIEW OF SYSTEMS  See HPI.     PAST MEDICAL HISTORY   has a past medical history of Bipolar 2 disorder (HCC) (2/23/2012) and Psoriasis.    SURGICAL HISTORY  patient denies any surgical history    SOCIAL HISTORY  Social History     Tobacco Use    Smoking status: Former     Types: Cigarettes    Smokeless tobacco: Never   Vaping Use    Vaping Use: Never used   Substance Use Topics    Alcohol use: No    Drug use: Yes     Types: Inhaled     Comment: Meth use regulary, some opioid use, marijuana      Social History     Substance and Sexual Activity   Drug Use Yes    Types: Inhaled    Comment: Meth use regulary, some opioid use, marijuana       FAMILY HISTORY  Family History   Problem Relation Age of Onset    Other Mother         epilepsy    Alcohol/Drug Father        CURRENT MEDICATIONS  Reviewed.  See Encounter Summary.     ALLERGIES  No Known Allergies    PHYSICAL EXAM  VITAL SIGNS: /75   Pulse 79   Temp 37.2 °C (98.9 °F) (Temporal)   Resp 18   Ht 1.727 m (5' 8\")   Wt 58.1 kg (128 lb)   SpO2 99%   BMI 19.46 kg/m²   Constitutional: Awake, alert in no apparent distress.  HENT: Normocephalic, Bilateral external ears normal. Nose normal.   Eyes: Conjunctiva normal, non-icteric, EOMI.    Thorax & Lungs: Easy unlabored respirations, Clear to ascultation bilaterally.  Cardiovascular: Regular rate, Regular " rhythm, No murmurs, rubs or gallops. Bilateral pulses symmetrical.   Abdomen:  Soft, nontender, nondistended, normal active bowel sounds.   :    Skin: Asymmetric patches and scaling of the bilateral lower extremities, see photograph below.  Musculoskeletal:   No cyanosis, clubbing or edema. No leg asymmetry.   Neurologic: Alert, Grossly non-focal.   Psychiatric: Normal affect, Normal mood  Lymphatic:            RADIOLOGY  No orders to display         COURSE & MEDICAL DECISION MAKING  Pertinent Labs & Imaging studies reviewed. (See chart for details)    Differential diagnoses include but are not limited to: Psoriasis, compulsory thickening, cellulitis    4:49 PM - Nursing notes reviewed, patient seen and examined at bedside.  Escalation of care considered, and ultimately not performed: blood analysis and acute inpatient care management, however at this time, the patient is most appropriate for outpatient management.    Barriers to care at this time, including but not limited to: Patient is homeless.     Decision Making:  This is a pleasant 53 y.o. year old male who presents with pruritic rash to the bilateral lower extremities.  See photograph below.  I do see that he has a diagnosis of psoriasis on his past medical history but he denies this.  Some of the patches/plaques do have the appearance of psoriatic disease.  In addition the patient uses methamphetamines which causes compulsory skin picking which can lead to a similar appearing rash.    At this point he does not clearly septic, there may be some aspect of cellulitis but overall I feel that the skin looks quite well.  I think this could be treated with an outpatient course of Keflex, when the climate warms up I would recommend some phototherapy as well as this will typically clear up any mild psoriasis.    The most important thing is going to keep him from scratching the lesions.  I will prescribe twice daily cetirizine which should help in that  regard.        Discharge Medications:  Discharge Medication List as of 3/22/2023  5:20 PM        START taking these medications    Details   cetirizine (ZYRTEC) 10 MG Tab Take 1 Tablet by mouth 2 times a day for 30 days., Disp-60 Tablet, R-0, Normal             The patient was discharged home (see d/c instructions) was told to return immediately for any signs or symptoms listed, or any worsening at all.  The patient verbally agreed to the discharge precautions and follow-up plan which is documented in EPIC.        FINAL IMPRESSION  1. Multiple opens wound of lower extremity, unspecified laterality, initial encounter

## 2023-03-23 ENCOUNTER — HOSPITAL ENCOUNTER (EMERGENCY)
Facility: MEDICAL CENTER | Age: 53
End: 2023-03-23
Payer: COMMERCIAL

## 2023-03-23 VITALS
TEMPERATURE: 97 F | HEIGHT: 68 IN | HEART RATE: 74 BPM | BODY MASS INDEX: 19.65 KG/M2 | DIASTOLIC BLOOD PRESSURE: 75 MMHG | SYSTOLIC BLOOD PRESSURE: 101 MMHG | WEIGHT: 129.63 LBS | OXYGEN SATURATION: 98 % | RESPIRATION RATE: 19 BRPM

## 2023-03-23 PROCEDURE — 302449 STATCHG TRIAGE ONLY (STATISTIC)

## 2023-03-23 ASSESSMENT — FIBROSIS 4 INDEX: FIB4 SCORE: 0.7

## 2023-03-23 NOTE — ED NOTES
Patient discharged per order. Oral and written discharge instructions reviewed. Medications sent to home pharmacy. New medications reviewed. All belongings accounted for and taken with patient. Questions answered, and patient agrees with discharge plan. Patient given warm meal before discharge. Encouraged to follow up with PCP.

## 2023-03-23 NOTE — ED TRIAGE NOTES
"Ambulatory to triage with report of needing to get Rx clarified from visit yesterday.  Reports pharmacy will not fill Rx due to 90 day prescription and insurance will only pay of 30 day.  Upon reviewing AVS, Rx for 30 day was called to pharmacy.  Pt states \"I'm not playing this game, I'm leaving\".  Advised pt to show AVS to pharmacy and have pharmacy call to clarify if problem continues.  Verbalizes understanding.  Ambulated out.  "

## 2023-03-25 RX ORDER — CETIRIZINE HYDROCHLORIDE 10 MG/1
10 TABLET ORAL DAILY
Qty: 30 TABLET | Refills: 0 | Status: SHIPPED | OUTPATIENT
Start: 2023-03-25 | End: 2023-04-24

## 2023-03-25 NOTE — DISCHARGE PLANNING
Pt in lobby stating his insurance won't cover 60 tabs of zyrtec, only 30. CM spoke with Dr Garza who changed Rx.

## 2023-08-23 ENCOUNTER — HOSPITAL ENCOUNTER (EMERGENCY)
Facility: MEDICAL CENTER | Age: 53
End: 2023-08-23
Attending: EMERGENCY MEDICINE
Payer: COMMERCIAL

## 2023-08-23 VITALS
TEMPERATURE: 97 F | DIASTOLIC BLOOD PRESSURE: 72 MMHG | OXYGEN SATURATION: 95 % | HEART RATE: 96 BPM | BODY MASS INDEX: 19.21 KG/M2 | SYSTOLIC BLOOD PRESSURE: 117 MMHG | RESPIRATION RATE: 20 BRPM | WEIGHT: 126.76 LBS | HEIGHT: 68 IN

## 2023-08-23 DIAGNOSIS — T07.XXXA ABRASIONS OF MULTIPLE SITES: ICD-10-CM

## 2023-08-23 LAB
C TRACH DNA SPEC QL NAA+PROBE: NEGATIVE
HIV 1+2 AB+HIV1 P24 AG SERPL QL IA: NORMAL
N GONORRHOEA DNA SPEC QL NAA+PROBE: NEGATIVE
SPECIMEN SOURCE: NORMAL
T PALLIDUM AB SER QL IA: NORMAL

## 2023-08-23 PROCEDURE — 36415 COLL VENOUS BLD VENIPUNCTURE: CPT

## 2023-08-23 PROCEDURE — 86780 TREPONEMA PALLIDUM: CPT

## 2023-08-23 PROCEDURE — 87389 HIV-1 AG W/HIV-1&-2 AB AG IA: CPT

## 2023-08-23 PROCEDURE — 87491 CHLMYD TRACH DNA AMP PROBE: CPT

## 2023-08-23 PROCEDURE — 87591 N.GONORRHOEAE DNA AMP PROB: CPT

## 2023-08-23 PROCEDURE — 99283 EMERGENCY DEPT VISIT LOW MDM: CPT

## 2023-08-23 ASSESSMENT — FIBROSIS 4 INDEX: FIB4 SCORE: 0.7

## 2023-08-23 NOTE — ED TRIAGE NOTES
Chief Complaint   Patient presents with    Other     Pt states he would like to be tested for syphilis. Pt states he was trying to give plasma and was told that he tested positive. States he has not had intercourse in 3 years. States this happened once before when he had a leg infection. Pt has superficial wounds/scratches to bilateral lower leg.       Pt ambulates to triage for above.     Protocol ordered, pt wound like to be tested for everything. Urine cup provided for sample.     Pt to lobby, educated on triage process, encouraged to notify staff of changes in condition.

## 2023-08-23 NOTE — ED PROVIDER NOTES
" ED PHYSICIAN NOTE    CHIEF COMPLAINT  Chief Complaint   Patient presents with    Other     Pt states he would like to be tested for syphilis. Pt states he was trying to give plasma and was told that he tested positive. States he has not had intercourse in 3 years. States this happened once before when he had a leg infection. Pt has superficial wounds/scratches to bilateral lower leg.         EXTERNAL RECORDS REVIEWED  Outpatient labs & studies patient was tested for syphilis in September 22.  Test was negative    HPI/ROS    Dio Ramirez is a 53 y.o. male who presents to the emergency department because of a positive syphilis test while trying to donate plasma.  He states he has not had intercourse in about 3 years.  No known exposure to syphilis.  He has no symptoms.  He does have some scratches on his lower legs.  He reports his legs have been very itchy therefore scratches them.  He has not had fever or expanding redness.  No palms or soles.    PAST MEDICAL HISTORY  Past Medical History:   Diagnosis Date    Bipolar 2 disorder (HCC) 2/23/2012    Psoriasis        SOCIAL HISTORY  Social History     Tobacco Use    Smoking status: Former     Types: Cigarettes    Smokeless tobacco: Never   Vaping Use    Vaping Use: Never used   Substance Use Topics    Alcohol use: No    Drug use: Yes     Types: Inhaled     Comment: Meth use regulary, some opioid use, marijuana       CURRENT MEDICATIONS  Home Medications       Reviewed by Michelle Monroy R.N. (Registered Nurse) on 08/23/23 at 0452  Med List Status: Partial     Medication Last Dose Status   risperiDONE (RISPERDAL) 2 MG Tab  Active                    ALLERGIES  No Known Allergies    PHYSICAL EXAM  VITAL SIGNS: /74   Pulse 97   Temp 36.3 °C (97.3 °F) (Temporal)   Resp 18   Ht 1.727 m (5' 8\")   Wt 57.5 kg (126 lb 12.2 oz)   SpO2 96%   BMI 19.27 kg/m²    Constitutional: Awake and alert  HENT: Normal inspection  Eyes: Normal inspection  Neck: Grossly " normal range of motion.  Cardiovascular: Normal heart rate  Thorax & Lungs: No respiratory distress  Skin: Corrugated lesions over the bilateral anterior lower extremities.  No cellulitis  Back: No tenderness, No CVA tenderness.   Extremities: No clubbing, cyanosis, edema, no Homans or cords.  Neurologic: Grossly normal   Psychiatric: Normal for situation     DIAGNOSTIC STUDIES / PROCEDURES  LABS/EKG  Results for orders placed or performed during the hospital encounter of 08/23/23   T.PALLIDUM AB CANDELARIA (Syphilis)   Result Value Ref Range    Syphilis, Treponemal Qual Non-Reactive Non-Reactive   HIV AG/AB Combo Assay Screening   Result Value Ref Range    HIV Ag/Ab Combo Assay Non-Reactive Non Reactive         COURSE & MEDICAL DECISION MAKING      INITIAL ASSESSMENT, COURSE AND PLAN  Care Narrative: Patient presents to rule out syphilis.  Syphilis and HIV screening was sent.  He has excoriations of bilateral lower extremities without evidence of cellulitis.  No other medical complaints or findings on examination.    Syphilis and HIV test returned negative.  I discussed this with the patient.  He was reassured.  I have given instructions on wound care.  Advised washing his lower extremities every day with soap and water to prevent infection.  Return to ER for any expanding redness warmth evidence of cellulitis.          ADDITIONAL PROBLEM LIST  Past Medical History:   Diagnosis Date    Bipolar 2 disorder (HCC) 2/23/2012    Psoriasis        DISPOSITION AND DISCUSSIONS      FINAL IMPRESSION  1.  Rule out syphilis  2.  Abrasions bilateral lower extremities  3.  Psoriasis    This dictation was created using voice recognition software. The accuracy of the dictation is limited to the abilities of the software. I expect there may be some errors of grammar and possibly content. The nursing notes were reviewed and certain aspects of this information were incorporated into this note.    Electronically signed by: Antwan Marques,  M.D., 8/23/2023

## 2023-08-23 NOTE — ED NOTES
Pt discharged to home. Pt was given follow up instructions. Pt given print out of negative results. Pt verbalized understanding of all instructions for discharge and is ambulatory out of ED with steady gait. Aox4

## 2023-09-18 ENCOUNTER — APPOINTMENT (OUTPATIENT)
Dept: RADIOLOGY | Facility: MEDICAL CENTER | Age: 53
DRG: 871 | End: 2023-09-18
Attending: EMERGENCY MEDICINE
Payer: COMMERCIAL

## 2023-09-18 ENCOUNTER — HOSPITAL ENCOUNTER (INPATIENT)
Facility: MEDICAL CENTER | Age: 53
LOS: 4 days | DRG: 871 | End: 2023-09-22
Attending: EMERGENCY MEDICINE | Admitting: STUDENT IN AN ORGANIZED HEALTH CARE EDUCATION/TRAINING PROGRAM
Payer: COMMERCIAL

## 2023-09-18 DIAGNOSIS — E03.4 HYPOTHYROIDISM DUE TO ACQUIRED ATROPHY OF THYROID: ICD-10-CM

## 2023-09-18 DIAGNOSIS — F33.2 SEVERE EPISODE OF RECURRENT MAJOR DEPRESSIVE DISORDER, WITHOUT PSYCHOTIC FEATURES (HCC): ICD-10-CM

## 2023-09-18 DIAGNOSIS — J18.9 PNEUMONIA OF RIGHT LOWER LOBE DUE TO INFECTIOUS ORGANISM: ICD-10-CM

## 2023-09-18 DIAGNOSIS — E53.8 VITAMIN B12 DEFICIENCY: ICD-10-CM

## 2023-09-18 DIAGNOSIS — F43.10 PTSD (POST-TRAUMATIC STRESS DISORDER): ICD-10-CM

## 2023-09-18 PROBLEM — A41.9 SEPSIS (HCC): Status: ACTIVE | Noted: 2023-09-18

## 2023-09-18 LAB
ALBUMIN SERPL BCP-MCNC: 3.9 G/DL (ref 3.2–4.9)
ALBUMIN/GLOB SERPL: 1.1 G/DL
ALP SERPL-CCNC: 76 U/L (ref 30–99)
ALT SERPL-CCNC: 26 U/L (ref 2–50)
ANION GAP SERPL CALC-SCNC: 14 MMOL/L (ref 7–16)
ANISOCYTOSIS BLD QL SMEAR: ABNORMAL
APPEARANCE UR: CLEAR
AST SERPL-CCNC: 26 U/L (ref 12–45)
BACTERIA #/AREA URNS HPF: NEGATIVE /HPF
BASOPHILS # BLD AUTO: 0 % (ref 0–1.8)
BASOPHILS # BLD: 0 K/UL (ref 0–0.12)
BILIRUB SERPL-MCNC: 1.4 MG/DL (ref 0.1–1.5)
BILIRUB UR QL STRIP.AUTO: NEGATIVE
BUN SERPL-MCNC: 19 MG/DL (ref 8–22)
BURR CELLS BLD QL SMEAR: NORMAL
CALCIUM ALBUM COR SERPL-MCNC: 9.4 MG/DL (ref 8.5–10.5)
CALCIUM SERPL-MCNC: 9.3 MG/DL (ref 8.5–10.5)
CHLORIDE SERPL-SCNC: 92 MMOL/L (ref 96–112)
CO2 SERPL-SCNC: 21 MMOL/L (ref 20–33)
COLOR UR: YELLOW
CREAT SERPL-MCNC: 1.13 MG/DL (ref 0.5–1.4)
DOHLE BOD BLD QL SMEAR: NORMAL
EKG IMPRESSION: NORMAL
EOSINOPHIL # BLD AUTO: 0 K/UL (ref 0–0.51)
EOSINOPHIL NFR BLD: 0 % (ref 0–6.9)
EPI CELLS #/AREA URNS HPF: NEGATIVE /HPF
ERYTHROCYTE [DISTWIDTH] IN BLOOD BY AUTOMATED COUNT: 41.9 FL (ref 35.9–50)
GFR SERPLBLD CREATININE-BSD FMLA CKD-EPI: 77 ML/MIN/1.73 M 2
GLOBULIN SER CALC-MCNC: 3.4 G/DL (ref 1.9–3.5)
GLUCOSE SERPL-MCNC: 115 MG/DL (ref 65–99)
GLUCOSE UR STRIP.AUTO-MCNC: NEGATIVE MG/DL
HCT VFR BLD AUTO: 45.6 % (ref 42–52)
HGB BLD-MCNC: 15.2 G/DL (ref 14–18)
HYALINE CASTS #/AREA URNS LPF: ABNORMAL /LPF
KETONES UR STRIP.AUTO-MCNC: NEGATIVE MG/DL
LACTATE SERPL-SCNC: 2 MMOL/L (ref 0.5–2)
LACTATE SERPL-SCNC: 2.7 MMOL/L (ref 0.5–2)
LEUKOCYTE ESTERASE UR QL STRIP.AUTO: NEGATIVE
LIPASE SERPL-CCNC: 9 U/L (ref 11–82)
LYMPHOCYTES # BLD AUTO: 1.09 K/UL (ref 1–4.8)
LYMPHOCYTES NFR BLD: 5.2 % (ref 22–41)
MANUAL DIFF BLD: ABNORMAL
MCH RBC QN AUTO: 28.5 PG (ref 27–33)
MCHC RBC AUTO-ENTMCNC: 33.3 G/DL (ref 32.3–36.5)
MCV RBC AUTO: 85.4 FL (ref 81.4–97.8)
METAMYELOCYTES NFR BLD MANUAL: 0.9 %
MICRO URNS: ABNORMAL
MICROCYTES BLD QL SMEAR: ABNORMAL
MONOCYTES # BLD AUTO: 0.36 K/UL (ref 0–0.85)
MONOCYTES NFR BLD AUTO: 1.7 % (ref 0–13.4)
MORPHOLOGY BLD-IMP: NORMAL
NEUTROPHILS # BLD AUTO: 19.27 K/UL (ref 1.82–7.42)
NEUTROPHILS NFR BLD: 78.3 % (ref 44–72)
NEUTS BAND NFR BLD MANUAL: 13.9 % (ref 0–10)
NITRITE UR QL STRIP.AUTO: NEGATIVE
NRBC # BLD AUTO: 0 K/UL
NRBC BLD-RTO: 0 /100 WBC (ref 0–0.2)
PH UR STRIP.AUTO: 5.5 [PH] (ref 5–8)
PLATELET # BLD AUTO: 246 K/UL (ref 164–446)
PLATELET BLD QL SMEAR: NORMAL
PMV BLD AUTO: 9.1 FL (ref 9–12.9)
POIKILOCYTOSIS BLD QL SMEAR: NORMAL
POTASSIUM SERPL-SCNC: 4.2 MMOL/L (ref 3.6–5.5)
PROT SERPL-MCNC: 7.3 G/DL (ref 6–8.2)
PROT UR QL STRIP: NEGATIVE MG/DL
RBC # BLD AUTO: 5.34 M/UL (ref 4.7–6.1)
RBC # URNS HPF: ABNORMAL /HPF
RBC BLD AUTO: PRESENT
RBC UR QL AUTO: ABNORMAL
SODIUM SERPL-SCNC: 127 MMOL/L (ref 135–145)
SP GR UR STRIP.AUTO: 1.03
UROBILINOGEN UR STRIP.AUTO-MCNC: 1 MG/DL
WBC # BLD AUTO: 20.9 K/UL (ref 4.8–10.8)
WBC #/AREA URNS HPF: ABNORMAL /HPF
WBC TOXIC VACUOLES BLD QL SMEAR: NORMAL

## 2023-09-18 PROCEDURE — 770006 HCHG ROOM/CARE - MED/SURG/GYN SEMI*

## 2023-09-18 PROCEDURE — 93005 ELECTROCARDIOGRAM TRACING: CPT

## 2023-09-18 PROCEDURE — 36415 COLL VENOUS BLD VENIPUNCTURE: CPT

## 2023-09-18 PROCEDURE — 99285 EMERGENCY DEPT VISIT HI MDM: CPT

## 2023-09-18 PROCEDURE — 76857 US EXAM PELVIC LIMITED: CPT | Mod: 26 | Performed by: STUDENT IN AN ORGANIZED HEALTH CARE EDUCATION/TRAINING PROGRAM

## 2023-09-18 PROCEDURE — 700117 HCHG RX CONTRAST REV CODE 255: Mod: UD | Performed by: EMERGENCY MEDICINE

## 2023-09-18 PROCEDURE — 96375 TX/PRO/DX INJ NEW DRUG ADDON: CPT

## 2023-09-18 PROCEDURE — A9270 NON-COVERED ITEM OR SERVICE: HCPCS | Performed by: STUDENT IN AN ORGANIZED HEALTH CARE EDUCATION/TRAINING PROGRAM

## 2023-09-18 PROCEDURE — 71275 CT ANGIOGRAPHY CHEST: CPT

## 2023-09-18 PROCEDURE — 83605 ASSAY OF LACTIC ACID: CPT | Mod: 91

## 2023-09-18 PROCEDURE — 700102 HCHG RX REV CODE 250 W/ 637 OVERRIDE(OP): Performed by: STUDENT IN AN ORGANIZED HEALTH CARE EDUCATION/TRAINING PROGRAM

## 2023-09-18 PROCEDURE — 80053 COMPREHEN METABOLIC PANEL: CPT

## 2023-09-18 PROCEDURE — 93005 ELECTROCARDIOGRAM TRACING: CPT | Performed by: EMERGENCY MEDICINE

## 2023-09-18 PROCEDURE — 700105 HCHG RX REV CODE 258: Performed by: STUDENT IN AN ORGANIZED HEALTH CARE EDUCATION/TRAINING PROGRAM

## 2023-09-18 PROCEDURE — 85025 COMPLETE CBC W/AUTO DIFF WBC: CPT

## 2023-09-18 PROCEDURE — 700105 HCHG RX REV CODE 258: Mod: UD | Performed by: EMERGENCY MEDICINE

## 2023-09-18 PROCEDURE — 71101 X-RAY EXAM UNILAT RIBS/CHEST: CPT | Mod: RT

## 2023-09-18 PROCEDURE — 83690 ASSAY OF LIPASE: CPT

## 2023-09-18 PROCEDURE — 87040 BLOOD CULTURE FOR BACTERIA: CPT

## 2023-09-18 PROCEDURE — 85007 BL SMEAR W/DIFF WBC COUNT: CPT

## 2023-09-18 PROCEDURE — 96365 THER/PROPH/DIAG IV INF INIT: CPT

## 2023-09-18 PROCEDURE — 81001 URINALYSIS AUTO W/SCOPE: CPT

## 2023-09-18 PROCEDURE — 700111 HCHG RX REV CODE 636 W/ 250 OVERRIDE (IP): Mod: UD | Performed by: EMERGENCY MEDICINE

## 2023-09-18 PROCEDURE — 99223 1ST HOSP IP/OBS HIGH 75: CPT | Performed by: STUDENT IN AN ORGANIZED HEALTH CARE EDUCATION/TRAINING PROGRAM

## 2023-09-18 PROCEDURE — 700101 HCHG RX REV CODE 250: Mod: UD | Performed by: EMERGENCY MEDICINE

## 2023-09-18 RX ORDER — SODIUM CHLORIDE, SODIUM LACTATE, POTASSIUM CHLORIDE, AND CALCIUM CHLORIDE .6; .31; .03; .02 G/100ML; G/100ML; G/100ML; G/100ML
30 INJECTION, SOLUTION INTRAVENOUS ONCE
Status: COMPLETED | OUTPATIENT
Start: 2023-09-18 | End: 2023-09-19

## 2023-09-18 RX ORDER — ACETAMINOPHEN 325 MG/1
650 TABLET ORAL EVERY 6 HOURS PRN
Status: DISCONTINUED | OUTPATIENT
Start: 2023-09-18 | End: 2023-09-22 | Stop reason: HOSPADM

## 2023-09-18 RX ORDER — AZITHROMYCIN 500 MG/5ML
500 INJECTION, POWDER, LYOPHILIZED, FOR SOLUTION INTRAVENOUS ONCE
Status: COMPLETED | OUTPATIENT
Start: 2023-09-18 | End: 2023-09-18

## 2023-09-18 RX ORDER — AZITHROMYCIN 500 MG/5ML
500 INJECTION, POWDER, LYOPHILIZED, FOR SOLUTION INTRAVENOUS EVERY 24 HOURS
Status: DISCONTINUED | OUTPATIENT
Start: 2023-09-19 | End: 2023-09-19

## 2023-09-18 RX ORDER — BISACODYL 10 MG
10 SUPPOSITORY, RECTAL RECTAL
Status: DISCONTINUED | OUTPATIENT
Start: 2023-09-18 | End: 2023-09-22 | Stop reason: HOSPADM

## 2023-09-18 RX ORDER — AMOXICILLIN 250 MG
2 CAPSULE ORAL 2 TIMES DAILY
Status: DISCONTINUED | OUTPATIENT
Start: 2023-09-19 | End: 2023-09-22 | Stop reason: HOSPADM

## 2023-09-18 RX ORDER — ENOXAPARIN SODIUM 100 MG/ML
40 INJECTION SUBCUTANEOUS DAILY
Status: DISCONTINUED | OUTPATIENT
Start: 2023-09-19 | End: 2023-09-22 | Stop reason: HOSPADM

## 2023-09-18 RX ORDER — POLYETHYLENE GLYCOL 3350 17 G/17G
1 POWDER, FOR SOLUTION ORAL
Status: DISCONTINUED | OUTPATIENT
Start: 2023-09-18 | End: 2023-09-22 | Stop reason: HOSPADM

## 2023-09-18 RX ADMIN — IOHEXOL 100 ML: 350 INJECTION, SOLUTION INTRAVENOUS at 19:31

## 2023-09-18 RX ADMIN — CEFTRIAXONE SODIUM 1000 MG: 10 INJECTION, POWDER, FOR SOLUTION INTRAVENOUS at 19:47

## 2023-09-18 RX ADMIN — AZITHROMYCIN 500 MG: 500 INJECTION, POWDER, LYOPHILIZED, FOR SOLUTION INTRAVENOUS at 20:45

## 2023-09-18 RX ADMIN — ACETAMINOPHEN 650 MG: 325 TABLET, FILM COATED ORAL at 22:55

## 2023-09-18 RX ADMIN — SODIUM CHLORIDE, POTASSIUM CHLORIDE, SODIUM LACTATE AND CALCIUM CHLORIDE 1000 ML: 600; 310; 30; 20 INJECTION, SOLUTION INTRAVENOUS at 22:57

## 2023-09-18 ASSESSMENT — LIFESTYLE VARIABLES
CONSUMPTION TOTAL: NEGATIVE
EVER FELT BAD OR GUILTY ABOUT YOUR DRINKING: NO
TOTAL SCORE: 0
ON A TYPICAL DAY WHEN YOU DRINK ALCOHOL HOW MANY DRINKS DO YOU HAVE: 0
HOW MANY TIMES IN THE PAST YEAR HAVE YOU HAD 5 OR MORE DRINKS IN A DAY: 0
TOTAL SCORE: 0
EVER HAD A DRINK FIRST THING IN THE MORNING TO STEADY YOUR NERVES TO GET RID OF A HANGOVER: NO
AVERAGE NUMBER OF DAYS PER WEEK YOU HAVE A DRINK CONTAINING ALCOHOL: 0
TOTAL SCORE: 0
HAVE PEOPLE ANNOYED YOU BY CRITICIZING YOUR DRINKING: NO
HAVE YOU EVER FELT YOU SHOULD CUT DOWN ON YOUR DRINKING: NO
ALCOHOL_USE: NO

## 2023-09-18 ASSESSMENT — COGNITIVE AND FUNCTIONAL STATUS - GENERAL
DAILY ACTIVITIY SCORE: 24
SUGGESTED CMS G CODE MODIFIER MOBILITY: CH
SUGGESTED CMS G CODE MODIFIER DAILY ACTIVITY: CH
MOBILITY SCORE: 24

## 2023-09-18 ASSESSMENT — PAIN DESCRIPTION - PAIN TYPE: TYPE: ACUTE PAIN

## 2023-09-18 ASSESSMENT — FIBROSIS 4 INDEX
FIB4 SCORE: 1.12
FIB4 SCORE: 0.7

## 2023-09-18 ASSESSMENT — ENCOUNTER SYMPTOMS
SHORTNESS OF BREATH: 1
COUGH: 1
SPUTUM PRODUCTION: 1

## 2023-09-18 ASSESSMENT — PATIENT HEALTH QUESTIONNAIRE - PHQ9
1. LITTLE INTEREST OR PLEASURE IN DOING THINGS: NOT AT ALL
2. FEELING DOWN, DEPRESSED, IRRITABLE, OR HOPELESS: NOT AT ALL
SUM OF ALL RESPONSES TO PHQ9 QUESTIONS 1 AND 2: 0

## 2023-09-18 NOTE — ED TRIAGE NOTES
.  Chief Complaint   Patient presents with    RUQ Pain     Onset last night    Shortness of Breath    N/V     Ambulated to triage with sob. Pt reports woke last night with pain to right side.

## 2023-09-19 PROBLEM — R45.851 SUICIDAL IDEATION: Status: ACTIVE | Noted: 2023-09-19

## 2023-09-19 PROBLEM — U07.1 PNEUMONIA DUE TO COVID-19 VIRUS: Status: ACTIVE | Noted: 2023-09-18

## 2023-09-19 PROBLEM — J12.82 PNEUMONIA DUE TO COVID-19 VIRUS: Status: ACTIVE | Noted: 2023-09-18

## 2023-09-19 LAB
ANION GAP SERPL CALC-SCNC: 8 MMOL/L (ref 7–16)
ANISOCYTOSIS BLD QL SMEAR: ABNORMAL
BASOPHILS # BLD AUTO: 0 % (ref 0–1.8)
BASOPHILS # BLD: 0 K/UL (ref 0–0.12)
BUN SERPL-MCNC: 15 MG/DL (ref 8–22)
CALCIUM SERPL-MCNC: 8.4 MG/DL (ref 8.5–10.5)
CHLORIDE SERPL-SCNC: 102 MMOL/L (ref 96–112)
CO2 SERPL-SCNC: 24 MMOL/L (ref 20–33)
CREAT SERPL-MCNC: 0.68 MG/DL (ref 0.5–1.4)
EOSINOPHIL # BLD AUTO: 0.15 K/UL (ref 0–0.51)
EOSINOPHIL NFR BLD: 0.9 % (ref 0–6.9)
ERYTHROCYTE [DISTWIDTH] IN BLOOD BY AUTOMATED COUNT: 42.2 FL (ref 35.9–50)
GFR SERPLBLD CREATININE-BSD FMLA CKD-EPI: 110 ML/MIN/1.73 M 2
GLUCOSE SERPL-MCNC: 128 MG/DL (ref 65–99)
HCT VFR BLD AUTO: 34.7 % (ref 42–52)
HGB BLD-MCNC: 11.9 G/DL (ref 14–18)
LYMPHOCYTES # BLD AUTO: 0.89 K/UL (ref 1–4.8)
LYMPHOCYTES NFR BLD: 5.3 % (ref 22–41)
MANUAL DIFF BLD: NORMAL
MCH RBC QN AUTO: 29.4 PG (ref 27–33)
MCHC RBC AUTO-ENTMCNC: 34.3 G/DL (ref 32.3–36.5)
MCV RBC AUTO: 85.7 FL (ref 81.4–97.8)
MICROCYTES BLD QL SMEAR: ABNORMAL
MONOCYTES # BLD AUTO: 0.15 K/UL (ref 0–0.85)
MONOCYTES NFR BLD AUTO: 0.9 % (ref 0–13.4)
MORPHOLOGY BLD-IMP: NORMAL
NEUTROPHILS # BLD AUTO: 15.51 K/UL (ref 1.82–7.42)
NEUTROPHILS NFR BLD: 92.9 % (ref 44–72)
NRBC # BLD AUTO: 0 K/UL
NRBC BLD-RTO: 0 /100 WBC (ref 0–0.2)
PLATELET # BLD AUTO: 216 K/UL (ref 164–446)
PLATELET BLD QL SMEAR: NORMAL
PMV BLD AUTO: 9 FL (ref 9–12.9)
POTASSIUM SERPL-SCNC: 3.8 MMOL/L (ref 3.6–5.5)
RBC # BLD AUTO: 4.05 M/UL (ref 4.7–6.1)
RBC BLD AUTO: PRESENT
SARS-COV-2 RNA RESP QL NAA+PROBE: DETECTED
SODIUM SERPL-SCNC: 134 MMOL/L (ref 135–145)
SPECIMEN SOURCE: ABNORMAL
WBC # BLD AUTO: 16.7 K/UL (ref 4.8–10.8)

## 2023-09-19 PROCEDURE — 700101 HCHG RX REV CODE 250: Performed by: STUDENT IN AN ORGANIZED HEALTH CARE EDUCATION/TRAINING PROGRAM

## 2023-09-19 PROCEDURE — 700105 HCHG RX REV CODE 258: Performed by: HOSPITALIST

## 2023-09-19 PROCEDURE — 85025 COMPLETE CBC W/AUTO DIFF WBC: CPT

## 2023-09-19 PROCEDURE — 700105 HCHG RX REV CODE 258: Performed by: STUDENT IN AN ORGANIZED HEALTH CARE EDUCATION/TRAINING PROGRAM

## 2023-09-19 PROCEDURE — 700111 HCHG RX REV CODE 636 W/ 250 OVERRIDE (IP): Mod: JZ | Performed by: STUDENT IN AN ORGANIZED HEALTH CARE EDUCATION/TRAINING PROGRAM

## 2023-09-19 PROCEDURE — 770001 HCHG ROOM/CARE - MED/SURG/GYN PRIV*

## 2023-09-19 PROCEDURE — 97602 WOUND(S) CARE NON-SELECTIVE: CPT

## 2023-09-19 PROCEDURE — 700102 HCHG RX REV CODE 250 W/ 637 OVERRIDE(OP): Performed by: HOSPITALIST

## 2023-09-19 PROCEDURE — A9270 NON-COVERED ITEM OR SERVICE: HCPCS | Performed by: STUDENT IN AN ORGANIZED HEALTH CARE EDUCATION/TRAINING PROGRAM

## 2023-09-19 PROCEDURE — 700102 HCHG RX REV CODE 250 W/ 637 OVERRIDE(OP): Performed by: STUDENT IN AN ORGANIZED HEALTH CARE EDUCATION/TRAINING PROGRAM

## 2023-09-19 PROCEDURE — 85007 BL SMEAR W/DIFF WBC COUNT: CPT

## 2023-09-19 PROCEDURE — 87635 SARS-COV-2 COVID-19 AMP PRB: CPT

## 2023-09-19 PROCEDURE — 36415 COLL VENOUS BLD VENIPUNCTURE: CPT

## 2023-09-19 PROCEDURE — 80048 BASIC METABOLIC PNL TOTAL CA: CPT

## 2023-09-19 PROCEDURE — A9270 NON-COVERED ITEM OR SERVICE: HCPCS | Performed by: HOSPITALIST

## 2023-09-19 PROCEDURE — 99233 SBSQ HOSP IP/OBS HIGH 50: CPT | Performed by: HOSPITALIST

## 2023-09-19 RX ORDER — ASCORBIC ACID 500 MG
500 TABLET ORAL DAILY
Status: DISCONTINUED | OUTPATIENT
Start: 2023-09-20 | End: 2023-09-22 | Stop reason: HOSPADM

## 2023-09-19 RX ORDER — PETROLATUM 420 MG/G
OINTMENT TOPICAL 3 TIMES DAILY
Status: DISCONTINUED | OUTPATIENT
Start: 2023-09-19 | End: 2023-09-22 | Stop reason: HOSPADM

## 2023-09-19 RX ORDER — SODIUM CHLORIDE 9 MG/ML
INJECTION, SOLUTION INTRAVENOUS CONTINUOUS
Status: DISCONTINUED | OUTPATIENT
Start: 2023-09-19 | End: 2023-09-19

## 2023-09-19 RX ORDER — DEXAMETHASONE 4 MG/1
6 TABLET ORAL DAILY
Status: DISCONTINUED | OUTPATIENT
Start: 2023-09-20 | End: 2023-09-19

## 2023-09-19 RX ORDER — AZITHROMYCIN 250 MG/1
500 TABLET, FILM COATED ORAL ONCE
Status: COMPLETED | OUTPATIENT
Start: 2023-09-19 | End: 2023-09-19

## 2023-09-19 RX ADMIN — CEFTRIAXONE SODIUM 1000 MG: 10 INJECTION, POWDER, FOR SOLUTION INTRAVENOUS at 06:21

## 2023-09-19 RX ADMIN — AZITHROMYCIN 500 MG: 250 TABLET, FILM COATED ORAL at 12:53

## 2023-09-19 RX ADMIN — ACETAMINOPHEN 650 MG: 325 TABLET, FILM COATED ORAL at 16:19

## 2023-09-19 RX ADMIN — Medication: at 16:19

## 2023-09-19 RX ADMIN — AZITHROMYCIN 500 MG: 500 INJECTION, POWDER, LYOPHILIZED, FOR SOLUTION INTRAVENOUS at 06:24

## 2023-09-19 RX ADMIN — ENOXAPARIN SODIUM 40 MG: 100 INJECTION SUBCUTANEOUS at 18:05

## 2023-09-19 RX ADMIN — SODIUM CHLORIDE: 9 INJECTION, SOLUTION INTRAVENOUS at 09:51

## 2023-09-19 RX ADMIN — Medication: at 11:05

## 2023-09-19 ASSESSMENT — ENCOUNTER SYMPTOMS
HEADACHES: 0
FEVER: 0
BACK PAIN: 0
DIARRHEA: 0
WEAKNESS: 0
DIAPHORESIS: 0
CONSTIPATION: 0
NAUSEA: 0
NECK PAIN: 0
SORE THROAT: 0
VOMITING: 0
SPUTUM PRODUCTION: 1
DIZZINESS: 0
FOCAL WEAKNESS: 0
COUGH: 1
ABDOMINAL PAIN: 0
BRUISES/BLEEDS EASILY: 0
MYALGIAS: 0
CLAUDICATION: 0
EYE PAIN: 0
WHEEZING: 0
SPEECH CHANGE: 0
DEPRESSION: 1
SHORTNESS OF BREATH: 1
LOSS OF CONSCIOUSNESS: 0
EYE DISCHARGE: 0
CHILLS: 1
HEMOPTYSIS: 0
SENSORY CHANGE: 0
PALPITATIONS: 0

## 2023-09-19 ASSESSMENT — LIFESTYLE VARIABLES: SUBSTANCE_ABUSE: 0

## 2023-09-19 ASSESSMENT — PAIN DESCRIPTION - PAIN TYPE: TYPE: ACUTE PAIN

## 2023-09-19 NOTE — ED PROVIDER NOTES
ED Provider Note    CHIEF COMPLAINT  Chief Complaint   Patient presents with    RUQ Pain     Onset last night    Shortness of Breath    N/V       EXTERNAL RECORDS REVIEWED  Patient's recent visit for syphilis rule out approximately a 3 week ago patient's admission bilateral leg cellulitis in January    HPI/ROS  LIMITATION TO HISTORY   Patient is poor historian likely secondary to chronic methamphetamine abuse    Dio Ramirez is a 53 y.o. male who presents with chief complaint of right-sided rib pain.  Patient reports he has a history of broken ribs on the side.  He reports he is sleeping and believes he may have rolled over onto his ribs wrong.  He woke up with significant pain in his right ribs.  He denies any associated right upper quadrant abdominal pain.  Denies any fevers or chills.  Denies any dysuria urgency or frequency.  Patient denies any trauma.  Patient denies any associated back pain.  Patient has a history of polysubstance abuse, and snorts methamphetamine regularly.  Last use was 2 days ago.  Patient denies any IV drug use.  He denies any joint pain, dysuria urgency frequency penile lesions, difficulty ambulating or any other concerns.  He denies any significant shortness of breath but reports pain on deep breaths.    PAST MEDICAL HISTORY   has a past medical history of Bipolar 2 disorder (HCC) (2/23/2012) and Psoriasis.    SURGICAL HISTORY  patient denies any surgical history    FAMILY HISTORY  Family History   Problem Relation Age of Onset    Other Mother         epilepsy    Alcohol/Drug Father        SOCIAL HISTORY  Social History     Tobacco Use    Smoking status: Former     Types: Cigarettes    Smokeless tobacco: Never   Vaping Use    Vaping Use: Never used   Substance and Sexual Activity    Alcohol use: No    Drug use: Yes     Types: Inhaled     Comment: Meth use regulary last on 9/14/23, some opioid use, marijuana    Sexual activity: Yes     Partners: Female       CURRENT  MEDICATIONS  Home Medications       Reviewed by Virginia Ha R.N. (Registered Nurse) on 09/18/23 at 1645  Med List Status: Partial     Medication Last Dose Status   risperiDONE (RISPERDAL) 2 MG Tab  Active                    ALLERGIES  No Known Allergies    PHYSICAL EXAM  VITAL SIGNS: /57   Pulse 86   Temp 36.2 °C (97.1 °F) (Temporal)   Resp (!) 26   Wt 58.9 kg (129 lb 13.6 oz)   SpO2 93%   BMI 19.74 kg/m²    Physical Exam  Constitutional:       Appearance: Normal appearance.   HENT:      Head: Normocephalic.      Right Ear: Tympanic membrane normal.      Left Ear: Tympanic membrane normal.      Nose: Nose normal.      Mouth/Throat:      Mouth: Mucous membranes are moist.   Eyes:      Extraocular Movements: Extraocular movements intact.      Pupils: Pupils are equal, round, and reactive to light.   Cardiovascular:      Rate and Rhythm: Normal rate and regular rhythm.   Pulmonary:      Effort: Pulmonary effort is normal. No respiratory distress.      Breath sounds: Normal breath sounds. No stridor. No wheezing or rales.      Comments: Mild splinting, mild tenderness to the right lateral chest wall without crepitus or significant bony abnormality.  No associated right upper quadrant tenderness  Chest:      Chest wall: No tenderness.   Abdominal:      General: Abdomen is flat. There is no distension.      Palpations: Abdomen is soft. There is no mass.      Tenderness: There is no abdominal tenderness.   Musculoskeletal:      Cervical back: Normal range of motion.   Skin:     General: Skin is warm.      Capillary Refill: Capillary refill takes less than 2 seconds.   Neurological:      General: No focal deficit present.      Mental Status: He is alert and oriented to person, place, and time.   Psychiatric:         Mood and Affect: Mood normal.           DIAGNOSTIC STUDIES / PROCEDURES  EKG  I have independently interpreted this EKG  See below    LABS  Results for orders placed or performed during the  hospital encounter of 09/18/23   CBC WITH DIFFERENTIAL   Result Value Ref Range    WBC 20.9 (H) 4.8 - 10.8 K/uL    RBC 5.34 4.70 - 6.10 M/uL    Hemoglobin 15.2 14.0 - 18.0 g/dL    Hematocrit 45.6 42.0 - 52.0 %    MCV 85.4 81.4 - 97.8 fL    MCH 28.5 27.0 - 33.0 pg    MCHC 33.3 32.3 - 36.5 g/dL    RDW 41.9 35.9 - 50.0 fL    Platelet Count 246 164 - 446 K/uL    MPV 9.1 9.0 - 12.9 fL    Neutrophils-Polys 78.30 (H) 44.00 - 72.00 %    Lymphocytes 5.20 (L) 22.00 - 41.00 %    Monocytes 1.70 0.00 - 13.40 %    Eosinophils 0.00 0.00 - 6.90 %    Basophils 0.00 0.00 - 1.80 %    Nucleated RBC 0.00 0.00 - 0.20 /100 WBC    Neutrophils (Absolute) 19.27 (H) 1.82 - 7.42 K/uL    Lymphs (Absolute) 1.09 1.00 - 4.80 K/uL    Monos (Absolute) 0.36 0.00 - 0.85 K/uL    Eos (Absolute) 0.00 0.00 - 0.51 K/uL    Baso (Absolute) 0.00 0.00 - 0.12 K/uL    NRBC (Absolute) 0.00 K/uL    Anisocytosis 1+     Microcytosis 1+    COMP METABOLIC PANEL   Result Value Ref Range    Sodium 127 (L) 135 - 145 mmol/L    Potassium 4.2 3.6 - 5.5 mmol/L    Chloride 92 (L) 96 - 112 mmol/L    Co2 21 20 - 33 mmol/L    Anion Gap 14.0 7.0 - 16.0    Glucose 115 (H) 65 - 99 mg/dL    Bun 19 8 - 22 mg/dL    Creatinine 1.13 0.50 - 1.40 mg/dL    Calcium 9.3 8.5 - 10.5 mg/dL    Correct Calcium 9.4 8.5 - 10.5 mg/dL    AST(SGOT) 26 12 - 45 U/L    ALT(SGPT) 26 2 - 50 U/L    Alkaline Phosphatase 76 30 - 99 U/L    Total Bilirubin 1.4 0.1 - 1.5 mg/dL    Albumin 3.9 3.2 - 4.9 g/dL    Total Protein 7.3 6.0 - 8.2 g/dL    Globulin 3.4 1.9 - 3.5 g/dL    A-G Ratio 1.1 g/dL   LIPASE   Result Value Ref Range    Lipase 9 (L) 11 - 82 U/L   URINALYSIS    Specimen: Urine   Result Value Ref Range    Color Yellow     Character Clear     Specific Gravity 1.028 <1.035    Ph 5.5 5.0 - 8.0    Glucose Negative Negative mg/dL    Ketones Negative Negative mg/dL    Protein Negative Negative mg/dL    Bilirubin Negative Negative    Urobilinogen, Urine 1.0 Negative    Nitrite Negative Negative    Leukocyte  Esterase Negative Negative    Occult Blood Small (A) Negative    Micro Urine Req Microscopic    ESTIMATED GFR   Result Value Ref Range    GFR (CKD-EPI) 77 >60 mL/min/1.73 m 2   DIFFERENTIAL MANUAL   Result Value Ref Range    Bands-Stabs 13.90 (H) 0.00 - 10.00 %    Metamyelocytes 0.90 %    Manual Diff Status PERFORMED    PERIPHERAL SMEAR REVIEW   Result Value Ref Range    Peripheral Smear Review see below    PLATELET ESTIMATE   Result Value Ref Range    Plt Estimation Normal    MORPHOLOGY   Result Value Ref Range    RBC Morphology Present     Poikilocytosis 2+     Echinocytes 2+     Toxic Vacuoles Few     Dohle Bodies Few    Lactic Acid   Result Value Ref Range    Lactic Acid 2.7 (H) 0.5 - 2.0 mmol/L   Lactic Acid   Result Value Ref Range    Lactic Acid 2.0 0.5 - 2.0 mmol/L   URINE MICROSCOPIC (W/UA)   Result Value Ref Range    WBC 0-2 (A) /hpf    RBC 0-2 (A) /hpf    Bacteria Negative None /hpf    Epithelial Cells Negative /hpf    Hyaline Cast 0-2 /lpf   EKG   Result Value Ref Range    Report       [facility] Emergency Dept[facility]    Test Date:  2023  Pt Name:    LUIS MANUEL ORTEGA                Department: ER  MRN:        [id number]                      Room:  Gender:     Male                         Technician: [id number]  :        [id number]                   Requested By:ER TRIAGE PROTOCOL  Order #:    [id number]                    Reading MD:    Measurements  Intervals                                Axis  Rate:       84                           P:          67  MN:         146                          QRS:        33  QRSD:       96                           T:          64  QT:         344  QTc:        407    Interpretive Statements  Sinus rhythm  RSR' in V1 or V2, right VCD or RVH  Compared to ECG 2022 23:44:51  No significant changes           RADIOLOGY  I have independently interpreted the diagnostic imaging associated with this visit and am waiting the final reading from the radiologist.   My  preliminary interpretation is as follows: Likely pneumonia, most pronounced on the right  Radiologist interpretation:   CT-CTA CHEST PULMONARY ARTERY W/ RECONS   Final Result      1.  There is no CT evidence of acute pulmonary embolism.   2.  There is right lower lobe pneumonia.   3.  Additional groundglass opacities in the left lower lobe also consistent with probable pneumonia.   4.  Airspace disease with air bronchograms in the right middle lobe is probably due to collapse favored over pneumonia. No definite proximal endobronchial obstructive process is seen on CT.   5.  Soft tissue in the right hilum could be due to partial collapse of the right middle lobe. No measurable mass identified.   6.  There is a small dependent right pleural effusion.            [id number] (WITH 1-VIEW CXR) RIGHT   Final Result      1.  No displaced rib fracture.   2.  There is a right lower lobe pneumonia with a small amount of parapneumonic effusion.            COURSE & MEDICAL DECISION MAKING    CRITICAL CARE  The very real possibilty of a deterioration of this patient's condition required the highest level of my preparedness for sudden, emergent intervention.  I provided critical care services, which included medication orders, frequent reevaluations of the patient's condition and response to treatment, ordering and reviewing test results, and discussing the case with various consultants.  The critical care time associated with the care of the patient was 35 minutes. Review chart for interventions. This time is exclusive of any other billable procedures.       INITIAL ASSESSMENT, COURSE AND PLAN  Care Narrative: Patient here with acute onset rib pain.  I believe fracture is less likely, will check x-ray.  Given the lack of any trauma I believe a pulmonary embolus would be more likely though patient is adamant that he has a possible rib fracture.  He does have some reproducible pain on exam.  X-ray reveals evidence of  pneumonia.  Patient is afebrile, he denies any major significant infectious symptoms, will check CT to evaluate for possible pulmonary infarct as the cause of patient's chest x-ray findings and pain as a pulm embolus certainly could be causing patient's symptoms.  Patient white count is 20.  Given this and patient's x-ray findings I do believe infectious and is possible, therefore blood cultures, lactic acid were sent.  Patient was given empiric antibiotics.  Identifying sepsis occurred at approximately 1931.  Patient CT redemonstrates pneumonia.  No evidence of pulmonary embolus.  Case discussed with hospitalist who has agreed to admit.        DISPOSITION AND DISCUSSIONS  I have discussed management of the patient with the following physicians and NIYA's: Hospitalist    FINAL DIAGNOSIS  Commune acquired pneumonia, acute chest pain, sepsis

## 2023-09-19 NOTE — H&P
Hospital Medicine History & Physical Note    Date of Service  9/18/2023    Primary Care Physician  Pcp Pt States None    Consultants  NONE    Code Status  Full Code    Chief Complaint  Chief Complaint   Patient presents with    RUQ Pain     Onset last night    Shortness of Breath    N/V       History of Presenting Illness  Dio Ramirez is a 53 y.o. male past medical history of homelessness, methamphetamine abuse who presented 9/18/2023 with left-sided chest pain worse with breathing that started earlier this morning.  Denies any trauma, fever or chills.  Admits to using methamphetamine.  In the ER, CT of the chest revealed bilateral pneumonia started on broad-spectrum antibiotics.  Did meet sepsis criteria with elevated lactic acid above 2 and will be hospitalized subsequently for the above.    I discussed the plan of care with patient, bedside RN, and ER physician .    Review of Systems  Review of Systems   Respiratory:  Positive for cough, sputum production and shortness of breath.    Cardiovascular:  Positive for chest pain.       Past Medical History   has a past medical history of Bipolar 2 disorder (HCC) (2/23/2012) and Psoriasis.    Surgical History   has no past surgical history on file.     Family History  family history includes Alcohol/Drug in his father; Other in his mother.   Family history reviewed with patient. There is no family history that is pertinent to the chief complaint.     Social History   reports that he has quit smoking. His smoking use included cigarettes. He has never used smokeless tobacco. He reports current drug use. Drug: Inhaled. He reports that he does not drink alcohol.    Allergies  No Known Allergies    Medications  Prior to Admission Medications   Prescriptions Last Dose Informant Patient Reported? Taking?   risperiDONE (RISPERDAL) 2 MG Tab  Patient Yes No   Sig: Take 2 mg by mouth 2 times a day. At 0900 and 2100      Facility-Administered Medications: None        Physical Exam  Temp:  [36.2 °C (97.1 °F)-37.1 °C (98.7 °F)] 37.1 °C (98.7 °F)  Pulse:  [80-98] 98  Resp:  [20-26] 20  BP: ()/(55-82) 91/60  SpO2:  [93 %-94 %] 94 %  Blood Pressure: 91/60   Temperature: 37.1 °C (98.7 °F)   Pulse: 98   Respiration: 20   Pulse Oximetry: 94 %       Physical Exam  Vitals and nursing note reviewed.   Constitutional:       Appearance: Normal appearance.   HENT:      Head: Normocephalic and atraumatic.      Right Ear: Tympanic membrane normal.      Left Ear: Tympanic membrane normal.      Nose: Nose normal.      Mouth/Throat:      Mouth: Mucous membranes are moist.      Pharynx: Oropharynx is clear.   Eyes:      Extraocular Movements: Extraocular movements intact.      Pupils: Pupils are equal, round, and reactive to light.   Cardiovascular:      Rate and Rhythm: Normal rate and regular rhythm.      Pulses: Normal pulses.      Heart sounds: Normal heart sounds.   Pulmonary:      Breath sounds: Rhonchi present.      Comments: Tachypneic  Abdominal:      General: Bowel sounds are normal. There is no distension.      Palpations: Abdomen is soft. There is no mass.   Musculoskeletal:         General: Normal range of motion.      Cervical back: Neck supple.   Skin:     General: Skin is warm.      Capillary Refill: Capillary refill takes less than 2 seconds.   Neurological:      General: No focal deficit present.      Mental Status: He is alert and oriented to person, place, and time. Mental status is at baseline.   Psychiatric:         Mood and Affect: Mood normal.         Behavior: Behavior normal.         Laboratory:  Recent Labs     09/18/23  1653   WBC 20.9*   RBC 5.34   HEMOGLOBIN 15.2   HEMATOCRIT 45.6   MCV 85.4   MCH 28.5   MCHC 33.3   RDW 41.9   PLATELETCT 246   MPV 9.1     Recent Labs     09/18/23  1653   SODIUM 127*   POTASSIUM 4.2   CHLORIDE 92*   CO2 21   GLUCOSE 115*   BUN 19   CREATININE 1.13   CALCIUM 9.3     Recent Labs     09/18/23  1653   ALTSGPT 26   ASTSGOT 26  "  ALKPHOSPHAT 76   TBILIRUBIN 1.4   LIPASE 9*   GLUCOSE 115*         No results for input(s): \"NTPROBNP\" in the last 72 hours.      No results for input(s): \"TROPONINT\" in the last 72 hours.    Imaging:  CT-CTA CHEST PULMONARY ARTERY W/ RECONS   Final Result      1.  There is no CT evidence of acute pulmonary embolism.   2.  There is right lower lobe pneumonia.   3.  Additional groundglass opacities in the left lower lobe also consistent with probable pneumonia.   4.  Airspace disease with air bronchograms in the right middle lobe is probably due to collapse favored over pneumonia. No definite proximal endobronchial obstructive process is seen on CT.   5.  Soft tissue in the right hilum could be due to partial collapse of the right middle lobe. No measurable mass identified.   6.  There is a small dependent right pleural effusion.            [id number] (WITH 1-VIEW CXR) RIGHT   Final Result      1.  No displaced rib fracture.   2.  There is a right lower lobe pneumonia with a small amount of parapneumonic effusion.          X-Ray:  I have personally reviewed the images and compared with prior images.    Assessment/Plan:  Justification for Admission Status  I anticipate this patient will require at least two midnights for appropriate medical management, necessitating inpatient admission because sepsis secondary to PNA.    Patient will need a Med/Surg bed on MEDICAL service .  The need is secondary to see above.    * Sepsis (HCC)  Assessment & Plan  This is Sepsis Present on admission  SIRS criteria identified on my evaluation include: Tachypnea, with respirations greater than 20 per minute and Leukocytosis, with WBC greater than 12,000  Clinical indicators of end organ dysfunction include Lactic Acid greater than 2  Source is pneumonia  Sepsis protocol initiated  Crystalloid Fluid Administration: Fluid resuscitation ordered per standard protocol - 30 mL/kg per current or ideal body weight  IV antibiotics as " appropriate for source of sepsis  Reassessment: I have reassessed the patient's hemodynamic status    Pneumonia due to infectious agent- (present on admission)  Assessment & Plan  Ceftriaxone/Zithromax  Follow-up respiratory and blood cultures      Methamphetamine use (HCC)- (present on admission)  Assessment & Plan  Encouraged sobriety         VTE prophylaxis: SCDs/TEDs and enoxaparin ppx

## 2023-09-19 NOTE — PROGRESS NOTES
"4 Eyes Skin Assessment Completed by DEEDEE Ojeda and DEEDEE Singh.    Head WDL  Ears Redness - scab on left ear \"from sunburn\"  Nose WDL  Mouth WDL  Neck WDL  Breast/Chest Scab scattered scabs  Shoulder Blades WDL  Spine WDL  (R) Arm/Elbow/Hand WDL  (L) Arm/Elbow/Hand WDL  Abdomen WDL  Groin WDL  Scrotum/Coccyx/Buttocks WDL  (R) Leg Scab    (L) Leg Scab  numerous large scabs on bilateral legs and feet, some open non weeping  (R) Heel/Foot/Toe Scab  (L) Heel/Foot/Toe Scab        Interventions In Place Pillows    Possible Skin Injury No    Pictures Uploaded Into Epic Yes  Wound Consult Placed Yes  RN Wound Prevention Protocol Ordered Yes   "

## 2023-09-19 NOTE — ASSESSMENT & PLAN NOTE
This is Sepsis Present on admission  SIRS criteria identified on my evaluation include: Tachypnea, with respirations greater than 20 per minute and Leukocytosis, with WBC greater than 12,000  Clinical indicators of end organ dysfunction include Lactic Acid greater than 2  Source is pneumonia  Sepsis protocol initiated  Crystalloid Fluid Administration: Fluid resuscitation ordered per standard protocol - 30 mL/kg per current or ideal body weight  IV antibiotics as appropriate for source of sepsis  Reassessment: I have reassessed the patient's hemodynamic status

## 2023-09-19 NOTE — CARE PLAN
The patient is Stable - Low risk of patient condition declining or worsening    Shift Goals  Clinical Goals: Safety  Patient Goals: Rest    Progress made toward(s) clinical / shift goals:  Pt requested help as needed.  He rested comfortably most of the night.      Problem: Knowledge Deficit - Standard  Goal: Patient and family/care givers will demonstrate understanding of plan of care, disease process/condition, diagnostic tests and medications  Outcome: Progressing     Problem: Hemodynamics  Goal: Patient's hemodynamics, fluid balance and neurologic status will be stable or improve  Outcome: Progressing       Patient is not progressing towards the following goals:

## 2023-09-19 NOTE — WOUND TEAM
[facility]  Inpatient Services  Wound and Skin Care Brief Evaluation    Admission Date: 9/18/2023     Last order of IP CONSULT TO WOUND CARE was found on 9/19/2023 from Hospital Encounter on 9/18/2023     HPI, PMH, SH: Reviewed    Chief Complaint   Patient presents with    RUQ Pain     Onset last night    Shortness of Breath    N/V     Diagnosis: Pneumonia due to infectious agent [J18.9]    Unit where seen by Wound Team: S535/02     Wound consult placed regarding bilateral LEs. Chart and images reviewed. This discussed with bedside RN. This clinician in to assess patient. Patient pleasant and agreeable. Non-selectively debrided with Moist warm washcloth.     No pressure injuries or advanced wound care needs identified. Wound consult completed. Moisturizer to bilateral LEs, aquaphor ordered. No further follow up unless indicated and consulted.          PREVENTATIVE INTERVENTIONS:    Q shift Eliazar - performed per nursing policy  Q shift pressure point assessments - performed per nursing policy    Surface/Positioning  Standard/trauma mattress - Currently in Place    Offloading/Redistribution  Float Heels off Bed with Pillows - Currently in Place           Respiratory  N/A    Containment/Moisture Prevention    N/A    Mobilization      Up to chair, Ambulate , and Ambulating at Baseline

## 2023-09-20 LAB
AMPHET UR QL SCN: POSITIVE
ANION GAP SERPL CALC-SCNC: 8 MMOL/L (ref 7–16)
BARBITURATES UR QL SCN: NEGATIVE
BASOPHILS # BLD AUTO: 0.3 % (ref 0–1.8)
BASOPHILS # BLD: 0.03 K/UL (ref 0–0.12)
BENZODIAZ UR QL SCN: NEGATIVE
BUN SERPL-MCNC: 12 MG/DL (ref 8–22)
BZE UR QL SCN: NEGATIVE
CALCIUM SERPL-MCNC: 8.9 MG/DL (ref 8.5–10.5)
CANNABINOIDS UR QL SCN: NEGATIVE
CHLORIDE SERPL-SCNC: 105 MMOL/L (ref 96–112)
CO2 SERPL-SCNC: 23 MMOL/L (ref 20–33)
CREAT SERPL-MCNC: 0.69 MG/DL (ref 0.5–1.4)
CRP SERPL HS-MCNC: 15.23 MG/DL (ref 0–0.75)
EOSINOPHIL # BLD AUTO: 0.19 K/UL (ref 0–0.51)
EOSINOPHIL NFR BLD: 1.7 % (ref 0–6.9)
ERYTHROCYTE [DISTWIDTH] IN BLOOD BY AUTOMATED COUNT: 42.3 FL (ref 35.9–50)
FENTANYL UR QL: NEGATIVE
GFR SERPLBLD CREATININE-BSD FMLA CKD-EPI: 110 ML/MIN/1.73 M 2
GLUCOSE SERPL-MCNC: 96 MG/DL (ref 65–99)
HCT VFR BLD AUTO: 33.4 % (ref 42–52)
HGB BLD-MCNC: 11.3 G/DL (ref 14–18)
IMM GRANULOCYTES # BLD AUTO: 0.06 K/UL (ref 0–0.11)
IMM GRANULOCYTES NFR BLD AUTO: 0.5 % (ref 0–0.9)
LYMPHOCYTES # BLD AUTO: 1.48 K/UL (ref 1–4.8)
LYMPHOCYTES NFR BLD: 13.2 % (ref 22–41)
MCH RBC QN AUTO: 29 PG (ref 27–33)
MCHC RBC AUTO-ENTMCNC: 33.8 G/DL (ref 32.3–36.5)
MCV RBC AUTO: 85.6 FL (ref 81.4–97.8)
METHADONE UR QL SCN: NEGATIVE
MONOCYTES # BLD AUTO: 0.67 K/UL (ref 0–0.85)
MONOCYTES NFR BLD AUTO: 6 % (ref 0–13.4)
NEUTROPHILS # BLD AUTO: 8.82 K/UL (ref 1.82–7.42)
NEUTROPHILS NFR BLD: 78.3 % (ref 44–72)
NRBC # BLD AUTO: 0 K/UL
NRBC BLD-RTO: 0 /100 WBC (ref 0–0.2)
OPIATES UR QL SCN: NEGATIVE
OXYCODONE UR QL SCN: NEGATIVE
PCP UR QL SCN: NEGATIVE
PLATELET # BLD AUTO: 218 K/UL (ref 164–446)
PMV BLD AUTO: 8.9 FL (ref 9–12.9)
POTASSIUM SERPL-SCNC: 3.9 MMOL/L (ref 3.6–5.5)
PROCALCITONIN SERPL-MCNC: 4.51 NG/ML
PROPOXYPH UR QL SCN: NEGATIVE
RBC # BLD AUTO: 3.9 M/UL (ref 4.7–6.1)
SODIUM SERPL-SCNC: 136 MMOL/L (ref 135–145)
WBC # BLD AUTO: 11.3 K/UL (ref 4.8–10.8)

## 2023-09-20 PROCEDURE — 82652 VIT D 1 25-DIHYDROXY: CPT

## 2023-09-20 PROCEDURE — 700102 HCHG RX REV CODE 250 W/ 637 OVERRIDE(OP): Performed by: STUDENT IN AN ORGANIZED HEALTH CARE EDUCATION/TRAINING PROGRAM

## 2023-09-20 PROCEDURE — 700102 HCHG RX REV CODE 250 W/ 637 OVERRIDE(OP): Performed by: HOSPITALIST

## 2023-09-20 PROCEDURE — 85025 COMPLETE CBC W/AUTO DIFF WBC: CPT

## 2023-09-20 PROCEDURE — 84145 PROCALCITONIN (PCT): CPT

## 2023-09-20 PROCEDURE — 80307 DRUG TEST PRSMV CHEM ANLYZR: CPT

## 2023-09-20 PROCEDURE — 99223 1ST HOSP IP/OBS HIGH 75: CPT | Mod: GC | Performed by: PSYCHIATRY & NEUROLOGY

## 2023-09-20 PROCEDURE — 700101 HCHG RX REV CODE 250: Performed by: HOSPITALIST

## 2023-09-20 PROCEDURE — 700111 HCHG RX REV CODE 636 W/ 250 OVERRIDE (IP): Performed by: HOSPITALIST

## 2023-09-20 PROCEDURE — 99232 SBSQ HOSP IP/OBS MODERATE 35: CPT | Performed by: HOSPITALIST

## 2023-09-20 PROCEDURE — 770001 HCHG ROOM/CARE - MED/SURG/GYN PRIV*

## 2023-09-20 PROCEDURE — 700111 HCHG RX REV CODE 636 W/ 250 OVERRIDE (IP): Mod: JZ | Performed by: STUDENT IN AN ORGANIZED HEALTH CARE EDUCATION/TRAINING PROGRAM

## 2023-09-20 PROCEDURE — 80048 BASIC METABOLIC PNL TOTAL CA: CPT

## 2023-09-20 PROCEDURE — A9270 NON-COVERED ITEM OR SERVICE: HCPCS | Performed by: HOSPITALIST

## 2023-09-20 PROCEDURE — A9270 NON-COVERED ITEM OR SERVICE: HCPCS | Performed by: STUDENT IN AN ORGANIZED HEALTH CARE EDUCATION/TRAINING PROGRAM

## 2023-09-20 PROCEDURE — 86140 C-REACTIVE PROTEIN: CPT

## 2023-09-20 PROCEDURE — 36415 COLL VENOUS BLD VENIPUNCTURE: CPT

## 2023-09-20 RX ORDER — RISPERIDONE 1 MG/1
1 TABLET ORAL 2 TIMES DAILY
Status: DISCONTINUED | OUTPATIENT
Start: 2023-09-20 | End: 2023-09-22 | Stop reason: HOSPADM

## 2023-09-20 RX ADMIN — RISPERIDONE 1 MG: 1 TABLET ORAL at 17:21

## 2023-09-20 RX ADMIN — Medication: at 00:14

## 2023-09-20 RX ADMIN — ACETAMINOPHEN 650 MG: 325 TABLET, FILM COATED ORAL at 05:23

## 2023-09-20 RX ADMIN — OXYCODONE HYDROCHLORIDE AND ACETAMINOPHEN 500 MG: 500 TABLET ORAL at 05:12

## 2023-09-20 RX ADMIN — CEFTRIAXONE SODIUM 1000 MG: 10 INJECTION, POWDER, FOR SOLUTION INTRAVENOUS at 10:26

## 2023-09-20 ASSESSMENT — ENCOUNTER SYMPTOMS
ABDOMINAL PAIN: 0
DIZZINESS: 0
WHEEZING: 0
EYE PAIN: 0
SHORTNESS OF BREATH: 1
NAUSEA: 0
WEAKNESS: 0
SPEECH CHANGE: 0
CONSTIPATION: 0
LOSS OF CONSCIOUSNESS: 0
MYALGIAS: 0
COUGH: 1
VOMITING: 0
CHILLS: 1
PALPITATIONS: 0
EYES NEGATIVE: 1
GASTROINTESTINAL NEGATIVE: 1
BRUISES/BLEEDS EASILY: 0
SPUTUM PRODUCTION: 1
CLAUDICATION: 0
BACK PAIN: 0
SORE THROAT: 0
FEVER: 0
NECK PAIN: 0
DIARRHEA: 0
SENSORY CHANGE: 0
HEADACHES: 0
HEMOPTYSIS: 0
FOCAL WEAKNESS: 0
DIAPHORESIS: 0
DEPRESSION: 1
EYE DISCHARGE: 0

## 2023-09-20 ASSESSMENT — LIFESTYLE VARIABLES: SUBSTANCE_ABUSE: 0

## 2023-09-20 ASSESSMENT — PAIN DESCRIPTION - PAIN TYPE: TYPE: ACUTE PAIN
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
## 2023-09-20 NOTE — PROGRESS NOTES
Hospital Medicine Daily Progress Note    Date of Service  9/19/2023    Chief Complaint  Dio Ramirez is a 53 y.o. male admitted 9/18/2023 with SOB, cough, chest pain, CXR with pneumonia found to have COVID 19.    Hospital Course  Dio Ramirez is a 53 y.o. male past medical history of homelessness, methamphetamine abuse who presented 9/18/2023 with left-sided chest pain worse with breathing that started earlier this morning.  Denies any trauma, fever or chills.  Admits to using methamphetamine.  In the ER, CT of the chest revealed bilateral pneumonia started on broad-spectrum antibiotics.      Interval Problem Update  9/19: ordered COVID 19 testing, +, stopped Rocephin, check procalcitonin.  Started decadron 6mg po daily x 10 days.  ISS.  Patient tearful telling me he just wants to end it all.  Referring to his life.  He states he is unable to recover from losing his wife 3 years ago.  He also told the nurse the same.  No specific plan of suicide, just generalized statements.  Legal hold initiated by me.  Psychiatry consulted and charge RN aware needs sitter.    I have discussed this patient's plan of care and discharge plan at IDT rounds today with Case Management, Nursing, Nursing leadership, and other members of the IDT team.    Consultants/Specialty  psychiatry    Code Status  Full Code    Disposition  The patient is not medically cleared for discharge to home or a post-acute facility.  Anticipate discharge to: a psychiatric hospital    I have placed the appropriate orders for post-discharge needs.    Review of Systems  Review of Systems   Constitutional:  Positive for chills and malaise/fatigue. Negative for diaphoresis and fever.   HENT:  Negative for congestion and sore throat.    Eyes:  Negative for pain and discharge.   Respiratory:  Positive for cough, sputum production and shortness of breath. Negative for hemoptysis and wheezing.    Cardiovascular:  Negative for chest pain, palpitations,  claudication and leg swelling.   Gastrointestinal:  Negative for abdominal pain, constipation, diarrhea, melena, nausea and vomiting.   Genitourinary:  Negative for dysuria, frequency and urgency.   Musculoskeletal:  Negative for back pain, joint pain, myalgias and neck pain.   Skin:  Negative for itching and rash.   Neurological:  Negative for dizziness, sensory change, speech change, focal weakness, loss of consciousness, weakness and headaches.   Endo/Heme/Allergies:  Does not bruise/bleed easily.   Psychiatric/Behavioral:  Positive for depression and suicidal ideas. Negative for substance abuse.         Physical Exam  Temp:  [36.1 °C (96.9 °F)-37.1 °C (98.7 °F)] 36.4 °C (97.6 °F)  Pulse:  [72-98] 72  Resp:  [16-20] 16  BP: ()/(55-82) 95/61  SpO2:  [94 %-95 %] 95 %    Physical Exam  Vitals and nursing note reviewed.   Constitutional:       General: He is not in acute distress.     Appearance: He is ill-appearing. He is not diaphoretic.   HENT:      Head: Normocephalic and atraumatic.      Mouth/Throat:      Pharynx: No oropharyngeal exudate.   Eyes:      General: No scleral icterus.        Right eye: No discharge.         Left eye: No discharge.      Conjunctiva/sclera: Conjunctivae normal.      Pupils: Pupils are equal, round, and reactive to light.   Neck:      Thyroid: No thyromegaly.      Vascular: No JVD.      Trachea: No tracheal deviation.   Cardiovascular:      Rate and Rhythm: Normal rate and regular rhythm.      Heart sounds: Normal heart sounds. No murmur heard.     No friction rub. No gallop.   Pulmonary:      Effort: Pulmonary effort is normal. No respiratory distress.      Breath sounds: Normal breath sounds. No wheezing or rales.   Chest:      Chest wall: No tenderness.   Abdominal:      General: Bowel sounds are normal. There is no distension.      Palpations: Abdomen is soft. There is no mass.      Tenderness: There is no abdominal tenderness. There is no guarding or rebound.    Musculoskeletal:         General: No tenderness. Normal range of motion.      Cervical back: Normal range of motion and neck supple.   Lymphadenopathy:      Cervical: No cervical adenopathy.   Skin:     General: Skin is warm and dry.      Findings: No erythema or rash.   Neurological:      General: No focal deficit present.      Mental Status: He is alert and oriented to person, place, and time.      Cranial Nerves: No cranial nerve deficit.      Motor: No abnormal muscle tone.   Psychiatric:         Attention and Perception: He is inattentive.         Mood and Affect: Mood is depressed. Affect is tearful.         Speech: Speech is rapid and pressured.         Behavior: Behavior normal. Behavior is cooperative.         Thought Content: Thought content includes suicidal ideation.         Cognition and Memory: Cognition and memory normal.         Judgment: Judgment is impulsive.         Fluids    Intake/Output Summary (Last 24 hours) at 9/19/2023 1844  Last data filed at 9/19/2023 0930  Gross per 24 hour   Intake 574 ml   Output 2950 ml   Net -2376 ml       Laboratory  Recent Labs     09/18/23  1653 09/19/23  1444   WBC 20.9* 16.7*   RBC 5.34 4.05*   HEMOGLOBIN 15.2 11.9*   HEMATOCRIT 45.6 34.7*   MCV 85.4 85.7   MCH 28.5 29.4   MCHC 33.3 34.3   RDW 41.9 42.2   PLATELETCT 246 216   MPV 9.1 9.0     Recent Labs     09/18/23  1653 09/19/23  1307   SODIUM 127* 134*   POTASSIUM 4.2 3.8   CHLORIDE 92* 102   CO2 21 24   GLUCOSE 115* 128*   BUN 19 15   CREATININE 1.13 0.68   CALCIUM 9.3 8.4*                   Imaging  CT-CTA CHEST PULMONARY ARTERY W/ RECONS   Final Result      1.  There is no CT evidence of acute pulmonary embolism.   2.  There is right lower lobe pneumonia.   3.  Additional groundglass opacities in the left lower lobe also consistent with probable pneumonia.   4.  Airspace disease with air bronchograms in the right middle lobe is probably due to collapse favored over pneumonia. No definite proximal  "endobronchial obstructive process is seen on CT.   5.  Soft tissue in the right hilum could be due to partial collapse of the right middle lobe. No measurable mass identified.   6.  There is a small dependent right pleural effusion.            [id number] (WITH 1-VIEW CXR) RIGHT   Final Result      1.  No displaced rib fracture.   2.  There is a right lower lobe pneumonia with a small amount of parapneumonic effusion.           Assessment/Plan  * Sepsis (HCC)- (present on admission)  Assessment & Plan  This is Sepsis Present on admission  SIRS criteria identified on my evaluation include: Tachypnea, with respirations greater than 20 per minute and Leukocytosis, with WBC greater than 12,000  Clinical indicators of end organ dysfunction include Lactic Acid greater than 2  Source is pneumonia  Sepsis protocol initiated  Crystalloid Fluid Administration: Fluid resuscitation ordered per standard protocol - 30 mL/kg per current or ideal body weight  IV antibiotics as appropriate for source of sepsis  Reassessment: I have reassessed the patient's hemodynamic status    Suicidal ideation- (present on admission)  Assessment & Plan  Patient tearful and told RN and MD separate interviews that he wants to \"end it all\".  Referring to his life.  Explained that he likely does not feel good from COVID 19 infection.  States he has been depressed x 3 years since his wife is gone.  Sitter 1:1  Legal hold initiated by myself.  Psychiatry consult placed.    Pneumonia due to COVID-19 virus- (present on admission)  Assessment & Plan  Ceftriaxone/Zithromax  Follow-up respiratory and blood cultures      Methamphetamine use (HCC)- (present on admission)  Assessment & Plan  Encouraged sobriety     Homeless- (present on admission)  Assessment & Plan  States lives in a shelter since wife left 3 years ago.  Unclear if she  or .         VTE prophylaxis:    enoxaparin ppx      I have performed a physical exam and reviewed and " updated ROS and Plan today (9/19/2023). In review of yesterday's note (9/18/2023), there are no changes except as documented above.

## 2023-09-20 NOTE — CARE PLAN
The patient is Stable - Low risk of patient condition declining or worsening    Shift Goals  Clinical Goals: safety  Patient Goals: rest, comfort    Progress made toward(s) clinical / shift goals:  continued legal hold 2000 with 1:1 sitter.       Problem: Pain - Standard  Goal: Alleviation of pain or a reduction in pain to the patient’s comfort goal  Outcome: Progressing     Problem: Knowledge Deficit - Standard  Goal: Patient and family/care givers will demonstrate understanding of plan of care, disease process/condition, diagnostic tests and medications  Outcome: Progressing

## 2023-09-20 NOTE — DOCUMENTATION QUERY
"                                                                         Formerly [facility], later [facility]                                                                       Query Response Note      PATIENT:               LUIS MANUEL ORTEGA  ACCT #:                  [id number]  MRN:                     [id number]  :                      1970  ADMIT DATE:       2023 5:19 PM  DISCH DATE:          RESPONDING  PROVIDER #:        [id number]           QUERY TEXT:    In your clinical judgment please clarify the lactic acid level of 2.7 with a corelating diagnosis.    Note: If you agree with a diagnosis listed above, please remember to include it in your concurrent daily documentation and onto the Discharge Summary.        The patient's clinical indicators include:  53 year old female admitted for COVID.     Simran \"Clinical indicators of end organ dysfunction include Lactic Acid greater than 2.\"  \"Crystalloid Fluid Administration: Fluid resuscitation ordered per standard protocol - 30 mL/kg per current or ideal body weight\"      \"Did meet sepsis criteria with elevated lactic acid above 2 and will be hospitalized subsequently for the above.\"    Lactic acid 2.7    Risk factors: sepsis, COVID, SI  Treatment: labs, fluid resuscitation, ABX, CXR, CT    If you have any questions, please contact:  Shira Juárez RN CDI [facility]  [email or web address]  Shira Juárez [address] [employer]  Options provided:   -- Lactic acidosis due to COVID Sepsis   -- Lactic acidosis is not due to COVID Sepsis   -- Lactic acidosis ruled out   -- Other explanation, Please specify   -- Unable to determine      Query created by: Shira Juárez on 2023 8:32 AM    RESPONSE TEXT:    Lactic acidosis due to COVID Sepsis          Electronically signed by:  KVNG ANGUIANO MD 2023 11:42 AM              "

## 2023-09-20 NOTE — ASSESSMENT & PLAN NOTE
"Patient tearful and told RN and MD separate interviews that he wants to \"end it all\".  Referring to his life.  Explained that he likely does not feel good from COVID 19 infection.  States he has been depressed x 3 years since his wife is gone.  Sara 1:1  Legal hold initiated by myself.  Psychiatry consult placed.  9/21:  Improved mood with risperdal 1mg po bid.  Psychotherapy helping.  "

## 2023-09-20 NOTE — PROGRESS NOTES
Assumed care of pt at 0700. Pt Aox4. Denies pain. Calm and cooperative this am. Sitter at bedside for safety. Pt reports no active plan or wish to hurt himself. Call bell within reach.

## 2023-09-20 NOTE — PROGRESS NOTES
Isolation Precautions:    Patient is on Enhanced Droplet  isolation for Covid Pneumonia.    Patient educated on reason for isolation, how the infection may be transmitted, and how to help prevent transmission to others.     Patient educated that Enhanced droplet precautions involves staff and visitors wearing PPE, follow  Standard Precautions and perform meticulous hand hygiene in order to prevent transmission of infection. (Contact Precautions: gown and gloves; Special Contact Precautions: gown and gloves, with the added requirement of soap and water for hand hygiene; Droplet Precautions: surgical mask worn by staff and visitors in the room, and worn by the patient when out of the room; Airborne Precautions: involves staff wearing PPE to include an N95 Respirator or Controlled Air Purifying Respirator (CAPR).  Visitors should be limited and may wear an N95 mask).         Patient transport and mobilization on unit. Patient educated that they may leave their room, but prior to exiting, the patient needs to have on a fresh patient gown, ensure the potentially infectious area is covered, perform appropriate hand hygiene immediately prior to exiting the room. (*For Airborne Precautions: Patient educated that time out of the room should be minimized and limited to transport to diagnostic procedures or other activities. Patient is to wear surgical mask when required to leave the patient room).

## 2023-09-20 NOTE — PROGRESS NOTES
"Received alert and oriented x 4. Verbalized \" he is worthless\". Depressed then joking around. On legal hold with 1:1 sitter for SI. Denies hurting himself or others. Check vitals sign and recorded accordingly and due med given per MAR. Monitor sign and symptoms of respiratory distress and treatment given accordingly per MAR.Medicated per MAR and reassessed every 2 hours per protocol. Call light within reach. Needs attended. Will continue to monitor.BP 94/60   Pulse 76   Temp 36.6 °C (97.8 °F) (Temporal)   Resp 18   Wt 58.9 kg (129 lb 13.6 oz)   SpO2 96%   BMI 19.74 kg/m² .   "

## 2023-09-20 NOTE — PROGRESS NOTES
Hospital Medicine Daily Progress Note    Date of Service  9/20/2023    Chief Complaint  Dio Ramirez is a 53 y.o. male admitted 9/18/2023 with SOB, cough, chest pain, CXR with pneumonia found to have COVID 19.    Hospital Course  Dio Ramirez is a 53 y.o. male past medical history of homelessness, methamphetamine abuse who presented 9/18/2023 with left-sided chest pain worse with breathing that started earlier this morning.  Denies any trauma, fever or chills.  Admits to using methamphetamine.  In the ER, CT of the chest revealed bilateral pneumonia started on broad-spectrum antibiotics.      Interval Problem Update  9/19: ordered COVID 19 testing, +, stopped Rocephin, check procalcitonin.  Started decadron 6mg po daily x 10 days.  ISS.  Patient tearful telling me he just wants to end it all.  Referring to his life.  He states he is unable to recover from losing his wife 3 years ago.  He also told the nurse the same.  No specific plan of suicide, just generalized statements.  Legal hold initiated by me.  Psychiatry consulted and charge RN aware needs sitter.  9/20:  remains depressed about his wife's death 3 years ago.  Pending psychiatry evaluation.  On 1:1 sitter hold, continues with some left sided chest pain from pneumonia. Procalcitonin elevated at 4.51, complete 5 days Rocephin IV, zithromax x 3 days.  On RA.    I have discussed this patient's plan of care and discharge plan at IDT rounds today with Case Management, Nursing, Nursing leadership, and other members of the IDT team.    Consultants/Specialty  psychiatry    Code Status  Full Code    Disposition  The patient is not medically cleared for discharge to home or a post-acute facility.  Anticipate discharge to: a psychiatric hospital    I have placed the appropriate orders for post-discharge needs.    Review of Systems  Review of Systems   Constitutional:  Positive for chills and malaise/fatigue. Negative for diaphoresis and fever.   HENT:   Negative for congestion and sore throat.    Eyes:  Negative for pain and discharge.   Respiratory:  Positive for cough, sputum production and shortness of breath. Negative for hemoptysis and wheezing.    Cardiovascular:  Negative for chest pain, palpitations, claudication and leg swelling.   Gastrointestinal:  Negative for abdominal pain, constipation, diarrhea, melena, nausea and vomiting.   Genitourinary:  Negative for dysuria, frequency and urgency.   Musculoskeletal:  Negative for back pain, joint pain, myalgias and neck pain.   Skin:  Negative for itching and rash.   Neurological:  Negative for dizziness, sensory change, speech change, focal weakness, loss of consciousness, weakness and headaches.   Endo/Heme/Allergies:  Does not bruise/bleed easily.   Psychiatric/Behavioral:  Positive for depression and suicidal ideas. Negative for substance abuse.         Physical Exam  Temp:  [36.4 °C (97.6 °F)-37.1 °C (98.7 °F)] 37.1 °C (98.7 °F)  Pulse:  [72-78] 78  Resp:  [16-18] 17  BP: (92-95)/(60-63) 93/63  SpO2:  [95 %-96 %] 96 %    Physical Exam  Vitals and nursing note reviewed.   Constitutional:       General: He is not in acute distress.     Appearance: He is ill-appearing. He is not diaphoretic.   HENT:      Head: Normocephalic and atraumatic.      Mouth/Throat:      Pharynx: No oropharyngeal exudate.   Eyes:      General: No scleral icterus.        Right eye: No discharge.         Left eye: No discharge.      Conjunctiva/sclera: Conjunctivae normal.      Pupils: Pupils are equal, round, and reactive to light.   Neck:      Thyroid: No thyromegaly.      Vascular: No JVD.      Trachea: No tracheal deviation.   Cardiovascular:      Rate and Rhythm: Normal rate and regular rhythm.      Heart sounds: Normal heart sounds. No murmur heard.     No friction rub. No gallop.   Pulmonary:      Effort: Pulmonary effort is normal. No respiratory distress.      Breath sounds: Normal breath sounds. No wheezing or rales.   Chest:       Chest wall: No tenderness.   Abdominal:      General: Bowel sounds are normal. There is no distension.      Palpations: Abdomen is soft. There is no mass.      Tenderness: There is no abdominal tenderness. There is no guarding or rebound.   Musculoskeletal:         General: No tenderness. Normal range of motion.      Cervical back: Normal range of motion and neck supple.   Lymphadenopathy:      Cervical: No cervical adenopathy.   Skin:     General: Skin is warm and dry.      Findings: No erythema or rash.   Neurological:      General: No focal deficit present.      Mental Status: He is alert and oriented to person, place, and time.      Cranial Nerves: No cranial nerve deficit.      Motor: No abnormal muscle tone.   Psychiatric:         Attention and Perception: He is inattentive.         Mood and Affect: Mood is depressed. Affect is tearful.         Speech: Speech is rapid and pressured.         Behavior: Behavior normal. Behavior is cooperative.         Thought Content: Thought content includes suicidal ideation.         Cognition and Memory: Cognition and memory normal.         Judgment: Judgment is impulsive.         Fluids    Intake/Output Summary (Last 24 hours) at 9/20/2023 1304  Last data filed at 9/20/2023 1000  Gross per 24 hour   Intake 450 ml   Output --   Net 450 ml       Laboratory  Recent Labs     09/18/23  1653 09/19/23  1444 09/20/23  0444   WBC 20.9* 16.7* 11.3*   RBC 5.34 4.05* 3.90*   HEMOGLOBIN 15.2 11.9* 11.3*   HEMATOCRIT 45.6 34.7* 33.4*   MCV 85.4 85.7 85.6   MCH 28.5 29.4 29.0   MCHC 33.3 34.3 33.8   RDW 41.9 42.2 42.3   PLATELETCT 246 216 218   MPV 9.1 9.0 8.9*     Recent Labs     09/18/23  1653 09/19/23  1307 09/20/23  0444   SODIUM 127* 134* 136   POTASSIUM 4.2 3.8 3.9   CHLORIDE 92* 102 105   CO2 21 24 23   GLUCOSE 115* 128* 96   BUN 19 15 12   CREATININE 1.13 0.68 0.69   CALCIUM 9.3 8.4* 8.9                   Imaging  CT-CTA CHEST PULMONARY ARTERY W/ RECONS   Final Result      1.   "There is no CT evidence of acute pulmonary embolism.   2.  There is right lower lobe pneumonia.   3.  Additional groundglass opacities in the left lower lobe also consistent with probable pneumonia.   4.  Airspace disease with air bronchograms in the right middle lobe is probably due to collapse favored over pneumonia. No definite proximal endobronchial obstructive process is seen on CT.   5.  Soft tissue in the right hilum could be due to partial collapse of the right middle lobe. No measurable mass identified.   6.  There is a small dependent right pleural effusion.            [id number] (WITH 1-VIEW CXR) RIGHT   Final Result      1.  No displaced rib fracture.   2.  There is a right lower lobe pneumonia with a small amount of parapneumonic effusion.           Assessment/Plan  * Sepsis (HCC)- (present on admission)  Assessment & Plan  This is Sepsis Present on admission  SIRS criteria identified on my evaluation include: Tachypnea, with respirations greater than 20 per minute and Leukocytosis, with WBC greater than 12,000  Clinical indicators of end organ dysfunction include Lactic Acid greater than 2  Source is pneumonia  Sepsis protocol initiated  Crystalloid Fluid Administration: Fluid resuscitation ordered per standard protocol - 30 mL/kg per current or ideal body weight  IV antibiotics as appropriate for source of sepsis  Reassessment: I have reassessed the patient's hemodynamic status    Suicidal ideation- (present on admission)  Assessment & Plan  Patient tearful and told RN and MD separate interviews that he wants to \"end it all\".  Referring to his life.  Explained that he likely does not feel good from COVID 19 infection.  States he has been depressed x 3 years since his wife is gone.  Sitter 1:1  Legal hold initiated by myself.  Psychiatry consult placed.    Pneumonia due to COVID-19 virus- (present on admission)  Assessment & Plan  Ceftriaxone/Zithromax  Follow-up respiratory and blood " cultures  procalcitonin elevated 4.51.        Methamphetamine use (HCC)- (present on admission)  Assessment & Plan  Encouraged sobriety     Homeless- (present on admission)  Assessment & Plan  States lives in a shelter since wife  3 years ago in car accident.         VTE prophylaxis:    enoxaparin ppx      I have performed a physical exam and reviewed and updated ROS and Plan today (2023). In review of yesterday's note (2023), there are no changes except as documented above.

## 2023-09-21 PROBLEM — E53.8 VITAMIN B12 DEFICIENCY: Status: ACTIVE | Noted: 2023-09-21

## 2023-09-21 LAB
1,25(OH)2D3 SERPL-MCNC: 115 PG/ML (ref 19.9–79.3)
ANION GAP SERPL CALC-SCNC: 11 MMOL/L (ref 7–16)
BASOPHILS # BLD AUTO: 0.5 % (ref 0–1.8)
BASOPHILS # BLD: 0.04 K/UL (ref 0–0.12)
BUN SERPL-MCNC: 13 MG/DL (ref 8–22)
CALCIUM SERPL-MCNC: 9.1 MG/DL (ref 8.5–10.5)
CHLORIDE SERPL-SCNC: 103 MMOL/L (ref 96–112)
CO2 SERPL-SCNC: 24 MMOL/L (ref 20–33)
CREAT SERPL-MCNC: 0.68 MG/DL (ref 0.5–1.4)
EOSINOPHIL # BLD AUTO: 0.21 K/UL (ref 0–0.51)
EOSINOPHIL NFR BLD: 2.7 % (ref 0–6.9)
ERYTHROCYTE [DISTWIDTH] IN BLOOD BY AUTOMATED COUNT: 41.8 FL (ref 35.9–50)
GFR SERPLBLD CREATININE-BSD FMLA CKD-EPI: 110 ML/MIN/1.73 M 2
GLUCOSE SERPL-MCNC: 106 MG/DL (ref 65–99)
HCT VFR BLD AUTO: 34.8 % (ref 42–52)
HGB BLD-MCNC: 11.9 G/DL (ref 14–18)
IMM GRANULOCYTES # BLD AUTO: 0.08 K/UL (ref 0–0.11)
IMM GRANULOCYTES NFR BLD AUTO: 1 % (ref 0–0.9)
LYMPHOCYTES # BLD AUTO: 1.44 K/UL (ref 1–4.8)
LYMPHOCYTES NFR BLD: 18.3 % (ref 22–41)
MCH RBC QN AUTO: 29.5 PG (ref 27–33)
MCHC RBC AUTO-ENTMCNC: 34.2 G/DL (ref 32.3–36.5)
MCV RBC AUTO: 86.4 FL (ref 81.4–97.8)
MONOCYTES # BLD AUTO: 0.56 K/UL (ref 0–0.85)
MONOCYTES NFR BLD AUTO: 7.1 % (ref 0–13.4)
NEUTROPHILS # BLD AUTO: 5.53 K/UL (ref 1.82–7.42)
NEUTROPHILS NFR BLD: 70.4 % (ref 44–72)
NRBC # BLD AUTO: 0 K/UL
NRBC BLD-RTO: 0 /100 WBC (ref 0–0.2)
PLATELET # BLD AUTO: 265 K/UL (ref 164–446)
PMV BLD AUTO: 8.5 FL (ref 9–12.9)
POTASSIUM SERPL-SCNC: 4 MMOL/L (ref 3.6–5.5)
RBC # BLD AUTO: 4.03 M/UL (ref 4.7–6.1)
SODIUM SERPL-SCNC: 138 MMOL/L (ref 135–145)
TSH SERPL DL<=0.005 MIU/L-ACNC: 6.02 UIU/ML (ref 0.38–5.33)
VIT B12 SERPL-MCNC: 410 PG/ML (ref 211–911)
WBC # BLD AUTO: 7.9 K/UL (ref 4.8–10.8)

## 2023-09-21 PROCEDURE — 80048 BASIC METABOLIC PNL TOTAL CA: CPT

## 2023-09-21 PROCEDURE — 700102 HCHG RX REV CODE 250 W/ 637 OVERRIDE(OP): Performed by: STUDENT IN AN ORGANIZED HEALTH CARE EDUCATION/TRAINING PROGRAM

## 2023-09-21 PROCEDURE — 700101 HCHG RX REV CODE 250: Performed by: HOSPITALIST

## 2023-09-21 PROCEDURE — 700102 HCHG RX REV CODE 250 W/ 637 OVERRIDE(OP): Performed by: HOSPITALIST

## 2023-09-21 PROCEDURE — A9270 NON-COVERED ITEM OR SERVICE: HCPCS | Performed by: STUDENT IN AN ORGANIZED HEALTH CARE EDUCATION/TRAINING PROGRAM

## 2023-09-21 PROCEDURE — 99232 SBSQ HOSP IP/OBS MODERATE 35: CPT | Mod: GC | Performed by: PSYCHIATRY & NEUROLOGY

## 2023-09-21 PROCEDURE — 700111 HCHG RX REV CODE 636 W/ 250 OVERRIDE (IP): Performed by: HOSPITALIST

## 2023-09-21 PROCEDURE — 84443 ASSAY THYROID STIM HORMONE: CPT

## 2023-09-21 PROCEDURE — 85025 COMPLETE CBC W/AUTO DIFF WBC: CPT

## 2023-09-21 PROCEDURE — 770001 HCHG ROOM/CARE - MED/SURG/GYN PRIV*

## 2023-09-21 PROCEDURE — 99232 SBSQ HOSP IP/OBS MODERATE 35: CPT | Performed by: HOSPITALIST

## 2023-09-21 PROCEDURE — 82607 VITAMIN B-12: CPT

## 2023-09-21 PROCEDURE — 90832 PSYTX W PT 30 MINUTES: CPT | Performed by: SOCIAL WORKER

## 2023-09-21 PROCEDURE — A9270 NON-COVERED ITEM OR SERVICE: HCPCS | Performed by: HOSPITALIST

## 2023-09-21 RX ORDER — CYANOCOBALAMIN 1000 UG/ML
1000 INJECTION, SOLUTION INTRAMUSCULAR; SUBCUTANEOUS
Status: DISCONTINUED | OUTPATIENT
Start: 2023-09-21 | End: 2023-09-22

## 2023-09-21 RX ORDER — GUAIFENESIN 200 MG/10ML
10 LIQUID ORAL EVERY 4 HOURS PRN
Status: DISCONTINUED | OUTPATIENT
Start: 2023-09-21 | End: 2023-09-22 | Stop reason: HOSPADM

## 2023-09-21 RX ADMIN — RISPERIDONE 1 MG: 1 TABLET ORAL at 05:34

## 2023-09-21 RX ADMIN — ACETAMINOPHEN 650 MG: 325 TABLET, FILM COATED ORAL at 10:06

## 2023-09-21 RX ADMIN — Medication: at 20:12

## 2023-09-21 RX ADMIN — RISPERIDONE 1 MG: 1 TABLET ORAL at 17:46

## 2023-09-21 RX ADMIN — CEFTRIAXONE SODIUM 1000 MG: 10 INJECTION, POWDER, FOR SOLUTION INTRAVENOUS at 05:34

## 2023-09-21 RX ADMIN — CYANOCOBALAMIN 1000 MCG: 1000 INJECTION, SOLUTION INTRAMUSCULAR; SUBCUTANEOUS at 14:16

## 2023-09-21 RX ADMIN — Medication: at 09:54

## 2023-09-21 RX ADMIN — OXYCODONE HYDROCHLORIDE AND ACETAMINOPHEN 500 MG: 500 TABLET ORAL at 05:34

## 2023-09-21 ASSESSMENT — ENCOUNTER SYMPTOMS
SORE THROAT: 0
HEADACHES: 0
ABDOMINAL PAIN: 0
BACK PAIN: 0
PALPITATIONS: 0
NAUSEA: 0
WHEEZING: 0
CLAUDICATION: 0
DIZZINESS: 0
FEVER: 0
NECK PAIN: 0
HEMOPTYSIS: 0
EYE DISCHARGE: 0
SPUTUM PRODUCTION: 1
COUGH: 0
COUGH: 1
FOCAL WEAKNESS: 0
SENSORY CHANGE: 0
DIAPHORESIS: 0
DEPRESSION: 1
SHORTNESS OF BREATH: 0
SPEECH CHANGE: 0
EYE PAIN: 0
VOMITING: 0
CONSTIPATION: 0
LOSS OF CONSCIOUSNESS: 0
WEAKNESS: 0
BRUISES/BLEEDS EASILY: 0
DIARRHEA: 0
CHILLS: 1
MYALGIAS: 0
SHORTNESS OF BREATH: 1

## 2023-09-21 ASSESSMENT — LIFESTYLE VARIABLES: SUBSTANCE_ABUSE: 0

## 2023-09-21 ASSESSMENT — PAIN DESCRIPTION - PAIN TYPE: TYPE: ACUTE PAIN
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
## 2023-09-21 NOTE — CARE PLAN
The patient is Watcher - Medium risk of patient condition declining or worsening    Shift Goals  Clinical Goals: pain management  Patient Goals: comfort  Family Goals: KYLEIGH    Progress made toward(s) clinical / shift goals:  Pt updated and agreeable to plan of care. Pt denies pain throughout shift.     Patient is not progressing towards the following goals:

## 2023-09-21 NOTE — PSYCHIATRY
"PSYCHIATRIC FOLLOW-UP:(established)  *Reason for admission: COVID Pneumonia     *Legal Hold Status:  extended          Chart reviewed.         HPI: Patient reports he did not sleep well, noting it was \"up-and-down\".  Patient reports this is normal for him.  Patient reports reason for not sleeping includes a warm room and people interrupting him during the night.  Patient also reported nightmares.  Patient reports he has not taken medication in the past for nightmares.  Patient denied side effects from Risperdal started yesterday.  Patient denied SI/HI.  Patient denied AVH.  Patient reported last AVH was 1 week ago.  When asked if patient has a normal appetite, patient responded \"when his breakfast coming\".  Patient was periodically agitated about not having breakfast sooner.  When asked if patient had headaches, patient responded \"only you\".  Patient denied additional questions or concerns at this time.    Medical ROS (as pertinent):     Review of Systems   Respiratory:  Negative for cough and shortness of breath.    Cardiovascular:  Negative for chest pain.   Gastrointestinal:  Negative for constipation and diarrhea.   Neurological:  Negative for headaches.           Psychiatric Examination:  Vitals:   Vitals:    09/21/23 0336   BP: 96/60   Pulse: 73   Resp: 18   Temp: 36.5 °C (97.7 °F)   SpO2: 94%      Appearance: appears stated age, fair grooming and hygiene, agitated at times, mostly cooperative, good eye contact  Abnormal movements: none  Gait/posture: Gait not assessed  Speech: normal volume, tone and rhythm  Though process: linear and organized  Associations: no loose associations  Thought content: not observed responding to internal stimuli, denies paranoia, delusional content not present during interview  Judgement and Insight: fair/limited  Orientation:oriented to person, place, time and situation  Recent and Remote Memory: grossly intact  Attention Span and Concentration: grossly intact  Language: " "English, fluent  Fund of Knowledge: not tested   Mood and Affect: \"fine\", restricted affect  SI/HI:denies any active or passive SI/HI       EK23,   Brain Imaging: none for this admission  EEG:  none for this admissoin       Labs personally reviewed   Recent Results (from the past 24 hour(s))   URINE DRUG SCREEN    Collection Time: 23  3:19 PM   Result Value Ref Range    Amphetamines Urine Positive (A) Negative    Barbiturates Negative Negative    Benzodiazepines Negative Negative    Cocaine Metabolite Negative Negative    Fentanyl, Urine Negative Negative    Methadone Negative Negative    Opiates Negative Negative    Oxycodone Negative Negative    Phencyclidine -Pcp Negative Negative    Propoxyphene Negative Negative    Cannabinoid Metab Negative Negative   CBC WITH DIFFERENTIAL    Collection Time: 23  3:33 AM   Result Value Ref Range    WBC 7.9 4.8 - 10.8 K/uL    RBC 4.03 (L) 4.70 - 6.10 M/uL    Hemoglobin 11.9 (L) 14.0 - 18.0 g/dL    Hematocrit 34.8 (L) 42.0 - 52.0 %    MCV 86.4 81.4 - 97.8 fL    MCH 29.5 27.0 - 33.0 pg    MCHC 34.2 32.3 - 36.5 g/dL    RDW 41.8 35.9 - 50.0 fL    Platelet Count 265 164 - 446 K/uL    MPV 8.5 (L) 9.0 - 12.9 fL    Neutrophils-Polys 70.40 44.00 - 72.00 %    Lymphocytes 18.30 (L) 22.00 - 41.00 %    Monocytes 7.10 0.00 - 13.40 %    Eosinophils 2.70 0.00 - 6.90 %    Basophils 0.50 0.00 - 1.80 %    Immature Granulocytes 1.00 (H) 0.00 - 0.90 %    Nucleated RBC 0.00 0.00 - 0.20 /100 WBC    Neutrophils (Absolute) 5.53 1.82 - 7.42 K/uL    Lymphs (Absolute) 1.44 1.00 - 4.80 K/uL    Monos (Absolute) 0.56 0.00 - 0.85 K/uL    Eos (Absolute) 0.21 0.00 - 0.51 K/uL    Baso (Absolute) 0.04 0.00 - 0.12 K/uL    Immature Granulocytes (abs) 0.08 0.00 - 0.11 K/uL    NRBC (Absolute) 0.00 K/uL   Basic Metabolic Panel    Collection Time: 23  3:33 AM   Result Value Ref Range    Sodium 138 135 - 145 mmol/L    Potassium 4.0 3.6 - 5.5 mmol/L    Chloride 103 96 - 112 mmol/L    Co2 " 24 20 - 33 mmol/L    Glucose 106 (H) 65 - 99 mg/dL    Bun 13 8 - 22 mg/dL    Creatinine 0.68 0.50 - 1.40 mg/dL    Calcium 9.1 8.5 - 10.5 mg/dL    Anion Gap 11.0 7.0 - 16.0   TSH    Collection Time: 09/21/23  3:33 AM   Result Value Ref Range    TSH 6.020 (H) 0.380 - 5.330 uIU/mL   VITAMIN B12    Collection Time: 09/21/23  3:33 AM   Result Value Ref Range    Vitamin B12 -True Cobalamin 410 211 - 911 pg/mL   ESTIMATED GFR    Collection Time: 09/21/23  3:33 AM   Result Value Ref Range    GFR (CKD-EPI) 110 >60 mL/min/1.73 m 2       Medications  Current Facility-Administered Medications   Medication Dose Route Frequency Provider Last Rate Last Admin    cyanocobalamin (Vitamin B-12) injection 1,000 mcg  1,000 mcg Intramuscular Q30 DAYS Mariia Khalil M.D.        cefTRIAXone (Rocephin) syringe 1,000 mg  1,000 mg Intravenous Q24HRS Mariia Khalil M.D.   1,000 mg at 09/21/23 0534    risperiDONE (RisperDAL) tablet 1 mg  1 mg Oral BID Mariia Khalil M.D.   1 mg at 09/21/23 0534    petrolatum 42 % ointment   Topical TID Mariia Khalil M.D.   Given at 09/21/23 0954    ascorbic acid (Vitamin C) tablet 500 mg  500 mg Oral DAILY Mariia Khalil M.D.   500 mg at 09/21/23 0534    senna-docusate (Pericolace Or Senokot S) 8.6-50 MG per tablet 2 Tablet  2 Tablet Oral BID Duncan Gonsalez M.D.        And    polyethylene glycol/lytes (Miralax) PACKET 1 Packet  1 Packet Oral QDAY ANTHONY Gonsalez M.D.        And    magnesium hydroxide (Milk Of Magnesia) suspension 30 mL  30 mL Oral QDAY ANTHONY Gonsalez M.D.        And    bisacodyl (Dulcolax) suppository 10 mg  10 mg Rectal QDAY ANTHONY Gonsalez M.D.        enoxaparin (Lovenox) inj 40 mg  40 mg Subcutaneous DAILY AT 1800 Duncan Gonsalez M.D.   40 mg at 09/19/23 1805    acetaminophen (Tylenol) tablet 650 mg  650 mg Oral Q6HRS PRN Duncan Gonsalez M.D.   650 mg at 09/21/23 1006       Assessment: Today patient was last labile compared to yesterday's interview.  Patient was slightly agitated  due to hunger.  Patient denied side effects from Risperdal.  Patient denied AVH, paranoia and delusions were not apparent during interview.  Patient's psychosis appears to be improving.  Labs came back, TSH was elevated.  UDS was positive for amphetamines.  Has grief from losing wife 3 years ago, psychotherapy planned for tomorrow.      Dx:  #Primary schizophrenia vs substance induced psychosis  #Stimulant use disorder, amphetamine type - current  #R/o stimulant withdrawal  #Cannabis use disorder  #MDD vs prolonged grief    Medical :  - see medical note      Plan:  - Legal hold: extended  - Psychotropic medications   - Continue Risperdal 1mg PO BID for psychosis   - Will consider anti-depressant, such as Prozac, based on patient's tolerability of the Risperdal  - Please transfer pt to inpatient psychiatric hospital when medically cleared and bed is available  - Labs reviewed including UDS, TSH, B12  - EKG reviewed  - Discussed the case with: Dr. Mcelroy and voalte message sent to Dr. Khalil  - Consult placed for psychotherapy to start tomorrow  - Psychiatry will follow up    Thank you for the consult.     Sitter: telesitter  Phone: no  Visitors: no  Personal belongings: no    This note was created using voice recognition software (Dragon). The accuracy of the dictation is limited by the abilities of the software. I have reviewed the note prior to signing. However, error related to voice recognition software and /or scribes may still exist and should be interpreted within the appropriate context.

## 2023-09-21 NOTE — PROGRESS NOTES
Hospital Medicine Daily Progress Note    Date of Service  9/21/2023    Chief Complaint  Dio Ramirez is a 53 y.o. male admitted 9/18/2023 with SOB, cough, chest pain, CXR with pneumonia found to have COVID 19.    Hospital Course  Dio Ramirez is a 53 y.o. male past medical history of homelessness, methamphetamine abuse who presented 9/18/2023 with left-sided chest pain worse with breathing that started earlier this morning.  Denies any trauma, fever or chills.  Admits to using methamphetamine.  In the ER, CT of the chest revealed bilateral pneumonia started on broad-spectrum antibiotics.      Interval Problem Update  9/19: ordered COVID 19 testing, +, stopped Rocephin, check procalcitonin.  Started decadron 6mg po daily x 10 days.  ISS.  Patient tearful telling me he just wants to end it all.  Referring to his life.  He states he is unable to recover from losing his wife 3 years ago.  He also told the nurse the same.  No specific plan of suicide, just generalized statements.  Legal hold initiated by me.  Psychiatry consulted and charge RN aware needs sitter.  9/20:  remains depressed about his wife's death 3 years ago.  Pending psychiatry evaluation.  On 1:1 sitter hold, continues with some left sided chest pain from pneumonia. Procalcitonin elevated at 4.51, complete 5 days Rocephin IV, zithromax x 3 days.  On RA.  9/21:  added vitamin B12 IM for low B12 level 410. On RA.  Wbc normalized, mood improved today, joking.    I have discussed this patient's plan of care and discharge plan at IDT rounds today with Case Management, Nursing, Nursing leadership, and other members of the IDT team.    Consultants/Specialty  psychiatry    Code Status  Full Code    Disposition  The patient is not medically cleared for discharge to home or a post-acute facility.  Anticipate discharge to: a psychiatric hospital    I have placed the appropriate orders for post-discharge needs.    Review of Systems  Review of Systems    Constitutional:  Positive for chills and malaise/fatigue. Negative for diaphoresis and fever.   HENT:  Negative for congestion and sore throat.    Eyes:  Negative for pain and discharge.   Respiratory:  Positive for cough, sputum production and shortness of breath. Negative for hemoptysis and wheezing.    Cardiovascular:  Negative for chest pain, palpitations, claudication and leg swelling.   Gastrointestinal:  Negative for abdominal pain, constipation, diarrhea, melena, nausea and vomiting.   Genitourinary:  Negative for dysuria, frequency and urgency.   Musculoskeletal:  Negative for back pain, joint pain, myalgias and neck pain.   Skin:  Negative for itching and rash.   Neurological:  Negative for dizziness, sensory change, speech change, focal weakness, loss of consciousness, weakness and headaches.   Endo/Heme/Allergies:  Does not bruise/bleed easily.   Psychiatric/Behavioral:  Positive for depression and suicidal ideas. Negative for substance abuse.         Physical Exam  Temp:  [36.4 °C (97.5 °F)-36.9 °C (98.4 °F)] 36.4 °C (97.5 °F)  Pulse:  [71-73] 71  Resp:  [18] 18  BP: ()/(55-67) 101/67  SpO2:  [94 %-96 %] 96 %    Physical Exam  Vitals and nursing note reviewed.   Constitutional:       General: He is not in acute distress.     Appearance: He is ill-appearing. He is not diaphoretic.   HENT:      Head: Normocephalic and atraumatic.      Mouth/Throat:      Pharynx: No oropharyngeal exudate.   Eyes:      General: No scleral icterus.        Right eye: No discharge.         Left eye: No discharge.      Conjunctiva/sclera: Conjunctivae normal.      Pupils: Pupils are equal, round, and reactive to light.   Neck:      Thyroid: No thyromegaly.      Vascular: No JVD.      Trachea: No tracheal deviation.   Cardiovascular:      Rate and Rhythm: Normal rate and regular rhythm.      Heart sounds: Normal heart sounds. No murmur heard.     No friction rub. No gallop.   Pulmonary:      Effort: Pulmonary effort is  normal. No respiratory distress.      Breath sounds: Normal breath sounds. No wheezing or rales.   Chest:      Chest wall: No tenderness.   Abdominal:      General: Bowel sounds are normal. There is no distension.      Palpations: Abdomen is soft. There is no mass.      Tenderness: There is no abdominal tenderness. There is no guarding or rebound.   Musculoskeletal:         General: No tenderness. Normal range of motion.      Cervical back: Normal range of motion and neck supple.   Lymphadenopathy:      Cervical: No cervical adenopathy.   Skin:     General: Skin is warm and dry.      Findings: No erythema or rash.   Neurological:      General: No focal deficit present.      Mental Status: He is alert and oriented to person, place, and time.      Cranial Nerves: No cranial nerve deficit.      Motor: No abnormal muscle tone.   Psychiatric:         Attention and Perception: He is inattentive.         Mood and Affect: Mood is depressed. Affect is tearful.         Speech: Speech is rapid and pressured.         Behavior: Behavior normal. Behavior is cooperative.         Thought Content: Thought content includes suicidal ideation.         Cognition and Memory: Cognition and memory normal.         Judgment: Judgment is impulsive.         Fluids    Intake/Output Summary (Last 24 hours) at 9/21/2023 1611  Last data filed at 9/20/2023 2123  Gross per 24 hour   Intake 240 ml   Output --   Net 240 ml       Laboratory  Recent Labs     09/19/23  1444 09/20/23  0444 09/21/23  0333   WBC 16.7* 11.3* 7.9   RBC 4.05* 3.90* 4.03*   HEMOGLOBIN 11.9* 11.3* 11.9*   HEMATOCRIT 34.7* 33.4* 34.8*   MCV 85.7 85.6 86.4   MCH 29.4 29.0 29.5   MCHC 34.3 33.8 34.2   RDW 42.2 42.3 41.8   PLATELETCT 216 218 265   MPV 9.0 8.9* 8.5*     Recent Labs     09/19/23  1307 09/20/23  0444 09/21/23  0333   SODIUM 134* 136 138   POTASSIUM 3.8 3.9 4.0   CHLORIDE 102 105 103   CO2 24 23 24   GLUCOSE 128* 96 106*   BUN 15 12 13   CREATININE 0.68 0.69 0.68  "  CALCIUM 8.4* 8.9 9.1                   Imaging  CT-CTA CHEST PULMONARY ARTERY W/ RECONS   Final Result      1.  There is no CT evidence of acute pulmonary embolism.   2.  There is right lower lobe pneumonia.   3.  Additional groundglass opacities in the left lower lobe also consistent with probable pneumonia.   4.  Airspace disease with air bronchograms in the right middle lobe is probably due to collapse favored over pneumonia. No definite proximal endobronchial obstructive process is seen on CT.   5.  Soft tissue in the right hilum could be due to partial collapse of the right middle lobe. No measurable mass identified.   6.  There is a small dependent right pleural effusion.            [id number] (WITH 1-VIEW CXR) RIGHT   Final Result      1.  No displaced rib fracture.   2.  There is a right lower lobe pneumonia with a small amount of parapneumonic effusion.           Assessment/Plan  * Sepsis (HCC)- (present on admission)  Assessment & Plan  This is Sepsis Present on admission  SIRS criteria identified on my evaluation include: Tachypnea, with respirations greater than 20 per minute and Leukocytosis, with WBC greater than 12,000  Clinical indicators of end organ dysfunction include Lactic Acid greater than 2  Source is pneumonia  Sepsis protocol initiated  Crystalloid Fluid Administration: Fluid resuscitation ordered per standard protocol - 30 mL/kg per current or ideal body weight  IV antibiotics as appropriate for source of sepsis  Reassessment: I have reassessed the patient's hemodynamic status    Vitamin B12 deficiency  Assessment & Plan  410  9/21 given 1000 mcg IM q 30 days.    Suicidal ideation- (present on admission)  Assessment & Plan  Patient tearful and told RN and MD separate interviews that he wants to \"end it all\".  Referring to his life.  Explained that he likely does not feel good from COVID 19 infection.  States he has been depressed x 3 years since his wife is gone.  Sitter " 1:1  Legal hold initiated by myself.  Psychiatry consult placed.  :  Improved mood with risperdal 1mg po bid.  Psychotherapy helping.    Pneumonia due to COVID-19 virus- (present on admission)  Assessment & Plan  Ceftriaxone/Zithromax  Follow-up respiratory and blood cultures  procalcitonin elevated 4.51.        Methamphetamine use (HCC)- (present on admission)  Assessment & Plan  Encouraged sobriety     Homeless- (present on admission)  Assessment & Plan  States lives in a shelter since wife  3 years ago in car accident.         VTE prophylaxis:    enoxaparin ppx      I have performed a physical exam and reviewed and updated ROS and Plan today (2023). In review of yesterday's note (2023), there are no changes except as documented above.

## 2023-09-21 NOTE — CARE PLAN
Problem: Pain - Standard  Goal: Alleviation of pain or a reduction in pain to the patient’s comfort goal  Outcome: Progressing     Problem: Knowledge Deficit - Standard  Goal: Patient and family/care givers will demonstrate understanding of plan of care, disease process/condition, diagnostic tests and medications  Outcome: Progressing     Problem: Hemodynamics  Goal: Patient's hemodynamics, fluid balance and neurologic status will be stable or improve  Outcome: Progressing     Problem: Fluid Volume  Goal: Fluid volume balance will be maintained  Outcome: Progressing     Problem: Urinary - Renal Perfusion  Goal: Ability to achieve and maintain adequate renal perfusion and functioning will improve  Outcome: Progressing     Problem: Respiratory  Goal: Patient will achieve/maintain optimum respiratory ventilation and gas exchange  Outcome: Progressing     Problem: Mechanical Ventilation  Goal: Safe management of artificial airway and ventilation  Outcome: Progressing  Goal: Successful weaning off mechanical ventilator, spontaneously maintains adequate gas exchange  Outcome: Progressing  Goal: Patient will be able to express needs and understand communication  Outcome: Progressing     Problem: Physical Regulation  Goal: Diagnostic test results will improve  Outcome: Progressing  Goal: Signs and symptoms of infection will decrease  Outcome: Progressing   The patient is Watcher - Medium risk of patient condition declining or worsening    Shift Goals  Clinical Goals: pain management  Patient Goals: comfort  Family Goals: KYLEIGH    Progress made toward(s) clinical / shift goals:  Patient is resting in bed, bed is locked and in lowest position, call bell within reach of patient. Sitter at patient's bedside, patient aware why he has a sitter and why he is on a legal hold.    Patient is not progressing towards the following goals:

## 2023-09-21 NOTE — PROGRESS NOTES
Tele sitter at bedside. Updated monitor staff on pt precautions and that pt needs staff member with him before getting out of bed.

## 2023-09-21 NOTE — PROGRESS NOTES
Sitter order updated to tele sitter. Pt denies suicidal ideation or any wish or plan to hurt himself. Pt agreeable to plan to have tele sitter in the room to monitor.

## 2023-09-22 VITALS
OXYGEN SATURATION: 98 % | TEMPERATURE: 97.8 F | DIASTOLIC BLOOD PRESSURE: 69 MMHG | WEIGHT: 129.85 LBS | BODY MASS INDEX: 19.68 KG/M2 | HEART RATE: 83 BPM | HEIGHT: 68 IN | SYSTOLIC BLOOD PRESSURE: 127 MMHG | RESPIRATION RATE: 16 BRPM

## 2023-09-22 PROBLEM — A41.9 SEPSIS (HCC): Status: RESOLVED | Noted: 2023-09-18 | Resolved: 2023-09-22

## 2023-09-22 PROBLEM — F43.10 PTSD (POST-TRAUMATIC STRESS DISORDER): Status: ACTIVE | Noted: 2023-09-22

## 2023-09-22 PROBLEM — F33.9 MDD (MAJOR DEPRESSIVE DISORDER), RECURRENT EPISODE (HCC): Status: ACTIVE | Noted: 2023-09-19

## 2023-09-22 PROBLEM — E03.4 HYPOTHYROIDISM DUE TO ACQUIRED ATROPHY OF THYROID: Status: ACTIVE | Noted: 2023-09-22

## 2023-09-22 LAB
ANION GAP SERPL CALC-SCNC: 13 MMOL/L (ref 7–16)
BASOPHILS # BLD AUTO: 0.6 % (ref 0–1.8)
BASOPHILS # BLD: 0.05 K/UL (ref 0–0.12)
BUN SERPL-MCNC: 13 MG/DL (ref 8–22)
CALCIUM SERPL-MCNC: 9.1 MG/DL (ref 8.5–10.5)
CHLORIDE SERPL-SCNC: 103 MMOL/L (ref 96–112)
CO2 SERPL-SCNC: 22 MMOL/L (ref 20–33)
CREAT SERPL-MCNC: 0.68 MG/DL (ref 0.5–1.4)
EOSINOPHIL # BLD AUTO: 0.22 K/UL (ref 0–0.51)
EOSINOPHIL NFR BLD: 2.5 % (ref 0–6.9)
ERYTHROCYTE [DISTWIDTH] IN BLOOD BY AUTOMATED COUNT: 41.6 FL (ref 35.9–50)
GFR SERPLBLD CREATININE-BSD FMLA CKD-EPI: 110 ML/MIN/1.73 M 2
GLUCOSE SERPL-MCNC: 102 MG/DL (ref 65–99)
HCT VFR BLD AUTO: 38.3 % (ref 42–52)
HGB BLD-MCNC: 12.8 G/DL (ref 14–18)
IMM GRANULOCYTES # BLD AUTO: 0.14 K/UL (ref 0–0.11)
IMM GRANULOCYTES NFR BLD AUTO: 1.6 % (ref 0–0.9)
LYMPHOCYTES # BLD AUTO: 1.44 K/UL (ref 1–4.8)
LYMPHOCYTES NFR BLD: 16.3 % (ref 22–41)
MCH RBC QN AUTO: 28.4 PG (ref 27–33)
MCHC RBC AUTO-ENTMCNC: 33.4 G/DL (ref 32.3–36.5)
MCV RBC AUTO: 85.1 FL (ref 81.4–97.8)
MONOCYTES # BLD AUTO: 0.83 K/UL (ref 0–0.85)
MONOCYTES NFR BLD AUTO: 9.4 % (ref 0–13.4)
NEUTROPHILS # BLD AUTO: 6.18 K/UL (ref 1.82–7.42)
NEUTROPHILS NFR BLD: 69.6 % (ref 44–72)
NRBC # BLD AUTO: 0 K/UL
NRBC BLD-RTO: 0 /100 WBC (ref 0–0.2)
PLATELET # BLD AUTO: 304 K/UL (ref 164–446)
PMV BLD AUTO: 8.5 FL (ref 9–12.9)
POTASSIUM SERPL-SCNC: 4.1 MMOL/L (ref 3.6–5.5)
RBC # BLD AUTO: 4.5 M/UL (ref 4.7–6.1)
SODIUM SERPL-SCNC: 138 MMOL/L (ref 135–145)
WBC # BLD AUTO: 8.9 K/UL (ref 4.8–10.8)

## 2023-09-22 PROCEDURE — 99239 HOSP IP/OBS DSCHRG MGMT >30: CPT | Performed by: HOSPITALIST

## 2023-09-22 PROCEDURE — 700102 HCHG RX REV CODE 250 W/ 637 OVERRIDE(OP): Performed by: HOSPITALIST

## 2023-09-22 PROCEDURE — 700111 HCHG RX REV CODE 636 W/ 250 OVERRIDE (IP): Performed by: HOSPITALIST

## 2023-09-22 PROCEDURE — RXMED WILLOW AMBULATORY MEDICATION CHARGE: Performed by: HOSPITALIST

## 2023-09-22 PROCEDURE — 90832 PSYTX W PT 30 MINUTES: CPT | Performed by: SOCIAL WORKER

## 2023-09-22 PROCEDURE — 80048 BASIC METABOLIC PNL TOTAL CA: CPT

## 2023-09-22 PROCEDURE — 99232 SBSQ HOSP IP/OBS MODERATE 35: CPT | Mod: GC | Performed by: PSYCHIATRY & NEUROLOGY

## 2023-09-22 PROCEDURE — 85025 COMPLETE CBC W/AUTO DIFF WBC: CPT

## 2023-09-22 PROCEDURE — 700101 HCHG RX REV CODE 250: Performed by: HOSPITALIST

## 2023-09-22 PROCEDURE — A9270 NON-COVERED ITEM OR SERVICE: HCPCS | Performed by: HOSPITALIST

## 2023-09-22 RX ORDER — RISPERIDONE 1 MG/1
1 TABLET ORAL
Qty: 30 TABLET | Refills: 0 | Status: ON HOLD | OUTPATIENT
Start: 2023-09-22 | End: 2023-10-23 | Stop reason: SDUPTHER

## 2023-09-22 RX ORDER — CHOLECALCIFEROL (VITAMIN D3) 125 MCG
1000 CAPSULE ORAL DAILY
Status: DISCONTINUED | OUTPATIENT
Start: 2023-09-22 | End: 2023-09-22 | Stop reason: HOSPADM

## 2023-09-22 RX ORDER — SERTRALINE HYDROCHLORIDE 25 MG/1
25 TABLET, FILM COATED ORAL DAILY
Qty: 30 TABLET | Refills: 0 | Status: ON HOLD | OUTPATIENT
Start: 2023-09-22 | End: 2023-10-23 | Stop reason: SDUPTHER

## 2023-09-22 RX ORDER — LEVOTHYROXINE SODIUM 0.03 MG/1
25 TABLET ORAL
Status: DISCONTINUED | OUTPATIENT
Start: 2023-09-22 | End: 2023-09-22 | Stop reason: HOSPADM

## 2023-09-22 RX ORDER — LEVOTHYROXINE SODIUM 0.03 MG/1
25 TABLET ORAL
Qty: 30 TABLET | Refills: 0 | Status: ON HOLD | OUTPATIENT
Start: 2023-09-23 | End: 2023-10-23 | Stop reason: SDUPTHER

## 2023-09-22 RX ADMIN — GUAIFENESIN 200 MG: 100 SOLUTION ORAL at 00:34

## 2023-09-22 RX ADMIN — CEFTRIAXONE SODIUM 1000 MG: 10 INJECTION, POWDER, FOR SOLUTION INTRAVENOUS at 05:24

## 2023-09-22 RX ADMIN — LEVOTHYROXINE SODIUM 25 MCG: 25 TABLET ORAL at 09:20

## 2023-09-22 RX ADMIN — RISPERIDONE 1 MG: 1 TABLET ORAL at 05:24

## 2023-09-22 RX ADMIN — CYANOCOBALAMIN TAB 500 MCG 1000 MCG: 500 TAB at 09:19

## 2023-09-22 RX ADMIN — OXYCODONE HYDROCHLORIDE AND ACETAMINOPHEN 500 MG: 500 TABLET ORAL at 05:24

## 2023-09-22 ASSESSMENT — PAIN DESCRIPTION - PAIN TYPE
TYPE: ACUTE PAIN

## 2023-09-22 ASSESSMENT — ENCOUNTER SYMPTOMS
MYALGIAS: 0
SHORTNESS OF BREATH: 0
HEADACHES: 0
INSOMNIA: 1
DEPRESSION: 0

## 2023-09-22 NOTE — CARE PLAN
The patient is Stable - Low risk of patient condition declining or worsening    Shift Goals  Clinical Goals: Discharge  Patient Goals: sleep  Family Goals: KYLEIGH    Progress made toward(s) clinical / shift goals:  Patient discharge in home today.    Patient is not progressing towards the following goals:

## 2023-09-22 NOTE — PROGRESS NOTES
Rec'd report from day shift RN. Assumed pt care. Assessment completed. AA&OX4. Denies pain at this time. No s/s of discomfort or distress. Telesitter in place for legal hold Pt ambulated to the bathroom and maintained steady gait (stayed with pt when he went to the bathroom). Bed in lowest position, bed locked, treaded socks in place, RN and CNA numbers provided, call light within reach.

## 2023-09-22 NOTE — PROGRESS NOTES
Patient is being discharged home from the discharge lounge. Patient is A&Ox4. IV removed. Discharge instructions provided to patient and reviewed. Patient verbalized understanding and all questions and concerns were addressed. Patient waiting meds to beds in [facility].

## 2023-09-22 NOTE — CONSULTS
"PSYCHIATRIC FOLLOW-UP:(established)  *Reason for admission:  COVID Pneumonia      *Legal Hold Status:  Discontinued          Chart reviewed.         HPI: When this author entered the room, patient reported he was hungry and wanted to eat.  Patient had prior psychotherapy session, revealing trauma.  Patient reports at times he has nightmares, hypervigilance, intrusive thoughts, flashbacks related to the trauma.  Patient was agreeable to starting Zoloft today for symptoms.  Patient reported therapy was slightly helpful.  Patient denied AVH, SI HI.  Multiple times during the interview patient interrupted to state he was hungry and wanted food.  Patient denied side effects from Risperdal.     Medical ROS (as pertinent):     Review of Systems   Respiratory:  Negative for shortness of breath.    Musculoskeletal:  Negative for myalgias.   Neurological:  Negative for headaches.   Psychiatric/Behavioral:  Negative for depression. The patient has insomnia.            Psychiatric Examination:  Vitals:   Vitals:    23 0904   BP: 127/69   Pulse: 83   Resp: 16   Temp: 36.6 °C (97.8 °F)   SpO2: 98%      Appearance: appears stated age, fair grooming and hygiene, good eye contact, agitated and wanted food  Abnormal movements: none  Gait/posture: Did not assess gait  Speech: normal volume, irritated tone and normal rhythm  Though process: linear and organized  Associations: no loose associations  Thought content: not observed responding to internal stimuli, denies paranoia, delusional content not present during interview  Judgement and Insight: fair/fair  Orientation:oriented to person, place, time and situation  Recent and Remote Memory: intact  Attention Span and Concentration: intact  Language: English, fluent  Fund of Knowledge: not tested   Mood and Affect: \"Im hungry\", agitated  SI/HI:denies any active or passive SI/HI     EK23,   Brain Imaging: none for this admission  EEG:  none for this admissoin     "   Labs personally reviewed   Recent Results (from the past 24 hour(s))   CBC WITH DIFFERENTIAL    Collection Time: 09/22/23  5:15 AM   Result Value Ref Range    WBC 8.9 4.8 - 10.8 K/uL    RBC 4.50 (L) 4.70 - 6.10 M/uL    Hemoglobin 12.8 (L) 14.0 - 18.0 g/dL    Hematocrit 38.3 (L) 42.0 - 52.0 %    MCV 85.1 81.4 - 97.8 fL    MCH 28.4 27.0 - 33.0 pg    MCHC 33.4 32.3 - 36.5 g/dL    RDW 41.6 35.9 - 50.0 fL    Platelet Count 304 164 - 446 K/uL    MPV 8.5 (L) 9.0 - 12.9 fL    Neutrophils-Polys 69.60 44.00 - 72.00 %    Lymphocytes 16.30 (L) 22.00 - 41.00 %    Monocytes 9.40 0.00 - 13.40 %    Eosinophils 2.50 0.00 - 6.90 %    Basophils 0.60 0.00 - 1.80 %    Immature Granulocytes 1.60 (H) 0.00 - 0.90 %    Nucleated RBC 0.00 0.00 - 0.20 /100 WBC    Neutrophils (Absolute) 6.18 1.82 - 7.42 K/uL    Lymphs (Absolute) 1.44 1.00 - 4.80 K/uL    Monos (Absolute) 0.83 0.00 - 0.85 K/uL    Eos (Absolute) 0.22 0.00 - 0.51 K/uL    Baso (Absolute) 0.05 0.00 - 0.12 K/uL    Immature Granulocytes (abs) 0.14 (H) 0.00 - 0.11 K/uL    NRBC (Absolute) 0.00 K/uL   Basic Metabolic Panel    Collection Time: 09/22/23  5:15 AM   Result Value Ref Range    Sodium 138 135 - 145 mmol/L    Potassium 4.1 3.6 - 5.5 mmol/L    Chloride 103 96 - 112 mmol/L    Co2 22 20 - 33 mmol/L    Glucose 102 (H) 65 - 99 mg/dL    Bun 13 8 - 22 mg/dL    Creatinine 0.68 0.50 - 1.40 mg/dL    Calcium 9.1 8.5 - 10.5 mg/dL    Anion Gap 13.0 7.0 - 16.0   ESTIMATED GFR    Collection Time: 09/22/23  5:15 AM   Result Value Ref Range    GFR (CKD-EPI) 110 >60 mL/min/1.73 m 2       Medications  Current Facility-Administered Medications   Medication Dose Route Frequency Provider Last Rate Last Admin    levothyroxine (Synthroid) tablet 25 mcg  25 mcg Oral AM ES Mariia Khalil M.D.   25 mcg at 09/22/23 0920    cyanocobalamin (Vitamin B-12) tablet 1,000 mcg  1,000 mcg Oral DAILY Mariia Khalil M.D.   1,000 mcg at 09/22/23 0919    guaiFENesin (Robitussin) 100 MG/5ML liquid 200 mg  10 mL Oral  Q4HRS PRN Mariia Khalil M.D.   200 mg at 09/22/23 0034    risperiDONE (RisperDAL) tablet 1 mg  1 mg Oral BID Mariia Khalil M.D.   1 mg at 09/22/23 0524    petrolatum 42 % ointment   Topical TID Mariia Khalil M.D.   Given at 09/21/23 2012    ascorbic acid (Vitamin C) tablet 500 mg  500 mg Oral DAILY Mariia Khalil M.D.   500 mg at 09/22/23 0524    senna-docusate (Pericolace Or Senokot S) 8.6-50 MG per tablet 2 Tablet  2 Tablet Oral BID Duncan Gonsalez M.D.        And    polyethylene glycol/lytes (Miralax) PACKET 1 Packet  1 Packet Oral QDAY KAITYN Duncan Gonsalez M.D.        And    magnesium hydroxide (Milk Of Magnesia) suspension 30 mL  30 mL Oral QDAY ANTHONY Gonsalez M.D.        And    bisacodyl (Dulcolax) suppository 10 mg  10 mg Rectal QDAY ANTHONY Gonsalez M.D.        enoxaparin (Lovenox) inj 40 mg  40 mg Subcutaneous DAILY AT 1800 Duncan Gonsalez M.D.   40 mg at 09/19/23 1805    acetaminophen (Tylenol) tablet 650 mg  650 mg Oral Q6HRS PRN Duncan Gonsalez M.D.   650 mg at 09/21/23 1006       Assessment: Patient had psychotherapy appointment, revealing a substantial history of trauma.  Patient endorsing nightmares, hypervigilance, intrusive thoughts, flashbacks related to past trauma.  Patient was agreeable to starting Zoloft today.  Patient is not having symptoms of psychosis during our evaluation including responding to internal stimuli, reported AVH.  UDS prior was positive for meth, past hallucinations may have been related to the substance.  We will be decreasing Risperdal today.  Patient continues to deny SI/HI, and is not presenting as a danger to himself or others, legal hold will be discontinued today.  Patient was given a safety plan to complete and review with Dr. Arzola.      Dx:  #PTSD  #History of reported schizophrenia  R/o substance-induced psychosis  #Cannabis use disorder  #Stimulant use disorder, amphetamine type  #MDD vs prolonged grief    Medical :  - see medical note      Plan:  - Legal  hold: discontinued 9/22/23  - Psychotropic medications   - DECREASE Risperdal from 1mg PO BID to 1mg qhs for psychosis   - START Zoloft 25mg PO daily for PTSD  - Labs reviewed  - EKG reviewed  - Old records reviewed  - Safety plan given to patient, will review with Dr. Arzola  - Discussed the case with: Dr. Mcelroy and voalte message sent to Dr. Khalil  - Psychiatry will follow up    Thank you for the consult.       This note was created using voice recognition software (Dragon). The accuracy of the dictation is limited by the abilities of the software. I have reviewed the note prior to signing. However, error related to voice recognition software and /or scribes may still exist and should be interpreted within the appropriate context.

## 2023-09-22 NOTE — CARE PLAN
The patient is Stable - Low risk of patient condition declining or worsening    Shift Goals  Clinical Goals: safety  Patient Goals: sleep      Progress made toward(s) clinical / shift goals:  Telesitter in use for legal hold, isolation precautions maintained for Covid-19.      Problem: Knowledge Deficit - Standard  Goal: Patient and family/care givers will demonstrate understanding of plan of care, disease process/condition, diagnostic tests and medications  Outcome: Progressing     Problem: Respiratory  Goal: Patient will achieve/maintain optimum respiratory ventilation and gas exchange  Outcome: Progressing       Patient is not progressing towards the following goals:

## 2023-09-22 NOTE — CONSULTS
"Brief Behavioral Health Care Note:    Name: Dio Ramirez  MRN: [id number]  : 1970  Age: 53 y.o.  Date of assessment: 2023  PCP: Pcp Pt States None  Persons in attendance: Patient     Length of intervention: 30 minutes     HPI: Per Medical record: Dio Ramirez is a 53 year old male admitted 2023 with SOB, cough, chest pain, CXR with pneumonia found to have COVID 19. Patient was referred to Behavioral Health Care for a psychotherapy session while in the hospital.     Psychotherapy Session Summary (as stated by Patient): Patient seen again today with permission, via TeleConsult Zoom link. Patient was observed sitting up on hospital bed, alert, oriented to place and name. Patient states he feels better since \"they finally brought me breakfast! It's almost 11:00\". Patient reports difficulty sleeping due to the \"deathmares\" and the demons that continue to plague him and visit him at night.    Patient continues to present with tearfulness stating, \"I miss my wife\" . Patient again reported his wife  of cancer three years ago. He states, \"I miss her, we were  14 years\".  Patient states, \"she was the one that made me a home owner\". Patient states after his wife  , \"I let the house go, you can't live in a cemetary\". Patient explained that staying in the house without his wife was like living in a cemetary. He decided it was better for him to be homeless.    Patient's insight and judgment appears limited as it relates to this topic. Patient states, \"I learned to live in the shelter and go to [place] to take a shower and get clean clothes\".     Clinician encouraged patient to consider the importance of self care and what does that look like to him. Patient states he needs to get a job and a place to live. Patient was able to acknowledge the importance of taking care of himself even though he misses his wife. Patient states, \"you know I  4 times\". Patient also made this " "statement yesterday. Clinician continued to inquire if patient was suicidal, he denied stating, \"it was those drugs I was using\". Patient reports he slowed down his drug use when his wife was living\". Clinician challenged patient to think towards his future and had him think about what the future can include for him. 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    Clinician continued to provide support and encouragement and promised patient we will meet again to continue processing his feelings of loss and sadness, and encouraging the development of insight regarding managing self care issues, substance use and depressive symptoms.      MENTAL STATUS              Participation: Active verbal participation  Grooming: Casual  Orientation: Alert  Behavior:  some tension noted  Eye contact: Good  Mood: Depressed  Affect: Tearful  Thought process: Circumstantial  Thought content: Evidence of delusion  Speech: Rapid  Perception: Evidence of auditory hallucination  Memory:  Recent:  Limited  Insight: Limited  Judgment:  Limited  Other:  CLINICAL IMPRESSIONS:  Primary:  R/O  other substance induced psychotic disorder  Stimulant use disorder, amphetamine type - current  PTSD Chronic                                      IDENTIFIED NEEDS/PLAN:  [Trigger DISPOSITION list for any items marked]       Imminent safety risk - self   Imminent safety risk - others     Acute substance withdrawal   Psychosis/Impaired reality testing   x  Mood/anxiety  x Substance use/Addictive behavior    x Maladaptive behavior   Parent/child conflict     Family/Couples conflict   Biomedical     Housing   Financial      Legal   Occupational/Educational     Domestic violence   Other:      Recommendations:  Case Management please assist patient in connecting to outpatient case management services upon discharge.    Thank you    Deivka Arzola, Ph.D, [facility]  "

## 2023-09-22 NOTE — DISCHARGE SUMMARY
Discharge Summary    CHIEF COMPLAINT ON ADMISSION  Chief Complaint   Patient presents with    RUQ Pain     Onset last night    Shortness of Breath    N/V       Reason for Admission  Pain in chest, cough, SOB + COVID 19 pneumonia.    Admission Date  9/18/2023    CODE STATUS  Full Code    HPI & HOSPITAL COURSE  Dio Ramirez is a 53 y.o. male past medical history of homelessness, methamphetamine abuse who presented 9/18/2023 with left-sided chest pain worse with breathing that started earlier this morning.  Denies any trauma, fever or chills.  Admits to using methamphetamine.  In the ER, CT of the chest revealed bilateral pneumonia started on broad-spectrum antibiotics.       Interval Problem Update  9/19: ordered COVID 19 testing, +, stopped Rocephin, check procalcitonin.  Started decadron 6mg po daily x 10 days.  ISS.  Patient tearful telling me he just wants to end it all.  Referring to his life.  He states he is unable to recover from losing his wife 3 years ago.  He also told the nurse the same.  No specific plan of suicide, just generalized statements.  Legal hold initiated by me.  Psychiatry consulted and charge RN aware needs sitter.  9/20:  remains depressed about his wife's death 3 years ago.  Pending psychiatry evaluation.  On 1:1 sitter hold, continues with some left sided chest pain from pneumonia. Procalcitonin elevated at 4.51, complete 5 days Rocephin IV, zithromax x 3 days.  On RA.  9/21:  added vitamin B12 IM for low B12 level 410. On RA.  Wbc normalized, mood improved today, joking.    On 9/22, psychiatry released legal hold for patient no longer feeling suicidal.  Safety plan reviewed and completed by psychologist.  Per psychiatry:    Dx:  #PTSD  #History of reported schizophrenia  R/o substance-induced psychosis  #Cannabis use disorder  #Stimulant use disorder, amphetamine type  #MDD vs prolonged grief     Medical :  - see medical note        Plan:  - Legal hold: discontinued 9/22/23  -  Psychotropic medications              - DECREASE Risperdal from 1mg PO BID to 1mg qhs for psychosis              - START Zoloft 25mg PO daily for PTSD  - Labs reviewed  - EKG reviewed  - Old records reviewed  - Safety plan given to patient, will review with Dr. Arzola    Patient was surprised when I told him his legal hold was released and asked if I was kicking him out.  I explained that his COVID 19 pneumonia has improved, on RA, labs normal, he is eating well, ambulating and feeling less depressed.    He asked about getting a bus pass so that he could go to work while living at the shelter.   He understands his medications will be prescribed by [facility].    He will review safety plan prior to dc.    Therefore, he is discharged in good and stable condition to home with close outpatient follow-up.    The patient met 2-midnight criteria for an inpatient stay at the time of discharge.    Discharge Date  9/22/2023    FOLLOW UP ITEMS POST DISCHARGE  Follow up with \[facility]\"" clinic for behavioral health, depression, counseling, hypothyroidism and vitamin B 12 deficiency.  Follow up COVID 19 pneumonia.    DISCHARGE DIAGNOSES  Principal Problem (Resolved):    Sepsis (HCC) (POA: Yes)  Active Problems:    Homeless (POA: Yes)    Methamphetamine use (HCC) (POA: Yes)    Pneumonia due to COVID-19 virus (POA: Yes)    MDD (major depressive disorder), recurrent episode (HCC) (POA: Yes)    Vitamin B12 deficiency (POA: Yes)    Hypothyroidism due to acquired atrophy of thyroid (POA: Yes)    PTSD (post-traumatic stress disorder) (POA: Yes)      FOLLOW UP  No future appointments.  [address] [address]  [phone]  Schedule an appointment as soon as possible for a visit in 1 week(s)  follow up PCP for thyroid, B12, depression.      MEDICATIONS ON DISCHARGE     Medication List        START taking these medications        Instructions   cyanocobalamin 1000 MCG Tabs  Start taking on: September 23,  2023  Commonly known as: Vitamin B12   Take 1 Tablet by mouth every day.  Dose: 1,000 mcg     levothyroxine 25 MCG Tabs  Start taking on: September 23, 2023  Commonly known as: Synthroid   Take 1 Tablet by mouth every morning on an empty stomach.  Dose: 25 mcg     sertraline 25 MG tablet  Commonly known as: Zoloft   Take 1 Tablet by mouth every day.  Dose: 25 mg            CHANGE how you take these medications        Instructions   risperiDONE 1 MG Tabs  What changed:   medication strength  how much to take  when to take this  additional instructions  Commonly known as: RisperDAL   Take 1 Tablet by mouth at bedtime.  Dose: 1 mg              Allergies  No Known Allergies    DIET  Orders Placed This Encounter   Procedures    Diet Order Diet: Regular; Tray Modifications (optional): No Sharps (Paperware)     Standing Status:   Standing     Number of Occurrences:   1     Order Specific Question:   Diet:     Answer:   Regular [1]     Order Specific Question:   Tray Modifications (optional)     Answer:   No Sharps (Paperware)       ACTIVITY  As tolerated.  Weight bearing as tolerated    CONSULTATIONS  Psychiatry  psychology    PROCEDURES  9/18/2023 7:05 PM     HISTORY/REASON FOR EXAM:  Atraumatic right chest pain with pneumonia is suspected on rib series earlier today.        TECHNIQUE/EXAM DESCRIPTION:  CT angiogram scan for pulmonary embolism with contrast, with reconstructions.     1.25 mm helical sections were obtained from the lung apices through the lung bases following the rapid bolus administration of 100 mL of Omnipaque 350 nonionic contrast. Thin-section overlapping reconstruction interval was utilized. Coronal   reconstructions were generated from the axial data. MIP post processing was performed and utilized for the interpretation.     Low dose optimization technique was utilized for this CT exam including automated exposure control and adjustment of the mA and/or kV according to patient size.     COMPARISON:  9/18/2023 CXR/rib series     FINDINGS:  Study is limited by motion artifact.     Pulmonary Embolism: No.     Additional Comments: The heart is normal in size without pericardial effusion.     Lungs: As was seen on the recent chest x-ray there is an airspace process in the right lower lobe consistent with pneumonia. There is also right middle lobe airspace disease with air bronchograms likely due to collapse. There is minimal ill-defined   groundglass opacity in the superior segment of the left lower lobe with patchy groundglass opacities in the left lower lobe posteriorly. No pneumothorax.     Pleura: There is a small dependent right pleural effusion.     Nodes: No measurable enlarged lymph nodes or mass.     Additional findings: No acute or destructive bony process. There is a pectus deformity. Limited imaging of the upper abdomen demonstrates no acute abnormalities.     IMPRESSION:     1.  There is no CT evidence of acute pulmonary embolism.  2.  There is right lower lobe pneumonia.  3.  Additional groundglass opacities in the left lower lobe also consistent with probable pneumonia.  4.  Airspace disease with air bronchograms in the right middle lobe is probably due to collapse favored over pneumonia. No definite proximal endobronchial obstructive process is seen on CT.  5.  Soft tissue in the right hilum could be due to partial collapse of the right middle lobe. No measurable mass identified.  6.  There is a small dependent right pleural effusion.       LABORATORY  Lab Results   Component Value Date    SODIUM 138 09/22/2023    POTASSIUM 4.1 09/22/2023    CHLORIDE 103 09/22/2023    CO2 22 09/22/2023    GLUCOSE 102 (H) 09/22/2023    BUN 13 09/22/2023    CREATININE 0.68 09/22/2023        Lab Results   Component Value Date    WBC 8.9 09/22/2023    HEMOGLOBIN 12.8 (L) 09/22/2023    HEMATOCRIT 38.3 (L) 09/22/2023    PLATELETCT 304 09/22/2023        Total time of the discharge process exceeds 42 minutes.

## 2023-09-25 ENCOUNTER — PHARMACY VISIT (OUTPATIENT)
Dept: PHARMACY | Facility: MEDICAL CENTER | Age: 53
End: 2023-09-25
Payer: COMMERCIAL

## 2023-10-22 ENCOUNTER — APPOINTMENT (OUTPATIENT)
Dept: CARDIOLOGY | Facility: MEDICAL CENTER | Age: 53
End: 2023-10-22
Attending: STUDENT IN AN ORGANIZED HEALTH CARE EDUCATION/TRAINING PROGRAM
Payer: COMMERCIAL

## 2023-10-22 ENCOUNTER — APPOINTMENT (OUTPATIENT)
Dept: RADIOLOGY | Facility: MEDICAL CENTER | Age: 53
End: 2023-10-22
Attending: EMERGENCY MEDICINE
Payer: COMMERCIAL

## 2023-10-22 ENCOUNTER — HOSPITAL ENCOUNTER (OUTPATIENT)
Facility: MEDICAL CENTER | Age: 53
End: 2023-10-23
Attending: EMERGENCY MEDICINE | Admitting: STUDENT IN AN ORGANIZED HEALTH CARE EDUCATION/TRAINING PROGRAM
Payer: COMMERCIAL

## 2023-10-22 DIAGNOSIS — R06.09 DYSPNEA ON EXERTION: ICD-10-CM

## 2023-10-22 DIAGNOSIS — F33.2 SEVERE EPISODE OF RECURRENT MAJOR DEPRESSIVE DISORDER, WITHOUT PSYCHOTIC FEATURES (HCC): ICD-10-CM

## 2023-10-22 DIAGNOSIS — R06.01 ORTHOPNEA: ICD-10-CM

## 2023-10-22 DIAGNOSIS — E03.4 HYPOTHYROIDISM DUE TO ACQUIRED ATROPHY OF THYROID: ICD-10-CM

## 2023-10-22 DIAGNOSIS — F43.10 PTSD (POST-TRAUMATIC STRESS DISORDER): ICD-10-CM

## 2023-10-22 DIAGNOSIS — E53.8 VITAMIN B12 DEFICIENCY: ICD-10-CM

## 2023-10-22 LAB
ALBUMIN SERPL BCP-MCNC: 4.4 G/DL (ref 3.2–4.9)
ALBUMIN/GLOB SERPL: 1.6 G/DL
ALP SERPL-CCNC: 70 U/L (ref 30–99)
ALT SERPL-CCNC: 12 U/L (ref 2–50)
AMPHET UR QL SCN: POSITIVE
ANION GAP SERPL CALC-SCNC: 14 MMOL/L (ref 7–16)
AST SERPL-CCNC: 16 U/L (ref 12–45)
BARBITURATES UR QL SCN: NEGATIVE
BASOPHILS # BLD AUTO: 0.8 % (ref 0–1.8)
BASOPHILS # BLD: 0.05 K/UL (ref 0–0.12)
BENZODIAZ UR QL SCN: NEGATIVE
BILIRUB SERPL-MCNC: 0.4 MG/DL (ref 0.1–1.5)
BUN SERPL-MCNC: 16 MG/DL (ref 8–22)
BZE UR QL SCN: NEGATIVE
CALCIUM ALBUM COR SERPL-MCNC: 8.7 MG/DL (ref 8.5–10.5)
CALCIUM SERPL-MCNC: 9 MG/DL (ref 8.5–10.5)
CANNABINOIDS UR QL SCN: NEGATIVE
CHLORIDE SERPL-SCNC: 99 MMOL/L (ref 96–112)
CO2 SERPL-SCNC: 22 MMOL/L (ref 20–33)
CREAT SERPL-MCNC: 0.82 MG/DL (ref 0.5–1.4)
EKG IMPRESSION: NORMAL
EOSINOPHIL # BLD AUTO: 0.15 K/UL (ref 0–0.51)
EOSINOPHIL NFR BLD: 2.3 % (ref 0–6.9)
ERYTHROCYTE [DISTWIDTH] IN BLOOD BY AUTOMATED COUNT: 43 FL (ref 35.9–50)
FENTANYL UR QL: NEGATIVE
GFR SERPLBLD CREATININE-BSD FMLA CKD-EPI: 104 ML/MIN/1.73 M 2
GLOBULIN SER CALC-MCNC: 2.8 G/DL (ref 1.9–3.5)
GLUCOSE SERPL-MCNC: 107 MG/DL (ref 65–99)
HCT VFR BLD AUTO: 40.3 % (ref 42–52)
HGB BLD-MCNC: 13.3 G/DL (ref 14–18)
IMM GRANULOCYTES # BLD AUTO: 0.01 K/UL (ref 0–0.11)
IMM GRANULOCYTES NFR BLD AUTO: 0.2 % (ref 0–0.9)
LV EJECT FRACT  99904: 55
LV EJECT FRACT MOD 2C 99903: 58
LV EJECT FRACT MOD 4C 99902: 64.6
LV EJECT FRACT MOD BP 99901: 61.88
LYMPHOCYTES # BLD AUTO: 2.49 K/UL (ref 1–4.8)
LYMPHOCYTES NFR BLD: 38.5 % (ref 22–41)
MCH RBC QN AUTO: 28.8 PG (ref 27–33)
MCHC RBC AUTO-ENTMCNC: 33 G/DL (ref 32.3–36.5)
MCV RBC AUTO: 87.2 FL (ref 81.4–97.8)
METHADONE UR QL SCN: NEGATIVE
MONOCYTES # BLD AUTO: 0.66 K/UL (ref 0–0.85)
MONOCYTES NFR BLD AUTO: 10.2 % (ref 0–13.4)
NEUTROPHILS # BLD AUTO: 3.1 K/UL (ref 1.82–7.42)
NEUTROPHILS NFR BLD: 48 % (ref 44–72)
NRBC # BLD AUTO: 0 K/UL
NRBC BLD-RTO: 0 /100 WBC (ref 0–0.2)
NT-PROBNP SERPL IA-MCNC: 164 PG/ML (ref 0–125)
OPIATES UR QL SCN: NEGATIVE
OXYCODONE UR QL SCN: NEGATIVE
PCP UR QL SCN: NEGATIVE
PLATELET # BLD AUTO: 240 K/UL (ref 164–446)
PMV BLD AUTO: 8.6 FL (ref 9–12.9)
POTASSIUM SERPL-SCNC: 3.7 MMOL/L (ref 3.6–5.5)
PROPOXYPH UR QL SCN: NEGATIVE
PROT SERPL-MCNC: 7.2 G/DL (ref 6–8.2)
RBC # BLD AUTO: 4.62 M/UL (ref 4.7–6.1)
SODIUM SERPL-SCNC: 135 MMOL/L (ref 135–145)
TROPONIN T SERPL-MCNC: <6 NG/L (ref 6–19)
WBC # BLD AUTO: 6.5 K/UL (ref 4.8–10.8)

## 2023-10-22 PROCEDURE — 93005 ELECTROCARDIOGRAM TRACING: CPT | Performed by: EMERGENCY MEDICINE

## 2023-10-22 PROCEDURE — 83880 ASSAY OF NATRIURETIC PEPTIDE: CPT

## 2023-10-22 PROCEDURE — 700117 HCHG RX CONTRAST REV CODE 255: Mod: UD | Performed by: EMERGENCY MEDICINE

## 2023-10-22 PROCEDURE — 80307 DRUG TEST PRSMV CHEM ANLYZR: CPT

## 2023-10-22 PROCEDURE — 93306 TTE W/DOPPLER COMPLETE: CPT

## 2023-10-22 PROCEDURE — 71045 X-RAY EXAM CHEST 1 VIEW: CPT

## 2023-10-22 PROCEDURE — G0378 HOSPITAL OBSERVATION PER HR: HCPCS

## 2023-10-22 PROCEDURE — 80053 COMPREHEN METABOLIC PANEL: CPT

## 2023-10-22 PROCEDURE — 84484 ASSAY OF TROPONIN QUANT: CPT

## 2023-10-22 PROCEDURE — 85025 COMPLETE CBC W/AUTO DIFF WBC: CPT

## 2023-10-22 PROCEDURE — 71275 CT ANGIOGRAPHY CHEST: CPT

## 2023-10-22 PROCEDURE — 99222 1ST HOSP IP/OBS MODERATE 55: CPT | Performed by: STUDENT IN AN ORGANIZED HEALTH CARE EDUCATION/TRAINING PROGRAM

## 2023-10-22 PROCEDURE — 93306 TTE W/DOPPLER COMPLETE: CPT | Mod: 26 | Performed by: INTERNAL MEDICINE

## 2023-10-22 PROCEDURE — 36415 COLL VENOUS BLD VENIPUNCTURE: CPT

## 2023-10-22 PROCEDURE — A9270 NON-COVERED ITEM OR SERVICE: HCPCS | Mod: UD | Performed by: STUDENT IN AN ORGANIZED HEALTH CARE EDUCATION/TRAINING PROGRAM

## 2023-10-22 PROCEDURE — 93005 ELECTROCARDIOGRAM TRACING: CPT

## 2023-10-22 PROCEDURE — 99285 EMERGENCY DEPT VISIT HI MDM: CPT

## 2023-10-22 PROCEDURE — 700102 HCHG RX REV CODE 250 W/ 637 OVERRIDE(OP): Mod: UD | Performed by: STUDENT IN AN ORGANIZED HEALTH CARE EDUCATION/TRAINING PROGRAM

## 2023-10-22 RX ORDER — POLYETHYLENE GLYCOL 3350 17 G/17G
1 POWDER, FOR SOLUTION ORAL
Status: DISCONTINUED | OUTPATIENT
Start: 2023-10-22 | End: 2023-10-23 | Stop reason: HOSPADM

## 2023-10-22 RX ORDER — ENOXAPARIN SODIUM 100 MG/ML
40 INJECTION SUBCUTANEOUS DAILY
Status: DISCONTINUED | OUTPATIENT
Start: 2023-10-23 | End: 2023-10-23 | Stop reason: HOSPADM

## 2023-10-22 RX ORDER — LEVOTHYROXINE SODIUM 0.03 MG/1
25 TABLET ORAL
Status: DISCONTINUED | OUTPATIENT
Start: 2023-10-23 | End: 2023-10-23 | Stop reason: HOSPADM

## 2023-10-22 RX ORDER — RISPERIDONE 1 MG/1
1 TABLET ORAL
Status: DISCONTINUED | OUTPATIENT
Start: 2023-10-22 | End: 2023-10-23 | Stop reason: HOSPADM

## 2023-10-22 RX ORDER — ACETAMINOPHEN 325 MG/1
650 TABLET ORAL EVERY 6 HOURS PRN
Status: DISCONTINUED | OUTPATIENT
Start: 2023-10-22 | End: 2023-10-23 | Stop reason: HOSPADM

## 2023-10-22 RX ORDER — CHOLECALCIFEROL (VITAMIN D3) 125 MCG
1000 CAPSULE ORAL DAILY
Status: DISCONTINUED | OUTPATIENT
Start: 2023-10-23 | End: 2023-10-23 | Stop reason: HOSPADM

## 2023-10-22 RX ORDER — BISACODYL 10 MG
10 SUPPOSITORY, RECTAL RECTAL
Status: DISCONTINUED | OUTPATIENT
Start: 2023-10-22 | End: 2023-10-23 | Stop reason: HOSPADM

## 2023-10-22 RX ORDER — PROMETHAZINE HYDROCHLORIDE 25 MG/1
12.5-25 SUPPOSITORY RECTAL EVERY 4 HOURS PRN
Status: DISCONTINUED | OUTPATIENT
Start: 2023-10-22 | End: 2023-10-23 | Stop reason: HOSPADM

## 2023-10-22 RX ORDER — PROCHLORPERAZINE EDISYLATE 5 MG/ML
5-10 INJECTION INTRAMUSCULAR; INTRAVENOUS EVERY 4 HOURS PRN
Status: DISCONTINUED | OUTPATIENT
Start: 2023-10-22 | End: 2023-10-23 | Stop reason: HOSPADM

## 2023-10-22 RX ORDER — LABETALOL HYDROCHLORIDE 5 MG/ML
10 INJECTION, SOLUTION INTRAVENOUS EVERY 4 HOURS PRN
Status: DISCONTINUED | OUTPATIENT
Start: 2023-10-22 | End: 2023-10-23 | Stop reason: HOSPADM

## 2023-10-22 RX ORDER — ONDANSETRON 2 MG/ML
4 INJECTION INTRAMUSCULAR; INTRAVENOUS EVERY 4 HOURS PRN
Status: DISCONTINUED | OUTPATIENT
Start: 2023-10-22 | End: 2023-10-23 | Stop reason: HOSPADM

## 2023-10-22 RX ORDER — ONDANSETRON 4 MG/1
4 TABLET, ORALLY DISINTEGRATING ORAL EVERY 4 HOURS PRN
Status: DISCONTINUED | OUTPATIENT
Start: 2023-10-22 | End: 2023-10-23 | Stop reason: HOSPADM

## 2023-10-22 RX ORDER — PROMETHAZINE HYDROCHLORIDE 25 MG/1
12.5-25 TABLET ORAL EVERY 4 HOURS PRN
Status: DISCONTINUED | OUTPATIENT
Start: 2023-10-22 | End: 2023-10-23 | Stop reason: HOSPADM

## 2023-10-22 RX ADMIN — IOHEXOL 95 ML: 350 INJECTION, SOLUTION INTRAVENOUS at 13:42

## 2023-10-22 RX ADMIN — RISPERIDONE 1 MG: 1 TABLET ORAL at 21:03

## 2023-10-22 ASSESSMENT — ENCOUNTER SYMPTOMS
DOUBLE VISION: 0
SPUTUM PRODUCTION: 0
ORTHOPNEA: 1
SORE THROAT: 0
COUGH: 0
CHILLS: 0
DEPRESSION: 0
VOMITING: 0
PALPITATIONS: 0
BLURRED VISION: 0
FOCAL WEAKNESS: 0
NECK PAIN: 0
NAUSEA: 0
DIZZINESS: 0
ABDOMINAL PAIN: 0
HEARTBURN: 0
FEVER: 0
HEADACHES: 0
SHORTNESS OF BREATH: 1
MYALGIAS: 0

## 2023-10-22 ASSESSMENT — PAIN DESCRIPTION - PAIN TYPE: TYPE: ACUTE PAIN

## 2023-10-22 ASSESSMENT — LIFESTYLE VARIABLES
AVERAGE NUMBER OF DAYS PER WEEK YOU HAVE A DRINK CONTAINING ALCOHOL: 0
ON A TYPICAL DAY WHEN YOU DRINK ALCOHOL HOW MANY DRINKS DO YOU HAVE: 0
DOES PATIENT WANT TO STOP DRINKING: NO
HAVE PEOPLE ANNOYED YOU BY CRITICIZING YOUR DRINKING: NO
TOTAL SCORE: 0
CONSUMPTION TOTAL: NEGATIVE
TOTAL SCORE: 0
HAVE YOU EVER FELT YOU SHOULD CUT DOWN ON YOUR DRINKING: NO
ALCOHOL_USE: NO
TOTAL SCORE: 0
EVER HAD A DRINK FIRST THING IN THE MORNING TO STEADY YOUR NERVES TO GET RID OF A HANGOVER: NO
EVER FELT BAD OR GUILTY ABOUT YOUR DRINKING: NO
HOW MANY TIMES IN THE PAST YEAR HAVE YOU HAD 5 OR MORE DRINKS IN A DAY: 0

## 2023-10-22 ASSESSMENT — PATIENT HEALTH QUESTIONNAIRE - PHQ9
2. FEELING DOWN, DEPRESSED, IRRITABLE, OR HOPELESS: NOT AT ALL
1. LITTLE INTEREST OR PLEASURE IN DOING THINGS: NOT AT ALL
SUM OF ALL RESPONSES TO PHQ9 QUESTIONS 1 AND 2: 0

## 2023-10-22 ASSESSMENT — FIBROSIS 4 INDEX
FIB4 SCORE: 0.91
FIB4 SCORE: 1.039230484541326376

## 2023-10-22 NOTE — H&P
Hospital Medicine History & Physical Note    Date of Service  10/22/2023    Primary Care Physician  Pcp Pt States None    Consultants  N/A    Code Status  Full Code    Chief Complaint  Chief Complaint   Patient presents with    Shortness of Breath       History of Presenting Illness  Dio Ramirez is a 53 y.o. homeless male with methamphetamine abuse, cannabis use, bipolar, hypothyroidism, hx of cardiac arrest after meth use and recent admission for multi-focal pneumonia with Covid who presented 10/22/2023 with SOB for one week especially when he lays down and shortness of breath with exertion. SOB is better when he sits up. Pt also reported associated mild leg swelling. Pt continued to use methamphetamine. Pt denies fever, chills, lightheadedness, vision changes, chest pain, palpitation, dysphagia, abdominal pain, changes in urinary or bowel habits, arthralgia or leg edema.     In ED, afebrile, vitals wnl. Exam was grossly unremarkable.  Labs notable for mild elevation of proBNP (164) and positive for methamphetamine. CXR personally reviewed was without acute cardiopulm abnormalities. EKG done was with NSR and no acute ischemic changes. CT done was neg for PE; improving right sided opacifications. Due to a concern for a possible heart failure, Pt was then referred to Hospitalist services for further management.     I discussed the plan of care with patient, bedside RN, and ERP .    Review of Systems  Review of Systems   Constitutional:  Negative for chills, fever and malaise/fatigue.   HENT:  Negative for congestion, sore throat and tinnitus.    Eyes:  Negative for blurred vision and double vision.   Respiratory:  Positive for shortness of breath. Negative for cough and sputum production.    Cardiovascular:  Positive for orthopnea and leg swelling. Negative for chest pain and palpitations.   Gastrointestinal:  Negative for abdominal pain, heartburn, nausea and vomiting.   Genitourinary:  Negative for  dysuria, frequency and urgency.   Musculoskeletal:  Negative for myalgias and neck pain.   Skin:  Positive for rash.   Neurological:  Negative for dizziness, focal weakness and headaches.   Psychiatric/Behavioral:  Negative for depression.        Past Medical History   has a past medical history of Bipolar 2 disorder (HCC) (02/23/2012), History of COVID-19, Polysubstance abuse (HCC), and Psoriasis.    Surgical History  No PSH    Family History  family history includes Alcohol/Drug in his father; Other in his mother.   Family history reviewed with patient. There is no family history that is pertinent to the chief complaint.     Social History   reports that he has quit smoking. His smoking use included cigarettes. He has never used smokeless tobacco. He reports current drug use. Drug: Inhaled. He reports that he does not drink alcohol.    Allergies  No Known Allergies    Medications  Prior to Admission Medications   Prescriptions Last Dose Informant Patient Reported? Taking?   cyanocobalamin (VITAMIN B12) 1000 MCG Tab   No No   Sig: Take 1 Tablet by mouth every day.   levothyroxine (SYNTHROID) 25 MCG Tab   No No   Sig: Take 1 Tablet by mouth every morning on an empty stomach.   risperiDONE (RISPERDAL) 1 MG Tab   No No   Sig: Take 1 Tablet by mouth at bedtime.   sertraline (ZOLOFT) 25 MG tablet   No No   Sig: Take 1 Tablet by mouth every day.      Facility-Administered Medications: None       Physical Exam  Temp:  [36.6 °C (97.8 °F)-36.7 °C (98 °F)] 36.7 °C (98 °F)  Pulse:  [60-85] 67  Resp:  [13-18] 16  BP: (112-132)/(70-89) 120/80  SpO2:  [94 %-97 %] 97 %  Blood Pressure: 121/84   Temperature: 36.6 °C (97.8 °F)   Pulse: 60   Respiration: 16   Pulse Oximetry: 97 %       Physical Exam  Vitals and nursing note reviewed.   Constitutional:       General: He is not in acute distress.     Appearance: Normal appearance. He is not ill-appearing.   HENT:      Head: Normocephalic and atraumatic.      Mouth/Throat:      Mouth:  Mucous membranes are moist.      Pharynx: Oropharynx is clear.   Eyes:      General: No scleral icterus.     Conjunctiva/sclera: Conjunctivae normal.   Cardiovascular:      Rate and Rhythm: Normal rate and regular rhythm.      Pulses: Normal pulses.      Heart sounds: Normal heart sounds.   Pulmonary:      Effort: Pulmonary effort is normal. No respiratory distress.      Breath sounds: Normal breath sounds. No wheezing.   Abdominal:      General: Bowel sounds are normal. There is no distension.      Palpations: Abdomen is soft.      Tenderness: There is no abdominal tenderness.   Musculoskeletal:         General: No swelling or tenderness. Normal range of motion.   Skin:     General: Skin is warm and dry.      Capillary Refill: Capillary refill takes less than 2 seconds.      Findings: Rash present.      Comments: B/l LE   Neurological:      General: No focal deficit present.      Mental Status: He is alert and oriented to person, place, and time. Mental status is at baseline.   Psychiatric:         Mood and Affect: Mood normal.         Laboratory:  Recent Labs     10/22/23  1042   WBC 6.5   RBC 4.62*   HEMOGLOBIN 13.3*   HEMATOCRIT 40.3*   MCV 87.2   MCH 28.8   MCHC 33.0   RDW 43.0   PLATELETCT 240   MPV 8.6*     Recent Labs     10/22/23  1042   SODIUM 135   POTASSIUM 3.7   CHLORIDE 99   CO2 22   GLUCOSE 107*   BUN 16   CREATININE 0.82   CALCIUM 9.0     Recent Labs     10/22/23  1042   ALTSGPT 12   ASTSGOT 16   ALKPHOSPHAT 70   TBILIRUBIN 0.4   GLUCOSE 107*         Recent Labs     10/22/23  1042   NTPROBNP 164*         Recent Labs     10/22/23  1042   TROPONINT <6       Imaging:  EC-ECHOCARDIOGRAM COMPLETE W/O CONT         CT-CTA CHEST PULMONARY ARTERY W/ RECONS   Final Result      1.  No CT evidence of pulmonary embolism.      2.  Residual scarring or atelectasis noted in the right middle pulmonary lobe. A right-sided pulmonary opacifications have resolved since the prior CT.            DX-CHEST-PORTABLE (1 VIEW)    Final Result      Negative single view of the chest.        CXR personally reviewed was without acute cardiopulm abnormalities.     EKG done personally reviewed was with NSR and no acute ischemic changes.     Assessment/Plan:  Justification for Admission Status  I anticipate this patient is appropriate for observation status at this time because expect to complete suspected CHF rule in or rule out within Obs time frame.     Patient will need a Telemetry bed on MEDICAL service .  The need is secondary to as above.    * Dyspnea on exertion- (present on admission)  Assessment & Plan  R/o CHF given history of methamphetamine use  Supportive care  Will check ambulatory O2 in AM    MDD (major depressive disorder), recurrent episode (HCC)- (present on admission)  Assessment & Plan  C/w home sertraline    Methamphetamine use (HCC)- (present on admission)  Assessment & Plan  Monitor for signs of withdrawal  Been using since age 15  Cessation recommended as it may be the source for acute HF.    Homeless- (present on admission)  Assessment & Plan  Staying at [facility]    Bipolar 2 disorder (HCC)- (present on admission)  Assessment & Plan  C/w home risperdal  Follow with outpatient therapy        VTE prophylaxis: enoxaparin ppx

## 2023-10-22 NOTE — ED NOTES
Med rec updated and complete.   Allergies reviewed.   Confirmed name and date of birth.  Pt denies anticoagulant and antiplatelet  medications.   Pt denies antibiotic use in last 30 days.        Home pharmacy   [facility] [phone]    Last prescriptions filled at  [facility] [phone]

## 2023-10-22 NOTE — ED TRIAGE NOTES
Chief Complaint   Patient presents with    Shortness of Breath      Pt ambulate to triage with above complaint. Pt reports orthopnea x 1 week, and mild SOB with exertion. Pt notes HA if he stands up too fast. Had COVID 1 month ago, and wants a check up today.   Pt educated on triage process and returned to lobby.

## 2023-10-22 NOTE — ASSESSMENT & PLAN NOTE
Monitor for signs of withdrawal  Been using since age 15  Cessation recommended as it may be the source for acute HF.

## 2023-10-22 NOTE — PROGRESS NOTES
Patient transported to unit by this RN on telemetry box via wheelchair. Patient ambulated from wheelchair to hospital bed with standby assist.

## 2023-10-22 NOTE — ED PROVIDER NOTES
"  ER Provider Note    Scribed for Fede Mcclendon M.d. by Constantin Slade. 10/22/2023  12:02 PM    Primary Care Provider: Pcp Pt States None    CHIEF COMPLAINT  Chief Complaint   Patient presents with    Shortness of Breath     EXTERNAL RECORDS REVIEWED  Inpatient Notes Admitted late September this year with bilateral pneumonia, meth use, and suicidal ideation. Treated with antibiotics.     HPI/ROS  LIMITATION TO HISTORY   Select: : None  OUTSIDE HISTORIAN(S):  None    Dio Ramirez is a 53 y.o. male who presents to the ED complaining of shortness of breath when he lies flat onset one week ago. He describes feeling short of breath when he lies down and some mild shortness of breath while walking. He has associated chest heaviness when he lays down with some leg swelling. He denies experiencing shortness of breath when sitting up.  No fever or chills.  He reports a history of Covid and pneumonia one month ago. For his pneumonia, he took an antibiotic treatment.  Patient reports that when he was admitted here with his pneumonia and his COVID, he was \"put on suicide watch.\"  Patient is not suicidal today.  Patient has history of methamphetamine use.  He says he \"does not old school and snorts it.\"  He also reports that he had cardiac arrest 4 times due to fentanyl overdoses.  He smokes marijuana.  Patient denies history of heart failure in the past.  No hemoptysis.  No abdominal pain.  No sore throat or runny nose.    PAST MEDICAL HISTORY  Past Medical History:   Diagnosis Date    Bipolar 2 disorder (HCC) 02/23/2012    History of COVID-19     Polysubstance abuse (HCC)     Psoriasis        SURGICAL HISTORY  History reviewed. No pertinent surgical history.    FAMILY HISTORY  Family History   Problem Relation Age of Onset    Other Mother         epilepsy    Alcohol/Drug Father        SOCIAL HISTORY   reports that he has quit smoking. His smoking use included cigarettes. He has never used smokeless tobacco. He reports " "current drug use. Drug: Inhaled. He reports that he does not drink alcohol.    CURRENT MEDICATIONS  Current Discharge Medication List        CONTINUE these medications which have NOT CHANGED    Details   risperiDONE (RISPERDAL) 1 MG Tab Take 1 Tablet by mouth at bedtime.  Qty: 30 Tablet, Refills: 0    Associated Diagnoses: PTSD (post-traumatic stress disorder)      levothyroxine (SYNTHROID) 25 MCG Tab Take 1 Tablet by mouth every morning on an empty stomach.  Qty: 30 Tablet, Refills: 0    Associated Diagnoses: Hypothyroidism due to acquired atrophy of thyroid      cyanocobalamin (VITAMIN B12) 1000 MCG Tab Take 1 Tablet by mouth every day.  Qty: 30 Tablet, Refills: 0    Associated Diagnoses: Vitamin B12 deficiency      sertraline (ZOLOFT) 25 MG tablet Take 1 Tablet by mouth every day.  Qty: 30 Tablet, Refills: 0    Associated Diagnoses: Severe episode of recurrent major depressive disorder, without psychotic features (HCC)             ALLERGIES  Patient has no known allergies.    PHYSICAL EXAM  /83   Pulse 85   Temp 36.6 °C (97.8 °F) (Temporal)   Resp 18   Ht 1.727 m (5' 8\")   Wt 58.2 kg (128 lb 4.9 oz)   SpO2 97%   BMI 19.51 kg/m²   Constitutional: Well developed, well nourished; No acute distress; Non-toxic appearance, edentulous, disheveled and unkempt,   HENT: Normocephalic, atraumatic; Bilateral external ears normal; Dry mucus membranes;  Eyes: PERRL, EOMI, Conjunctiva normal. No discharge.   Neck:  Supple, nontender midline; No stridor; No nuchal rigidity.   Lymphatic: No cervical lymphadenopathy noted.   Cardiovascular: Regular rate and rhythm without murmurs, rubs, or gallop.   Thorax & Lungs: Markedly decreased breath sounds throughout without wheezing, rales or rhonchi. Nontender chest wall. No crepitus or subcutaneous air  Abdomen: Soft, nontender, bowel sounds normal. No obvious masses; No pulsatile masses; no rebound, guarding, or peritoneal signs.   Skin: Good color; warm and dry without " rash or petechia.  Back: Nontender, No CVA tenderness.   Extremities: Excoriations to left leg, Distal radial, dorsalis pedis, posterior tibial pulses are equal bilaterally; trace pretibial edema; Nontender calves or saphenous, No cyanosis, No clubbing. Patient was storing marijuana wrapped in some tissue paper in his sock.  Musculoskeletal: Good range of motion in all major joints. No tenderness to palpation or major deformities noted.   Neurologic: Alert & oriented x 4, clear speech, fidgety and restless.  Pressured speech.  Denies SI.    DIAGNOSTIC STUDIES    Labs:   Results for orders placed or performed during the hospital encounter of 10/22/23   CBC with Differential   Result Value Ref Range    WBC 6.5 4.8 - 10.8 K/uL    RBC 4.62 (L) 4.70 - 6.10 M/uL    Hemoglobin 13.3 (L) 14.0 - 18.0 g/dL    Hematocrit 40.3 (L) 42.0 - 52.0 %    MCV 87.2 81.4 - 97.8 fL    MCH 28.8 27.0 - 33.0 pg    MCHC 33.0 32.3 - 36.5 g/dL    RDW 43.0 35.9 - 50.0 fL    Platelet Count 240 164 - 446 K/uL    MPV 8.6 (L) 9.0 - 12.9 fL    Neutrophils-Polys 48.00 44.00 - 72.00 %    Lymphocytes 38.50 22.00 - 41.00 %    Monocytes 10.20 0.00 - 13.40 %    Eosinophils 2.30 0.00 - 6.90 %    Basophils 0.80 0.00 - 1.80 %    Immature Granulocytes 0.20 0.00 - 0.90 %    Nucleated RBC 0.00 0.00 - 0.20 /100 WBC    Neutrophils (Absolute) 3.10 1.82 - 7.42 K/uL    Lymphs (Absolute) 2.49 1.00 - 4.80 K/uL    Monos (Absolute) 0.66 0.00 - 0.85 K/uL    Eos (Absolute) 0.15 0.00 - 0.51 K/uL    Baso (Absolute) 0.05 0.00 - 0.12 K/uL    Immature Granulocytes (abs) 0.01 0.00 - 0.11 K/uL    NRBC (Absolute) 0.00 K/uL   Comp Metabolic Panel   Result Value Ref Range    Sodium 135 135 - 145 mmol/L    Potassium 3.7 3.6 - 5.5 mmol/L    Chloride 99 96 - 112 mmol/L    Co2 22 20 - 33 mmol/L    Anion Gap 14.0 7.0 - 16.0    Glucose 107 (H) 65 - 99 mg/dL    Bun 16 8 - 22 mg/dL    Creatinine 0.82 0.50 - 1.40 mg/dL    Calcium 9.0 8.5 - 10.5 mg/dL    Correct Calcium 8.7 8.5 - 10.5 mg/dL     AST(SGOT) 16 12 - 45 U/L    ALT(SGPT) 12 2 - 50 U/L    Alkaline Phosphatase 70 30 - 99 U/L    Total Bilirubin 0.4 0.1 - 1.5 mg/dL    Albumin 4.4 3.2 - 4.9 g/dL    Total Protein 7.2 6.0 - 8.2 g/dL    Globulin 2.8 1.9 - 3.5 g/dL    A-G Ratio 1.6 g/dL   ESTIMATED GFR   Result Value Ref Range    GFR (CKD-EPI) 104 >60 mL/min/1.73 m 2   URINE DRUG SCREEN   Result Value Ref Range    Amphetamines Urine Positive (A) Negative    Barbiturates Negative Negative    Benzodiazepines Negative Negative    Cocaine Metabolite Negative Negative    Fentanyl, Urine Negative Negative    Methadone Negative Negative    Opiates Negative Negative    Oxycodone Negative Negative    Phencyclidine -Pcp Negative Negative    Propoxyphene Negative Negative    Cannabinoid Metab Negative Negative   proBrain Natriuretic Peptide, NT   Result Value Ref Range    NT-proBNP 164 (H) 0 - 125 pg/mL   TROPONIN   Result Value Ref Range    Troponin T <6 6 - 19 ng/L   EKG   Result Value Ref Range    Report       [facility] Emergency Dept.    Test Date:  2023-10-22  Pt Name:    LUIS MANUEL ORTEGA                Department: ER  MRN:        [id number]                      Room:  Gender:     Male                         Technician: [id number]  :        1970                   Requested By:ER TRIAGE PROTOCOL  Order #:    [id number]                    Reading MD: Debi Lopez    Measurements  Intervals                                Axis  Rate:       73                           P:          69  WI:         163                          QRS:        9  QRSD:       93                           T:          36  QT:         369  QTc:        407    Interpretive Statements  Sinus rhythm rate 73  Normal axis  Normal intervals  No ST elevation  Mild ST depession V6  RSR' in V1 or V2, right VCD or RVH  Compared to ECG 2023 16:58:07  No significant changes  Electronically Signed On 10- 13:55:01 PDT by Debi Lopez          EKG:   Results for orders placed or  performed during the hospital encounter of 10/22/23   EKG   Result Value Ref Range    Report       [facility] Emergency Dept.    Test Date:  2023-10-22  Pt Name:    LUIS MANUEL ORTEGA                Department: ER  MRN:        [id number]                      Room:  Gender:     Male                         Technician: [id number]  :        1970                   Requested By:ER TRIAGE PROTOCOL  Order #:    [id number]                    Reading MD: Debi Lopez    Measurements  Intervals                                Axis  Rate:       73                           P:          69  ND:         163                          QRS:        9  QRSD:       93                           T:          36  QT:         369  QTc:        407    Interpretive Statements  Sinus rhythm rate 73  Normal axis  Normal intervals  No ST elevation  Mild ST depession V6  RSR' in V1 or V2, right VCD or RVH  Compared to ECG 2023 16:58:07  No significant changes  Electronically Signed On 10- 13:55:01 PDT by Debi Lopez      12 Lead EKG interpreted by me as shown above.    Radiology:   The attending emergency physician has independently interpreted the diagnostic imaging associated with this visit and am waiting the final reading from the radiologist.     Preliminary interpretation is a follows: ER MD is reviewed the patient's chest x-ray.  No obvious infiltrates.    Radiologist interpretation:   EC-ECHOCARDIOGRAM COMPLETE W/O CONT         CT-CTA CHEST PULMONARY ARTERY W/ RECONS   Final Result      1.  No CT evidence of pulmonary embolism.      2.  Residual scarring or atelectasis noted in the right middle pulmonary lobe. A right-sided pulmonary opacifications have resolved since the prior CT.            DX-CHEST-PORTABLE (1 VIEW)   Final Result      Negative single view of the chest.         COURSE & MEDICAL DECISION MAKING     ED Observation Status? No; Patient does not meet criteria for ED Observation.     INITIAL ASSESSMENT,  COURSE AND PLAN  Care Narrative: Patient presents to the ER with complaint of orthopnea for the last 1 week.  He also complains of a little bit of chest heaviness when he lies flat.  He has some mild shortness of breath with exertion.  Month ago he was here in the hospital with COVID and bilateral pneumonia.  He said he completed his course of antibiotics.  He has not had a fever.  No hemoptysis.  He uses methamphetamine, marijuana, and has a history of fentanyl use overdose as well.  Patient's EKG reveals some very subtle ST depression in V6.  Otherwise no ST elevation.  Initial troponin is negative.  At this time no concern for STEMI or non-STEMI.  BNP is minimally elevated at 164.  Given patient's complaint of orthopnea, I am concerned about heart failure/pump dysfunction, especially in this patient as he is very high risk given his methamphetamine use and his recent COVID infection.  Patient is not in any respiratory distress.  He is not hypoxic.  He is not tachycardic or hypotensive.  His chest x-ray here today is negative.  I did a CT scan of the chest to be sure he did not have a PE.  There is no PE.  There is no residual pneumonia.  Patient has no evidence of a echocardiogram or previous cardiac work-up here at [facility] in the past.  I think it best that we hospitalize him to get an echocardiogram to evaluate for the possibility of pump dysfunction.  I spoke with the hospitalist on-call and he will kindly evaluate the patient for hospitalization.    12:02 PM - Patient presents to the ED with shortness of breath onset one week ago. He describes feeling short of breath when he lies down. He has associated chest pain when he lays down with some leg swelling. He denies experiencing shortness of breath when sitting up or while walking or fever. Ordered for labs/imaging to evaluate his symptoms.        Patient was reevaluated at bedside.  Discussed lab and radiology results with the patient and informed them that we  will be hospitalized for further cardiac work-up. Spoke with Dr. Mcclendon, (Hospitalist), about the patient's condition who agreed to evaluate the patient for hospitalization.         ADDITIONAL PROBLEM LIST  Problem #1: Orthopnea  Problem #2: Dyspnea on exertion  Problem #3: Polysubstance use    DISPOSITION AND DISCUSSIONS  I have discussed management of the patient with the following physicians and NIYA's:  Dr. Mcclendon (Hospitalist)    Discussion of management with other [facility] or appropriate source(s): None     Escalation of care considered, and ultimately not performed: IV fluids.  I elected not to give the patient any IV fluids as I was concerned about the possibility of heart failure in this patient with complaint of orthopnea and some dyspnea on exertion.    Barriers to care at this time, including but not limited to:  Patient is homeless and a polysubstance abuser. .     Decision tools and prescription drugs considered including, but not limited to: Antibiotics no evidence of recurrent or new pneumonia.  No need for antibiotics at this time. .    DISPOSITION:  Patient will be hospitalized by Dr. Mcclendon (Hospitalist) in guarded condition.     FINAL DIAGNOSIS  1. Orthopnea Acute   2. Dyspnea on exertion Acute       This dictation has been created using voice recognition software. The accuracy of the dictation is limited by the abilities of the software. I expect there may be some errors of grammar and possibly content. I made every attempt to manually correct the errors within my dictation. However, errors related to voice recognition software may still exist and should be interpreted within the appropriate context.      Constantin HARO (Mariano), am scribing for, and in the presence of, Debi Lopez M.D..    Electronically signed by: Constantin Slade (Mariano), 10/22/2023    Debi HARO M.D. personally performed the services described in this documentation, as scribed by Constantin Slade in my presence, and it is both  accurate and complete.      The note accurately reflects work and decisions made by me.  Debi Lopez M.D.  10/22/2023  4:51 PM

## 2023-10-23 ENCOUNTER — PHARMACY VISIT (OUTPATIENT)
Dept: PHARMACY | Facility: MEDICAL CENTER | Age: 53
End: 2023-10-23
Payer: COMMERCIAL

## 2023-10-23 VITALS
TEMPERATURE: 98.7 F | WEIGHT: 128.53 LBS | DIASTOLIC BLOOD PRESSURE: 63 MMHG | OXYGEN SATURATION: 96 % | BODY MASS INDEX: 19.48 KG/M2 | HEART RATE: 50 BPM | SYSTOLIC BLOOD PRESSURE: 108 MMHG | HEIGHT: 68 IN | RESPIRATION RATE: 18 BRPM

## 2023-10-23 PROCEDURE — 700102 HCHG RX REV CODE 250 W/ 637 OVERRIDE(OP): Mod: UD | Performed by: STUDENT IN AN ORGANIZED HEALTH CARE EDUCATION/TRAINING PROGRAM

## 2023-10-23 PROCEDURE — A9270 NON-COVERED ITEM OR SERVICE: HCPCS | Mod: UD | Performed by: STUDENT IN AN ORGANIZED HEALTH CARE EDUCATION/TRAINING PROGRAM

## 2023-10-23 PROCEDURE — 99239 HOSP IP/OBS DSCHRG MGMT >30: CPT | Performed by: STUDENT IN AN ORGANIZED HEALTH CARE EDUCATION/TRAINING PROGRAM

## 2023-10-23 PROCEDURE — RXOTC WILLOW AMBULATORY OTC CHARGE: Performed by: PHARMACIST

## 2023-10-23 PROCEDURE — G0378 HOSPITAL OBSERVATION PER HR: HCPCS

## 2023-10-23 PROCEDURE — RXMED WILLOW AMBULATORY MEDICATION CHARGE: Performed by: STUDENT IN AN ORGANIZED HEALTH CARE EDUCATION/TRAINING PROGRAM

## 2023-10-23 RX ORDER — SERTRALINE HYDROCHLORIDE 25 MG/1
25 TABLET, FILM COATED ORAL DAILY
Qty: 30 TABLET | Refills: 0 | Status: SHIPPED | OUTPATIENT
Start: 2023-10-23

## 2023-10-23 RX ORDER — RISPERIDONE 1 MG/1
1 TABLET ORAL
Qty: 30 TABLET | Refills: 0 | Status: SHIPPED | OUTPATIENT
Start: 2023-10-23

## 2023-10-23 RX ORDER — LEVOTHYROXINE SODIUM 0.03 MG/1
25 TABLET ORAL
Qty: 30 TABLET | Refills: 0 | Status: SHIPPED | OUTPATIENT
Start: 2023-10-23

## 2023-10-23 RX ADMIN — LEVOTHYROXINE SODIUM 25 MCG: 25 TABLET ORAL at 05:03

## 2023-10-23 RX ADMIN — CYANOCOBALAMIN TAB 500 MCG 1000 MCG: 500 TAB at 05:02

## 2023-10-23 RX ADMIN — SERTRALINE 25 MG: 50 TABLET, FILM COATED ORAL at 05:03

## 2023-10-23 NOTE — PROGRESS NOTES
4 Eyes Skin Assessment Completed by Nelly RN and Hailey RN.    Head WDL  Ears WDL  Nose WDL  Mouth WDL  Neck WDL  Breast/Chest WDL  Shoulder Blades WDL  Spine WDL  (R) Arm/Elbow/Hand WDL  (L) Arm/Elbow/Hand WDL  Abdomen WDL  Groin WDL  Scrotum/Coccyx/Buttocks WDL  (R) Leg Swelling  (L) Leg Swelling  (R) Heel/Foot/Toe WDL  (L) Heel/Foot/Toe WDL          Devices In Places Tele Box and Pulse Ox      Interventions In Place Pillows    Possible Skin Injury No    Pictures Uploaded Into Epic N/A  Wound Consult Placed N/A  RN Wound Prevention Protocol Ordered No

## 2023-10-23 NOTE — CARE PLAN
The patient is Stable - Low risk of patient condition declining or worsening    Shift Goals  Clinical Goals: Echocardiogram, telemetry monitoring  Patient Goals: Eat and rest  Family Goals: No family present    Progress made toward(s) clinical / shift goals:    Problem: Knowledge Deficit - Standard  Goal: Patient and family/care givers will demonstrate understanding of plan of care, disease process/condition, diagnostic tests and medications  Outcome: Progressing       Patient is not progressing towards the following goals:

## 2023-10-23 NOTE — PROGRESS NOTES
Received report from day shift RN. Assumed patient care. Patient resting in bed, NAD, A&O x4. Patient states no pain and no SOB. Patient ambulated to restroom with steady gait. Tele monitor in place. POC discussed with patient, all questions addressed. Call light within reach.

## 2023-10-23 NOTE — CARE PLAN
The patient is Stable - Low risk of patient condition declining or worsening    Shift Goals  Clinical Goals: Waiting echo results, tele monitoring  Patient Goals: Rest  Family Goals: Not at bedside    POC discussed with patient on pending echo results, continued tele monitoring, and walking O2 eval in AM. Patient verbalized understanding of treatment plan and education.     Problem: Knowledge Deficit - Standard  Goal: Patient and family/care givers will demonstrate understanding of plan of care, disease process/condition, diagnostic tests and medications  Outcome: Progressing     Problem: Respiratory  Goal: Patient will achieve/maintain optimum respiratory ventilation and gas exchange  Outcome: Progressing

## 2023-10-23 NOTE — PROGRESS NOTES
Monitor summary: SR 63-70, AK .17, QRS .07, QT .40, no ectopy per strip from monitor room.

## 2023-10-24 NOTE — DISCHARGE SUMMARY
Discharge Summary    CHIEF COMPLAINT ON ADMISSION  Chief Complaint   Patient presents with    Shortness of Breath       Reason for Admission  SOB and CASTELLON    Admission Date  10/22/2023    CODE STATUS  FULL    HPI & HOSPITAL COURSE  Dio Ramirez is a 53 y.o. homeless male with methamphetamine abuse, cannabis use, bipolar, hypothyroidism, hx of cardiac arrest after meth use and recent admission for multi-focal pneumonia with Covid who presented 10/22/2023 with SOB for one week especially when he lays down and shortness of breath with exertion. SOB is better when he sits up. Pt also reported associated mild leg swelling. Pt continued to use methamphetamine. Pt denies fever, chills, lightheadedness, vision changes, chest pain, palpitation, dysphagia, abdominal pain, changes in urinary or bowel habits, arthralgia or leg edema.      In ED, afebrile, vitals wnl. Exam was grossly unremarkable.  Labs notable for mild elevation of proBNP (164) and positive for methamphetamine. CXR personally reviewed was without acute cardiopulm abnormalities. EKG done was with NSR and no acute ischemic changes. CT done was neg for PE; improving right sided opacifications. Due to a concern for a possible heart failure, Pt was then referred to Hospitalist services for further management.     Hospital course: afebrile and vitals wnl. Satting well in RA. Exam at bedside was grossly unremarkable. Labs and imagings findings discussed with patient. Echo reviewed: normal LVEF, no wall motion abnormalities and no significant valvular diseases. At this point, unclear decompensated CHF. We discussed about possible cessation of methamphetamine and its consequences.       Therefore, he is discharged in fair and stable condition to home with close outpatient follow-up.    The patient recovered much more quickly than anticipated on admission.    Discharge Date  10/23/2023    FOLLOW UP ITEMS POST DISCHARGE  N/A    DISCHARGE DIAGNOSES  Principal  Problem:    Dyspnea on exertion (POA: Yes)  Active Problems:    Bipolar 2 disorder (HCC) (POA: Yes)      Overview: Dr aviles    Homeless (POA: Yes)    Methamphetamine use (HCC) (POA: Yes)    MDD (major depressive disorder), recurrent episode (HCC) (POA: Yes)  Resolved Problems:    * No resolved hospital problems. *      FOLLOW UP  [facility], Emergency Dept  [address] [address] [phone]  Follow up  If symptoms worsen      MEDICATIONS ON DISCHARGE     Medication List        CONTINUE taking these medications        Instructions   B-12 1000 MCG Tabs   Take 1 Tablet by mouth every day.  Dose: 1,000 mcg     levothyroxine 25 MCG Tabs  Commonly known as: Synthroid   Take 1 Tablet by mouth every morning on an empty stomach.  Dose: 25 mcg     risperiDONE 1 MG Tabs  Commonly known as: RisperDAL   Take 1 Tablet by mouth at bedtime.  Dose: 1 mg     sertraline 25 MG tablet  Commonly known as: Zoloft   Take 1 Tablet by mouth every day.  Dose: 25 mg              Allergies  No Known Allergies    DIET  Cardiac      ACTIVITY  As tolerated.  Weight bearing as tolerated    CONSULTATIONS  N/A    PROCEDURES  N/A    LABORATORY  Lab Results   Component Value Date    SODIUM 135 10/22/2023    POTASSIUM 3.7 10/22/2023    CHLORIDE 99 10/22/2023    CO2 22 10/22/2023    GLUCOSE 107 (H) 10/22/2023    BUN 16 10/22/2023    CREATININE 0.82 10/22/2023        Lab Results   Component Value Date    WBC 6.5 10/22/2023    HEMOGLOBIN 13.3 (L) 10/22/2023    HEMATOCRIT 40.3 (L) 10/22/2023    PLATELETCT 240 10/22/2023        Total time of the discharge process exceeds 36 minutes.

## 2023-12-11 NOTE — ED TRIAGE NOTES
Dio Ramirez  52 y.o. male  Chief Complaint   Patient presents with    Sent by MD     Patient left AMA on 1/26 due to concern over losing bed in shelter, came back today to be admitted. Patient has been taking antibiotics as prescribed since leaving the ER. Patient has multiple wounds to his bilateral legs x one month. Right leg is worse than left, wounds are mainly concentrated in the joints, such as the knees and ankles. -trauma. Patient uses methamphetamine, reports last use was 3 days ago.        Pt ambulatory to triage with steady gait for above complaint with a rolling suitcase and a large backpack of belongings.     Pt is GCS 15, speaking in full sentences, follows commands and responds appropriately to questions. Resp are even and unlabored.     Pt placed in lobby. Pt educated on triage process. Pt encouraged to alert staff for any changes.     This RN masked and in appropriate PPE during encounter.     Vitals:    01/28/23 1302   BP: 98/57   Pulse: 83   Resp: 18   Temp: 37 °C (98.6 °F)   SpO2: 98%       THYROID ASSESSMENT      No LMP recorded. (Menstrual status: Intrauterine Device). Recent Weight Change: sameTremors: noHeat/Cold Intolerance: heat  Energy: very lowBowel: noDifficulty Swallowing:noPalpitations: yes Hoarseness: noSkin / Hair: increased hair lossPresent Thyroid Medication:  Methimazole 5 mg 1 m-sat.   At times has missed a dose

## 2024-03-14 PROCEDURE — RXMED WILLOW AMBULATORY MEDICATION CHARGE: Performed by: NURSE PRACTITIONER

## 2024-03-18 ENCOUNTER — PHARMACY VISIT (OUTPATIENT)
Dept: PHARMACY | Facility: MEDICAL CENTER | Age: 54
End: 2024-03-18
Payer: COMMERCIAL

## 2024-03-28 PROCEDURE — RXMED WILLOW AMBULATORY MEDICATION CHARGE: Performed by: NURSE PRACTITIONER

## 2024-03-29 ENCOUNTER — PHARMACY VISIT (OUTPATIENT)
Dept: PHARMACY | Facility: MEDICAL CENTER | Age: 54
End: 2024-03-29
Payer: COMMERCIAL

## 2024-04-08 PROCEDURE — RXMED WILLOW AMBULATORY MEDICATION CHARGE: Performed by: NURSE PRACTITIONER

## 2024-04-09 ENCOUNTER — PHARMACY VISIT (OUTPATIENT)
Dept: PHARMACY | Facility: MEDICAL CENTER | Age: 54
End: 2024-04-09
Payer: COMMERCIAL

## 2024-05-01 PROCEDURE — RXMED WILLOW AMBULATORY MEDICATION CHARGE: Performed by: NURSE PRACTITIONER

## 2024-05-14 ENCOUNTER — HOSPITAL ENCOUNTER (EMERGENCY)
Facility: MEDICAL CENTER | Age: 54
End: 2024-05-14
Attending: EMERGENCY MEDICINE
Payer: COMMERCIAL

## 2024-05-14 ENCOUNTER — PHARMACY VISIT (OUTPATIENT)
Dept: PHARMACY | Facility: MEDICAL CENTER | Age: 54
End: 2024-05-14
Payer: COMMERCIAL

## 2024-05-14 VITALS
OXYGEN SATURATION: 96 % | SYSTOLIC BLOOD PRESSURE: 91 MMHG | WEIGHT: 136.02 LBS | HEIGHT: 68 IN | TEMPERATURE: 97.3 F | BODY MASS INDEX: 20.62 KG/M2 | DIASTOLIC BLOOD PRESSURE: 50 MMHG | RESPIRATION RATE: 18 BRPM | HEART RATE: 82 BPM

## 2024-05-14 DIAGNOSIS — L03.119 CELLULITIS OF LOWER EXTREMITY, UNSPECIFIED LATERALITY: ICD-10-CM

## 2024-05-14 DIAGNOSIS — L03.115 CELLULITIS OF RIGHT LOWER EXTREMITY: ICD-10-CM

## 2024-05-14 DIAGNOSIS — L03.116 CELLULITIS OF LEFT LOWER EXTREMITY: ICD-10-CM

## 2024-05-14 DIAGNOSIS — T14.8XXA WOUND INFECTION: ICD-10-CM

## 2024-05-14 DIAGNOSIS — L08.9 WOUND INFECTION: ICD-10-CM

## 2024-05-14 PROCEDURE — RXMED WILLOW AMBULATORY MEDICATION CHARGE: Performed by: EMERGENCY MEDICINE

## 2024-05-14 RX ORDER — HYDROCODONE BITARTRATE AND ACETAMINOPHEN 5; 325 MG/1; MG/1
1 TABLET ORAL ONCE
Status: COMPLETED | OUTPATIENT
Start: 2024-05-14 | End: 2024-05-14

## 2024-05-14 RX ORDER — DOXYCYCLINE 100 MG/1
100 TABLET ORAL ONCE
Status: COMPLETED | OUTPATIENT
Start: 2024-05-14 | End: 2024-05-14

## 2024-05-14 RX ORDER — DOXYCYCLINE 100 MG/1
100 CAPSULE ORAL 2 TIMES DAILY
Qty: 28 CAPSULE | Refills: 0 | Status: ACTIVE | OUTPATIENT
Start: 2024-05-14 | End: 2024-05-28

## 2024-05-14 RX ADMIN — DOXYCYCLINE 100 MG: 100 TABLET, FILM COATED ORAL at 08:47

## 2024-05-14 RX ADMIN — HYDROCODONE BITARTRATE AND ACETAMINOPHEN 1 TABLET: 5; 325 TABLET ORAL at 08:47

## 2024-05-14 ASSESSMENT — FIBROSIS 4 INDEX: FIB4 SCORE: 1.058475493514313902

## 2024-05-14 ASSESSMENT — LIFESTYLE VARIABLES: DO YOU DRINK ALCOHOL: NO

## 2024-05-14 NOTE — ED NOTES
Pt. Ambulatory to room from lobby with steady gait at this time.  Instructed to change into gown for MD putnam.      Call light in reach.

## 2024-05-14 NOTE — ED TRIAGE NOTES
"Chief Complaint   Patient presents with    Wound Check     Patient states he has had persistent leg infections for past 3 years and states that for past week it has flared up. Scabs noted to bilateral legs. Hx psoriasis       Patient to triage ambulatory with a steady gait, AAOx4, Appropriate precautions in place.     Explained wait time and triage process. Placed back in lobby. Told to notify ED tech or RN of any changes, verbalized understanding.    BP 94/61   Pulse 84   Temp 36.1 °C (96.9 °F) (Temporal)   Resp 14   Ht 1.727 m (5' 8\")   Wt 61.7 kg (136 lb 0.4 oz)   SpO2 96%   BMI 20.68 kg/m²     "

## 2024-05-14 NOTE — ED PROVIDER NOTES
ER Provider Note    Scribed for Willy Evans M.D. by Emily Chamorro. 5/14/2024   8:34 AM    Primary Care Provider: None noted    CHIEF COMPLAINT  Chief Complaint   Patient presents with    Wound Check     Patient states he has had persistent leg infections for past 3 years and states that for past week it has flared up. Scabs noted to bilateral legs. Hx psoriasis     EXTERNAL RECORDS REVIEWED  The patient was last here and admitted on 10/22/23 for dyspnea on exertion.     HPI/ROS  Dio Ramirez is a 54 y.o. male who presents to the ED for evaluation of bilateral leg wounds onset 2 weeks ago. He states he has had similar symptoms intermittently for the last 3 years. He notes he has a history of psoriasis. No alleviating factors attempted.     PAST MEDICAL HISTORY  Past Medical History:   Diagnosis Date    Bipolar 2 disorder (HCC) 02/23/2012    History of COVID-19     Polysubstance abuse (HCC)     Psoriasis        SURGICAL HISTORY  History reviewed. No pertinent surgical history.    FAMILY HISTORY  Family History   Problem Relation Age of Onset    Other Mother         epilepsy    Alcohol/Drug Father        SOCIAL HISTORY   reports that he has quit smoking. His smoking use included cigarettes. He has never used smokeless tobacco. He reports current drug use. Drug: Inhaled. He reports that he does not drink alcohol.    CURRENT MEDICATIONS  Previous Medications    CYANOCOBALAMIN (VITAMIN B12) 1000 MCG TAB    Take 1 Tablet by mouth every day.    CYANOCOBALAMIN (VITAMIN B12) 1000 MCG TAB CR    Take 1 tablet orally daily for dietary supplement    LEVOTHYROXINE (SYNTHROID) 25 MCG TAB    Take 1 Tablet by mouth every morning on an empty stomach.    PALIPERIDONE PALMITATE ER (INVEGA SUSTENNA) 156 MG/ML SUSPENSION PREFILLED SYRINGE    Inject 1 ml intramuscularly one time on day 8 for mood stability    PALIPERIDONE PALMITATE ER (INVEGA SUSTENNA) 156 MG/ML SUSPENSION PREFILLED SYRINGE    Inject 1 ml intramuscularly one  "time on day 8 for mood stability.    PALIPERIDONE PALMITATE ER (INVEGA SUSTENNA) 156 MG/ML SUSPENSION PREFILLED SYRINGE    Inject 1 ml intramuscularly one time on day 8 for mood stability    PRAZOSIN (MINIPRESS) 2 MG CAP    Take 1 capsule orally daily for nightmares    RISPERIDONE (RISPERDAL) 1 MG TAB    Take 1 Tablet by mouth at bedtime.    SERTRALINE (ZOLOFT) 25 MG TABLET    Take 1 Tablet by mouth every day.    SERTRALINE (ZOLOFT) 50 MG TAB    Take 1 tablet orally daily for depression, anxiety       ALLERGIES  None noted     PHYSICAL EXAM  BP 94/61   Pulse 84   Temp 36.1 °C (96.9 °F) (Temporal)   Resp 14   Ht 1.727 m (5' 8\")   Wt 61.7 kg (136 lb 0.4 oz)   SpO2 96%   BMI 20.68 kg/m²    Nursing note and vitals reviewed.  Constitutional: Well-developed and well-nourished. No distress.   HENT: Head is normocephalic and atraumatic. Oropharynx is clear and moist without exudate or erythema.   Eyes: Pupils are equal, round, and reactive to light. Conjunctiva are normal.   Cardiovascular: Normal rate and regular rhythm. No murmur heard. Normal radial pulses.  Pulmonary/Chest: Breath sounds normal. No wheezes or rales.   Abdominal: Soft and non-tender. No distention    Musculoskeletal: Extremities exhibit normal range of motion without edema or tenderness.   Neurological: Awake, alert and oriented to person, place, and time. No focal deficits noted.  Skin: numerous open wounds on bilateral lower extremities with surrounding erythema and taylor crusting, no abscess  Psychiatric: Normal mood and affect. Appropriate for clinical situation    INITIAL ASSESSMENT AND PLAN    8:34 AM - Patient was evaluated at bedside for bilateral lower leg wounds. I informed him of the plan for discharge home with antibiotics for cellulitis. He was given strict return precautions. Patient verbalizes understanding and support with my plan of care.         DISPOSITION AND DISCUSSIONS    Escalation of care considered, and ultimately not " performed: acute inpatient care management, however at this time, the patient is most appropriate for outpatient management.    Barriers to care at this time, including but not limited to: Patient does not have established PCP.     Decision tools and prescription drugs considered including, but not limited to: Antibiotics Doxycyline .    DISPOSITION:  Patient will be discharged home in stable condition.    FOLLOW UP:  [facility], Emergency Dept  [address] [facility] [address] [phone]    If symptoms worsen      OUTPATIENT MEDICATIONS:  New Prescriptions    DOXYCYCLINE (MONODOX) 100 MG CAPSULE    Take 1 Capsule by mouth 2 times a day for 14 days.       FINAL DIAGNOSIS  1. Wound infection    2. Cellulitis of lower extremity, unspecified laterality         Emily HARO (Scribnallely), am scribing for, and in the presence of, Willy Evans M.D..    Electronically signed by: Emily Chamorro (Cristinaibnallely), 5/14/2024    Willy HARO M.D. personally performed the services described in this documentation, as scribed by Emily Chamorro in my presence, and it is both accurate and complete.      The note accurately reflects work and decisions made by me.  Willy Evans M.D.  5/14/2024  2:27 PM

## 2024-05-14 NOTE — ED NOTES
Pt. Able to fully dress self and verbalize understanding of discharge instructions and need to take entire round of abx.  Pt. Ambulated out of ED with steady gait.

## 2024-06-12 PROCEDURE — RXMED WILLOW AMBULATORY MEDICATION CHARGE: Performed by: NURSE PRACTITIONER

## 2024-06-17 ENCOUNTER — PHARMACY VISIT (OUTPATIENT)
Dept: PHARMACY | Facility: MEDICAL CENTER | Age: 54
End: 2024-06-17
Payer: COMMERCIAL

## 2024-07-11 ENCOUNTER — PHARMACY VISIT (OUTPATIENT)
Dept: PHARMACY | Facility: MEDICAL CENTER | Age: 54
End: 2024-07-11
Payer: COMMERCIAL

## 2024-07-11 PROCEDURE — RXMED WILLOW AMBULATORY MEDICATION CHARGE: Performed by: NURSE PRACTITIONER

## 2024-07-11 RX ORDER — PRAZOSIN HYDROCHLORIDE 2 MG/1
CAPSULE ORAL
Qty: 30 CAPSULE | Refills: 1 | OUTPATIENT
Start: 2024-07-11

## 2024-07-11 RX ORDER — PALIPERIDONE PALMITATE 156 MG/ML
INJECTION INTRAMUSCULAR
Qty: 1 ML | Refills: 1 | OUTPATIENT
Start: 2024-07-11

## 2024-07-11 RX ORDER — LEVOTHYROXINE SODIUM 25 UG/1
CAPSULE ORAL
Qty: 30 CAPSULE | Refills: 1 | OUTPATIENT
Start: 2024-07-11

## 2025-06-04 ENCOUNTER — APPOINTMENT (OUTPATIENT)
Dept: RADIOLOGY | Facility: MEDICAL CENTER | Age: 55
End: 2025-06-04
Attending: EMERGENCY MEDICINE
Payer: COMMERCIAL

## 2025-06-04 ENCOUNTER — HOSPITAL ENCOUNTER (EMERGENCY)
Facility: MEDICAL CENTER | Age: 55
End: 2025-06-05
Attending: EMERGENCY MEDICINE
Payer: COMMERCIAL

## 2025-06-04 DIAGNOSIS — I60.9 SUBARACHNOID HEMORRHAGE (HCC): ICD-10-CM

## 2025-06-04 DIAGNOSIS — F15.10 METHAMPHETAMINE ABUSE (HCC): ICD-10-CM

## 2025-06-04 DIAGNOSIS — F31.9 BIPOLAR AFFECTIVE DISORDER, REMISSION STATUS UNSPECIFIED (HCC): ICD-10-CM

## 2025-06-04 DIAGNOSIS — R55 SYNCOPE, UNSPECIFIED SYNCOPE TYPE: Primary | ICD-10-CM

## 2025-06-04 DIAGNOSIS — L40.9 PSORIASIS: ICD-10-CM
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
## 2025-06-04 LAB — EKG IMPRESSION: NORMAL

## 2025-06-04 PROCEDURE — 96375 TX/PRO/DX INJ NEW DRUG ADDON: CPT | Mod: XU

## 2025-06-04 PROCEDURE — 85730 THROMBOPLASTIN TIME PARTIAL: CPT

## 2025-06-04 PROCEDURE — 96374 THER/PROPH/DIAG INJ IV PUSH: CPT | Mod: XU

## 2025-06-04 PROCEDURE — 70450 CT HEAD/BRAIN W/O DYE: CPT

## 2025-06-04 PROCEDURE — 85610 PROTHROMBIN TIME: CPT

## 2025-06-04 PROCEDURE — 93005 ELECTROCARDIOGRAM TRACING: CPT | Mod: TC

## 2025-06-04 PROCEDURE — 71045 X-RAY EXAM CHEST 1 VIEW: CPT

## 2025-06-04 PROCEDURE — 87040 BLOOD CULTURE FOR BACTERIA: CPT

## 2025-06-04 PROCEDURE — 99285 EMERGENCY DEPT VISIT HI MDM: CPT

## 2025-06-04 PROCEDURE — 84484 ASSAY OF TROPONIN QUANT: CPT

## 2025-06-04 PROCEDURE — 85025 COMPLETE CBC W/AUTO DIFF WBC: CPT

## 2025-06-04 PROCEDURE — 36415 COLL VENOUS BLD VENIPUNCTURE: CPT

## 2025-06-04 PROCEDURE — 80053 COMPREHEN METABOLIC PANEL: CPT

## 2025-06-04 PROCEDURE — 93005 ELECTROCARDIOGRAM TRACING: CPT | Mod: TC | Performed by: EMERGENCY MEDICINE

## 2025-06-04 RX ORDER — DIPHENHYDRAMINE HYDROCHLORIDE 50 MG/ML
50 INJECTION, SOLUTION INTRAMUSCULAR; INTRAVENOUS ONCE
Status: COMPLETED | OUTPATIENT
Start: 2025-06-04 | End: 2025-06-05

## 2025-06-04 RX ORDER — CEFAZOLIN 2 G/1
2 INJECTION, POWDER, FOR SOLUTION INTRAMUSCULAR; INTRAVENOUS ONCE
Status: COMPLETED | OUTPATIENT
Start: 2025-06-04 | End: 2025-06-05

## 2025-06-04 RX ORDER — SODIUM CHLORIDE 9 MG/ML
1000 INJECTION, SOLUTION INTRAVENOUS ONCE
Status: COMPLETED | OUTPATIENT
Start: 2025-06-04 | End: 2025-06-05

## 2025-06-04 RX ORDER — SODIUM CHLORIDE 9 MG/ML
INJECTION, SOLUTION INTRAVENOUS CONTINUOUS
Status: DISCONTINUED | OUTPATIENT
Start: 2025-06-04 | End: 2025-06-05 | Stop reason: HOSPADM

## 2025-06-04 ASSESSMENT — FIBROSIS 4 INDEX: FIB4 SCORE: 1.077720502487301427

## 2025-06-04 ASSESSMENT — PAIN DESCRIPTION - PAIN TYPE: TYPE: ACUTE PAIN

## 2025-06-05 ENCOUNTER — APPOINTMENT (OUTPATIENT)
Dept: RADIOLOGY | Facility: MEDICAL CENTER | Age: 55
End: 2025-06-05
Attending: EMERGENCY MEDICINE
Payer: COMMERCIAL

## 2025-06-05 ENCOUNTER — PHARMACY VISIT (OUTPATIENT)
Dept: PHARMACY | Facility: MEDICAL CENTER | Age: 55
End: 2025-06-05
Payer: COMMERCIAL

## 2025-06-05 VITALS
BODY MASS INDEX: 21.98 KG/M2 | DIASTOLIC BLOOD PRESSURE: 70 MMHG | SYSTOLIC BLOOD PRESSURE: 112 MMHG | HEIGHT: 68 IN | RESPIRATION RATE: 12 BRPM | HEART RATE: 80 BPM | OXYGEN SATURATION: 96 % | TEMPERATURE: 98.4 F | WEIGHT: 145 LBS

## 2025-06-05 LAB
ALBUMIN SERPL BCP-MCNC: 4.3 G/DL (ref 3.2–4.9)
ALBUMIN/GLOB SERPL: 1.1 G/DL
ALP SERPL-CCNC: 80 U/L (ref 30–99)
ALT SERPL-CCNC: 9 U/L (ref 2–50)
AMPHET UR QL SCN: POSITIVE
ANION GAP SERPL CALC-SCNC: 10 MMOL/L (ref 7–16)
APPEARANCE UR: CLEAR
APTT PPP: 32.7 SEC (ref 24.7–36)
AST SERPL-CCNC: 18 U/L (ref 12–45)
BACTERIA #/AREA URNS HPF: ABNORMAL /HPF
BARBITURATES UR QL SCN: NEGATIVE
BASOPHILS # BLD AUTO: 0.5 % (ref 0–1.8)
BASOPHILS # BLD: 0.05 K/UL (ref 0–0.12)
BENZODIAZ UR QL SCN: NEGATIVE
BILIRUB SERPL-MCNC: 0.6 MG/DL (ref 0.1–1.5)
BILIRUB UR QL STRIP.AUTO: NEGATIVE
BUN SERPL-MCNC: 12 MG/DL (ref 8–22)
BZE UR QL SCN: NEGATIVE
CALCIUM ALBUM COR SERPL-MCNC: 9.1 MG/DL (ref 8.5–10.5)
CALCIUM SERPL-MCNC: 9.3 MG/DL (ref 8.5–10.5)
CANNABINOIDS UR QL SCN: NEGATIVE
CASTS URNS QL MICRO: ABNORMAL /LPF (ref 0–2)
CHLORIDE SERPL-SCNC: 99 MMOL/L (ref 96–112)
CO2 SERPL-SCNC: 24 MMOL/L (ref 20–33)
COLOR UR: YELLOW
CREAT SERPL-MCNC: 1.08 MG/DL (ref 0.5–1.4)
EOSINOPHIL # BLD AUTO: 0.48 K/UL (ref 0–0.51)
EOSINOPHIL NFR BLD: 5.1 % (ref 0–6.9)
EPITHELIAL CELLS 1715: ABNORMAL /HPF (ref 0–5)
ERYTHROCYTE [DISTWIDTH] IN BLOOD BY AUTOMATED COUNT: 39.4 FL (ref 35.9–50)
FENTANYL UR QL: NEGATIVE
GFR SERPLBLD CREATININE-BSD FMLA CKD-EPI: 80 ML/MIN/1.73 M 2
GLOBULIN SER CALC-MCNC: 3.9 G/DL (ref 1.9–3.5)
GLUCOSE SERPL-MCNC: 123 MG/DL (ref 65–99)
GLUCOSE UR STRIP.AUTO-MCNC: NEGATIVE MG/DL
HCT VFR BLD AUTO: 41.2 % (ref 42–52)
HGB BLD-MCNC: 13.9 G/DL (ref 14–18)
IMM GRANULOCYTES # BLD AUTO: 0.04 K/UL (ref 0–0.11)
IMM GRANULOCYTES NFR BLD AUTO: 0.4 % (ref 0–0.9)
INR PPP: 1.17 (ref 0.87–1.13)
KETONES UR STRIP.AUTO-MCNC: NEGATIVE MG/DL
LEUKOCYTE ESTERASE UR QL STRIP.AUTO: NEGATIVE
LYMPHOCYTES # BLD AUTO: 1.4 K/UL (ref 1–4.8)
LYMPHOCYTES NFR BLD: 14.8 % (ref 22–41)
MCH RBC QN AUTO: 28.7 PG (ref 27–33)
MCHC RBC AUTO-ENTMCNC: 33.7 G/DL (ref 32.3–36.5)
MCV RBC AUTO: 84.9 FL (ref 81.4–97.8)
METHADONE UR QL SCN: NEGATIVE
MICRO URNS: ABNORMAL
MONOCYTES # BLD AUTO: 0.72 K/UL (ref 0–0.85)
MONOCYTES NFR BLD AUTO: 7.6 % (ref 0–13.4)
NEUTROPHILS # BLD AUTO: 6.74 K/UL (ref 1.82–7.42)
NEUTROPHILS NFR BLD: 71.6 % (ref 44–72)
NITRITE UR QL STRIP.AUTO: NEGATIVE
NRBC # BLD AUTO: 0 K/UL
NRBC BLD-RTO: 0 /100 WBC (ref 0–0.2)
OPIATES UR QL SCN: NEGATIVE
OXYCODONE UR QL SCN: NEGATIVE
PCP UR QL SCN: NEGATIVE
PH UR STRIP.AUTO: 6 [PH] (ref 5–8)
PLATELET # BLD AUTO: 261 K/UL (ref 164–446)
PMV BLD AUTO: 8.6 FL (ref 9–12.9)
POTASSIUM SERPL-SCNC: 3.9 MMOL/L (ref 3.6–5.5)
PROPOXYPH UR QL SCN: NEGATIVE
PROT SERPL-MCNC: 8.2 G/DL (ref 6–8.2)
PROT UR QL STRIP: NEGATIVE MG/DL
PROTHROMBIN TIME: 14.9 SEC (ref 12–14.6)
RBC # BLD AUTO: 4.85 M/UL (ref 4.7–6.1)
RBC # URNS HPF: ABNORMAL /HPF (ref 0–2)
RBC UR QL AUTO: ABNORMAL
SODIUM SERPL-SCNC: 133 MMOL/L (ref 135–145)
SP GR UR STRIP.AUTO: 1.01
TROPONIN T SERPL-MCNC: <6 NG/L (ref 6–19)
UROBILINOGEN UR STRIP.AUTO-MCNC: 1 EU/DL
WBC # BLD AUTO: 9.4 K/UL (ref 4.8–10.8)
WBC #/AREA URNS HPF: ABNORMAL /HPF

## 2025-06-05 PROCEDURE — 70496 CT ANGIOGRAPHY HEAD: CPT

## 2025-06-05 PROCEDURE — 700117 HCHG RX CONTRAST REV CODE 255: Mod: UD | Performed by: EMERGENCY MEDICINE

## 2025-06-05 PROCEDURE — 304217 HCHG IRRIGATION SYSTEM

## 2025-06-05 PROCEDURE — 81001 URINALYSIS AUTO W/SCOPE: CPT | Mod: XU

## 2025-06-05 PROCEDURE — 87040 BLOOD CULTURE FOR BACTERIA: CPT

## 2025-06-05 PROCEDURE — 700105 HCHG RX REV CODE 258: Mod: UD | Performed by: EMERGENCY MEDICINE

## 2025-06-05 PROCEDURE — RXMED WILLOW AMBULATORY MEDICATION CHARGE: Performed by: EMERGENCY MEDICINE

## 2025-06-05 PROCEDURE — 80307 DRUG TEST PRSMV CHEM ANLYZR: CPT

## 2025-06-05 PROCEDURE — 700111 HCHG RX REV CODE 636 W/ 250 OVERRIDE (IP): Performed by: EMERGENCY MEDICINE

## 2025-06-05 RX ORDER — HYDROXYZINE HYDROCHLORIDE 50 MG/1
50 TABLET, FILM COATED ORAL EVERY 6 HOURS PRN
Qty: 40 TABLET | Refills: 0 | Status: SHIPPED | OUTPATIENT
Start: 2025-06-05

## 2025-06-05 RX ORDER — CEPHALEXIN 500 MG/1
500 CAPSULE ORAL 4 TIMES DAILY
Qty: 40 CAPSULE | Refills: 0 | Status: ACTIVE | OUTPATIENT
Start: 2025-06-05

## 2025-06-05 RX ADMIN — SODIUM CHLORIDE: 9 INJECTION, SOLUTION INTRAVENOUS at 02:33

## 2025-06-05 RX ADMIN — SODIUM CHLORIDE 1000 ML: 9 INJECTION, SOLUTION INTRAVENOUS at 00:43

## 2025-06-05 RX ADMIN — CEFAZOLIN 2 G: 2 INJECTION, POWDER, LYOPHILIZED, FOR SOLUTION INTRAVENOUS at 02:02

## 2025-06-05 RX ADMIN — DIPHENHYDRAMINE HYDROCHLORIDE 50 MG: 50 INJECTION, SOLUTION INTRAMUSCULAR; INTRAVENOUS at 01:59

## 2025-06-05 RX ADMIN — IOHEXOL 80 ML: 350 INJECTION, SOLUTION INTRAVENOUS at 01:47

## 2025-06-05 NOTE — ED NOTES
Pt discharged, all appropriate hospital equipment removed (IV, monitor, pulse ox, etc.). Pt left unit via ambulation with self to home for self-care. Personal belongings with pt when leaving unit. Pt given discharge instructions prior to leaving ER, including where to  prescriptions and when to follow-up if applicable; verbalizes understanding. Pt informed to return to ED if symptoms worsen/return or altered status develop. Copy of discharge instructions signed and turned into DC basket and copy sent with pt. MTM called for pt.

## 2025-06-05 NOTE — DISCHARGE INSTRUCTIONS
You need to stop using methamphetamines or you are going to die!!!  These are the reason you have a bleed in your brain which could kill you.  Keep your legs clean with antibacterial soap and water, pat dry and apply Neosporin daily  Take the medication I'm prescribing you as directed for the itching and the infection.

## 2025-06-05 NOTE — ED NOTES
Pt changed into gown and placed on cardiac monitor. Pt has extensive psoriasis wounds in BLE.  Call light within reach and POC discussed with pt. Chart up for ERP.

## 2025-06-05 NOTE — ED NOTES
PIV established and blood samples sent to lab. Call light within reach. Pt provided with soda Ok per ERP. Phleb messaged for second set of blood cultures.

## 2025-06-05 NOTE — ED TRIAGE NOTES
"Chief Complaint   Patient presents with    Syncope    GLF     Pt presented with a ground level fall +HS, -thinner. +LOC. Pt denies pain and does not remember the fall. Roommate called EMS after witnessing the fall. EMS denies any seizure activity. Pt transferred from [facility] sober [facility].        [facility] for above complaint.     Past Medical History[1]    EKG protocols ordered.     /76   Pulse 75   Resp 16   Ht 1.727 m (5' 8\")   Wt 65.8 kg (145 lb)   SpO2 94%   BMI 22.05 kg/m²           [1]   Past Medical History:  Diagnosis Date    Bipolar 2 disorder (HCC) 02/23/2012    History of COVID-19     Polysubstance abuse (HCC)     Psoriasis      "

## 2025-06-05 NOTE — ED PROVIDER NOTES
ER Provider Note    Scribed for SAV FriendO. by Rivas Cueto. 6/4/2025  11:02 PM    Primary Care Provider: Pcp Pt States None    CHIEF COMPLAINT  Chief Complaint   Patient presents with    Syncope    GLF     Pt presented with a ground level fall +HS, -thinner. +LOC. Pt denies pain and does not remember the fall. Roommate called EMS after witnessing the fall. EMS denies any seizure activity. Pt transferred from [facility].       EXTERNAL RECORDS REVIEWED  Other Patient last seen in the ED 5/14/2025 for cellulitis of his bilateral lower extremities.     HPI/ROS  LIMITATION TO HISTORY   Select: : None    OUTSIDE HISTORIAN(S):  None    Dio Ramirez is a 55 y.o. male who presents to the ED via EMS from [facility] for evaluation of a GLF, onset prior to arrival. The patient does not recall the event, but he was advised by his roommate he head a syncopal episode, fell, and struck his head. He denies a history of seizures. He denies headache, neck pain, nausea, or any injuries. He denies a history of alcohol or drug use. He notes a family history of epilepsy. He is unsure of last tetanus. He denies blood thinner use.       PAST MEDICAL HISTORY  Past Medical History[1]    SURGICAL HISTORY  Past Surgical History[2]    FAMILY HISTORY  Family History   Problem Relation Age of Onset    Other Mother         epilepsy    Alcohol/Drug Father        SOCIAL HISTORY   reports that he has quit smoking. His smoking use included cigarettes. He has never used smokeless tobacco. He reports that he does not currently use drugs after having used the following drugs: Inhaled. He reports that he does not drink alcohol.    CURRENT MEDICATIONS  Discharge Medication List as of 6/5/2025  5:06 AM        CONTINUE these medications which have NOT CHANGED    Details   !! Paliperidone Palmitate ER (INVEGA SUSTENNA) 156 MG/ML Suspension Prefilled Syringe Inject 1 ml intramuscularly one time for mood [id number]  dose for Invega Sustenna initiation. IMPORTANT- Apply Patient Savings Program:BIN:[id number], N:SC1, Group:[id number], ID#[id number], [facility]. Questions: [phone]-1 mL, R-1, Normal      Levothyroxine Sodium 25 MCG Cap Take 1 capsule by mouth daily for hypothyroidsimIMPORTANT- Apply Patient Savings Program:BIN:[id number], N:[facility], Group:[id number], ID#[id number], Submit as primary insurance to [employer] adjudicator. Questions: [phone]-30 Capsule, R-1, Normal      !! prazosin (MINIPRESS) 2 MG Cap Take 1 capsule orally daily for nightmaresIMPORTANT- Apply Patient Savings Program:BIN:[id number], N:[facility], Group:[id number], ID#[id number], Submit as primary insurance to [employer] adjudicator. Questions: [phone]-30 Capsule, R-1, Normal      !! sertraline (ZOLOFT) 50 MG Tab Take 1 tablet orally daily for depression, anxietIMPORTANT- Apply Patient Savings Program:BIN:[id number], N:[facility], Group:[id number], ID#[id number], Submit as primary insurance to [employer] adjudicator. Questions: [phone]-30 Tablet, R-1, Normal      !! Cyanocobalamin (VITAMIN B12) 1000 MCG Tab CR Take 1 tablet orally daily for dietary supplementIMPORTANT- Apply Patient Savings Program:BIN:[id number], N:[facility], Group:[id number], ID#[id number], Submit as primary insurance to [employer] adjudicator. Questions: [phone]-30 Tablet, R-1, Normal      !! prazosin (MINIPRESS) 2 MG Cap Take 1 Capsule by mouth every day for nightmaresIMPORTANT- Apply Patient Savings Program:BIN:[id number], N:[facility], Group:[id number], ID#[id number], Submit as primary insurance to [employer] adjudicator. Questions: [phone]-30 Capsule, R-1, Normal      !! sertraline (ZOLOFT) 50 MG Tab Take 1 Tablet by mouth every day for depression, anxietyIMPORTANT- Apply Patient Savings Program:BIN:[id number], PCN:THEA, Group:[id number], ID#[id number], Submit as primary insurance to [facility] adjudicator. Questions: [phone]-30 Tablet, R-1, Normal      !!  Cyanocobalamin (VITAMIN B12) 1000 MCG Tab CR Take 1 tablet by mouth daily for dietary supplementIMPORTANT- Apply Patient Savings Program:BIN:[id number], PCN:SC1, Group:[id number], ID#[id number], Sub[facility]. Questions: [phone]-30 Tablet, R-1, Normal      !! Paliperidone Palmitate ER (INVEGA SUSTENNA) 156 MG/ML Suspension Prefilled Syringe Inject 1 ml intramuscularly one time on day 8 for mood [id number] dose for Invega Sustenna initiation. IMPORTANT- Apply Patient Savings Program:BIN:[id number], PCN:SC1, Group:[id number], ID#[id number], Submit as primary [facility]. Que stions: [phone]-1 mL, R-1, Normal      !! Paliperidone Palmitate ER (INVEGA SUSTENNA) 156 MG/ML Suspension Prefilled Syringe Inject 1 ml intramuscularly one time on day 8 for mood [id number] dose for Invega Sustenna initiation. IMPORTANT- Apply Patient Savings Program:BIN:[id number], PCN:SC1, Group:[id number], ID#[id number], Submit as primary insurance to [facility]. Que stions: [phone]-1 mL, R-1, Normal      !! prazosin (MINIPRESS) 2 MG Cap Take 1 capsule orally daily for nightmaresIMPORTANT- Apply Patient Savings Program:BIN:[id number], N:SC1, Group:[id number], ID#[id number], Submit as primary insurance to [facility]. Questions: [phone]-30 Capsule, R-1, Normal      !! sertraline (ZOLOFT) 50 MG Tab Take 1 tablet orally daily for depression, anxietyIMPORTANT- Apply Patient Savings Program:BIN:[id number], PCN:SC1, Group:[id number], ID#[id number], Submit as primary insurance [facility]. Questions: [phone]-30 Tablet, R-1, Normal      !! Cyanocobalamin (VITAMIN B12) 1000 MCG Tab CR Take 1 tablet orally daily for dietary supplementIMPORTANT- Apply Patient Savings Program:BIN:[id number], PCN:SC1, Group:[id number], ID#[id number], Submit as primary insurance to [facility] adjudicator. Questions: [phone]-30 Tablet, R-1, Normal      !! Paliperidone  "Palmitate ER (INVEGA SUSTENNA) 156 MG/ML Suspension Prefilled Syringe Inject 1 ml intramuscularly one time on day 8 for mood stability.2nd dose for Invega Sustenna initiation. IMPORTANT- Apply Patient Savings Program:BIN:[id number], PCN:SC1, Group:[id number], ID#[id number], Submit as primary insurance to [facility] adjudicator. Qu estions: [phone]-1 mL, R-1, Normal      !! Paliperidone Palmitate ER (INVEGA SUSTENNA) 156 MG/ML Suspension Prefilled Syringe Inject 1 ml intramuscularly one time on day 8 for mood [id number] dose for Invega Sustenna initiation. IMPORTANT- Apply Patient Savings Program:BIN:[id number], KYREEN:SC1, Group:[id number], ID#[id number], Submit as primary insurance to [facility] adjudicator. Que stions: [phone]-1 mL, R-1, Normal      cyanocobalamin (VITAMIN B12) 1000 MCG Tab Take 1 Tablet by mouth every day., Disp-30 Tablet, R-0, Normal      levothyroxine (SYNTHROID) 25 MCG Tab Take 1 Tablet by mouth every morning on an empty stomach., Disp-30 Tablet, R-0, Normal      risperiDONE (RISPERDAL) 1 MG Tab Take 1 Tablet by mouth at bedtime., Disp-30 Tablet, R-0, Normal      !! sertraline (ZOLOFT) 25 MG tablet Take 1 Tablet by mouth every day., Disp-30 Tablet, R-0, Normal       !! - Potential duplicate medications found. Please discuss with provider.          ALLERGIES  Patient has no known allergies.    PHYSICAL EXAM  /76   Pulse 75   Temp 36.9 °C (98.4 °F) (Temporal)   Resp 16   Ht 1.727 m (5' 8\")   Wt 65.8 kg (145 lb)   SpO2 95%   BMI 22.05 kg/m²   Constitutional: Patient is well developed, well nourished.  Mild distress.   HENT: Normocephalic. Head shows no signs of trauma. Moist oral mucosa.   Neck: Supple and nontender.    Cardiovascular: Normal heart rate and Regular rhythm. No murmur.   Thorax & Lungs: Clear and equal breath sounds with good excursion. No respiratory distress, no rhonchi, wheezing or rales.   Abdomen: Bowel sounds normal in all four quadrants. Soft, nontender.   Skin: " Extensive erythematous psoriatic rash to bilateral lower extremities with areas of bleeding from scratching. Neurovascular intact.   Extremities: Peripheral pulses 4/4 No edema   Neurologic: Alert & oriented x 3, Normal motor function, Normal sensory function.   Psychiatric: Affect odd.        DIAGNOSTIC STUDIES & PROCEDURES    Labs:   Results for orders placed or performed during the hospital encounter of 25   EKG    Collection Time: 25 10:43 PM   Result Value Ref Range    Report       [facility] Emergency Dept.    Test Date:  2025  Pt Name:    LUIS MANUEL ORTEGA                Department: ER  MRN:        [id number]                      Room:       [facility]  Gender:     Male                         Technician: [id number]  :        1970                   Requested By:ER TRIAGE PROTOCOL  Order #:    [id number]                    Reading MD:    Measurements  Intervals                                Axis  Rate:       75                           P:          53  RI:         158                          QRS:        6  QRSD:       98                           T:          47  QT:         384  QTc:        429    Interpretive Statements  Sinus rhythm  RSR' in V1 or V2, right VCD or RVH  Compared to ECG 10/22/2023 10:32:41  ST (T wave) deviation no longer present     CBC WITH DIFFERENTIAL    Collection Time: 25 11:57 PM   Result Value Ref Range    WBC 9.4 4.8 - 10.8 K/uL    RBC 4.85 4.70 - 6.10 M/uL    Hemoglobin 13.9 (L) 14.0 - 18.0 g/dL    Hematocrit 41.2 (L) 42.0 - 52.0 %    MCV 84.9 81.4 - 97.8 fL    MCH 28.7 27.0 - 33.0 pg    MCHC 33.7 32.3 - 36.5 g/dL    RDW 39.4 35.9 - 50.0 fL    Platelet Count 261 164 - 446 K/uL    MPV 8.6 (L) 9.0 - 12.9 fL    Neutrophils-Polys 71.60 44.00 - 72.00 %    Lymphocytes 14.80 (L) 22.00 - 41.00 %    Monocytes 7.60 0.00 - 13.40 %    Eosinophils 5.10 0.00 - 6.90 %    Basophils 0.50 0.00 - 1.80 %    Immature Granulocytes 0.40 0.00 - 0.90 %    Nucleated RBC  0.00 0.00 - 0.20 /100 WBC    Neutrophils (Absolute) 6.74 1.82 - 7.42 K/uL    Lymphs (Absolute) 1.40 1.00 - 4.80 K/uL    Monos (Absolute) 0.72 0.00 - 0.85 K/uL    Eos (Absolute) 0.48 0.00 - 0.51 K/uL    Baso (Absolute) 0.05 0.00 - 0.12 K/uL    Immature Granulocytes (abs) 0.04 0.00 - 0.11 K/uL    NRBC (Absolute) 0.00 K/uL   COMP METABOLIC PANEL    Collection Time: 06/04/25 11:57 PM   Result Value Ref Range    Sodium 133 (L) 135 - 145 mmol/L    Potassium 3.9 3.6 - 5.5 mmol/L    Chloride 99 96 - 112 mmol/L    Co2 24 20 - 33 mmol/L    Anion Gap 10.0 7.0 - 16.0    Glucose 123 (H) 65 - 99 mg/dL    Bun 12 8 - 22 mg/dL    Creatinine 1.08 0.50 - 1.40 mg/dL    Calcium 9.3 8.5 - 10.5 mg/dL    Correct Calcium 9.1 8.5 - 10.5 mg/dL    AST(SGOT) 18 12 - 45 U/L    ALT(SGPT) 9 2 - 50 U/L    Alkaline Phosphatase 80 30 - 99 U/L    Total Bilirubin 0.6 0.1 - 1.5 mg/dL    Albumin 4.3 3.2 - 4.9 g/dL    Total Protein 8.2 6.0 - 8.2 g/dL    Globulin 3.9 (H) 1.9 - 3.5 g/dL    A-G Ratio 1.1 g/dL   BLOOD CULTURE    Collection Time: 06/04/25 11:57 PM    Specimen: Peripheral; Blood   Result Value Ref Range    Significant Indicator NEG     Source BLD     Site PERIPHERAL     Culture Result       No Growth  Note: Blood cultures are incubated for 5 days and  are monitored continuously.Positive blood cultures  are called to the RN and reported as soon as  they are identified.     TROPONIN    Collection Time: 06/04/25 11:57 PM   Result Value Ref Range    Troponin T <6 6 - 19 ng/L   APTT    Collection Time: 06/04/25 11:57 PM   Result Value Ref Range    APTT 32.7 24.7 - 36.0 sec   PROTHROMBIN TIME (INR)    Collection Time: 06/04/25 11:57 PM   Result Value Ref Range    PT 14.9 (H) 12.0 - 14.6 sec    INR 1.17 (H) 0.87 - 1.13   ESTIMATED GFR    Collection Time: 06/04/25 11:57 PM   Result Value Ref Range    GFR (CKD-EPI) 80 >60 mL/min/1.73 m 2   URINALYSIS (UA)    Collection Time: 06/05/25 12:55 AM    Specimen: Urine   Result Value Ref Range    Color Yellow      Character Clear     Specific Gravity 1.012 <1.035    Ph 6.0 5.0 - 8.0    Glucose Negative Negative mg/dL    Ketones Negative Negative mg/dL    Protein Negative Negative mg/dL    Bilirubin Negative Negative    Urobilinogen, Urine 1.0 <=1.0 EU/dL    Nitrite Negative Negative    Leukocyte Esterase Negative Negative    Occult Blood Trace (A) Negative    Micro Urine Req Microscopic    URINE DRUG SCREEN    Collection Time: 06/05/25 12:55 AM   Result Value Ref Range    Amphetamines Urine Positive (A) Negative    Barbiturates Negative Negative    Benzodiazepines Negative Negative    Cocaine Metabolite Negative Negative    Fentanyl, Urine Negative Negative    Methadone Negative Negative    Opiates Negative Negative    Oxycodone Negative Negative    Phencyclidine -Pcp Negative Negative    Propoxyphene Negative Negative    Cannabinoid Metab Negative Negative   URINE MICROSCOPIC (W/UA)    Collection Time: 06/05/25 12:55 AM   Result Value Ref Range    WBC 0-2 /hpf    RBC 3-5 (A) 0 - 2 /hpf    Bacteria None Seen None /hpf    Epithelial Cells 0-2 0 - 5 /hpf    Urine Casts 0-2 0 - 2 /lpf   BLOOD CULTURE    Collection Time: 06/05/25  2:00 AM    Specimen: Peripheral; Blood   Result Value Ref Range    Significant Indicator NEG     Source BLD     Site PERIPHERAL     Culture Result       No Growth  Note: Blood cultures are incubated for 5 days and  are monitored continuously.Positive blood cultures  are called to the RN and reported as soon as  they are identified.          All labs reviewed by me.    EKG:   I have independently interpreted this EKG as shown above.      Radiology:   The attending Emergency Physician has independently interpreted the diagnostic imaging associated with this visit and is awaiting the final reading from the radiologist, which will be displayed below.    Preliminary interpretation is a follows: No acute cardiopulmonary disease on the chest x-ray.  The CT head shows a small subarachnoid  hemorrhage.  Radiologist interpretation:    CT-CTV HEAD WITH & W/O POST PROCESS   Final Result      There is no evidence of dural venous sinus thrombosis.      CT-CTA HEAD WITH & W/O-POST PROCESS   Final Result      1.  Subtle subarachnoid hemorrhage noted previously is less evident on the current exam.   2.  No new hemorrhage or acute infarct is evident.   3.  No significant stenosis, occlusion, or aneurysm.      DX-CHEST-PORTABLE (1 VIEW)   Final Result      No acute cardiac or pulmonary abnormalities are identified.      CT-HEAD W/O   Final Result      Small amount of subarachnoid hemorrhage in the right parafalcine region.         Based solely on CT findings, the brain injury guideline category is mBIG 1.      SDH < 4mm   IPH < 4mm   SAH < 3 sulci and < 1mm      The original BIG retrospective analysis found radiographic progression in 0% of BIG 1 patients and 2.6% BIG 2.         Findings were communicated to the ordering provider at the time of dictation via Voalte.            COURSE & MEDICAL DECISION MAKING    ED OBS: No; Patient does not meet criteria for ED Observation.      INITIAL ASSESSMENT AND PLAN  Care Narrative:       11:02 PM - Patient seen and evaluated at bedside. Discussed plan of care, including lab work, diagnostic imaging, and medication. Patient agrees to plan of care. Patient will be treated with Adacel 0.5 mL injection, Benadryl 50 mg injection, and NS 1 L infusion for his symptoms. Ordered EKG, Prothrombin Time, APTT, Troponin, Urine drug screen, UA, Blood culture x2, CMP, CBC w/ diff, CT-head, and DX-Chest to evaluate. Differential diagnoses include but are not limited to: Intracranial bleed    Laboratories were all unremarkable.  He is positive for methamphetamines.  Urinalysis is negative.  White blood cell count is normal H&H is stable, electrolytes are unremarkable.  CT head shows small subarachnoid hemorrhage which puts the patient in a big 1.    12:21 AM - I discussed the patient's  case and the above findings with Dr. Murguia (Stroke Neurology) who advised the patient has a subarachnoid hemorrhage in the right parafalcine region and requested I order CT-CTV Head and CT-CTA Head.    12:33 AM - Patient was reevaluated at bedside. Discussed lab and radiology results with the patient and updated them on the plan of care. Patient verbalizes understanding and agreement to this plan of care.  Patient's legs were cleansed with normal saline, Neosporin and sterile nonstick dressings were applied.  He was given Ancef 2 g IV piggyback.    12:38 AM - I discussed the patient's case and the above findings with Dr. De La Cruz (Trauma Services) who advised if the CT-CTV and CT-CTA Head shows no aneurysm, he will admit to trauma. He advised this is a BIG 1 and observation for 6 hours from time of fall.     4:06 AM - Patient was reevaluated at bedside. I discussed with the patient the plan for discharge. I advised the patient he needs to stop using methamphetamines as these are the reason he has a bleed in his brain which could kill him. I advised him to keep his legs clean with antibacterial soap and water, pat dry and apply Neosporin daily. I discussed with the patient he needs to take the hydroxyzine I'm prescribing him as directed for the itching and the infection. Patient was given the opportunity for questions. I addressed all questions or concerns at this time and they verbalize agreement to the plan of care.     HYDRATION: Based on the patient's presentation of Inability to take oral fluids the patient was given IV fluids. IV Hydration was used because oral hydration was not adequate alone. Upon recheck following hydration, the patient was improved.                   DISPOSITION AND DISCUSSIONS  I have discussed management of the patient with the following physicians and NIYA's: Dr. Cintron (Stroke Neurology), Dr. De La Cruz (Trauma Services)     Discussion of management with other Hospitals in [place] or appropriate source(s):  None     Escalation of care considered, and ultimately not performed: acute inpatient care management, however at this time, the patient is most appropriate for outpatient management.    Barriers to care at this time, including but not limited to: Patient does not have established PCP.     Decision tools and prescription drugs considered including, but not limited to: Keflex and Hydroxyzine.    The patient will return for new or worsening symptoms and is stable at the time of discharge.    The patient is referred to a primary physician for blood pressure management, diabetic screening, and for all other preventative health concerns.      DISPOSITION:  Patient will be discharged home in stable condition.    FOLLOW UP:  [facility]    In 2 days  For wound re-check      OUTPATIENT MEDICATIONS:  Discharge Medication List as of 6/5/2025  5:06 AM        START taking these medications    Details   cephALEXin (KEFLEX) 500 MG Cap Take 1 Capsule by mouth 4 times a day., Disp-40 Capsule, R-0, Normal      hydrOXYzine HCl (ATARAX) 50 MG Tab Take 1 Tablet by mouth every 6 hours as needed for Itching., Disp-40 Tablet, R-0, Normal                 FINAL IMPRESSION   1. Syncope, unspecified syncope type    2. Fall from ground level    3. Subarachnoid hemorrhage (HCC)    4. Bipolar affective disorder, remission status unspecified (HCC)    5. Psoriasis    6. Methamphetamine abuse (HCC)        Rvias HARO (Mariano), am scribing for, and in the presence of, Zara Lujan D.O..    Electronically signed by: Rivas Buchanan), 6/4/2025    Zara HARO D.O. personally performed the services described in this documentation, as scribed by Rivas Cueto in my presence, and it is both accurate and complete.    The note accurately reflects work and decisions made by me.  Zara Lujan D.O.  6/5/2025  5:41 AM         [1]   Past Medical History:  Diagnosis Date    Bipolar 2 disorder (HCC) 02/23/2012    History of COVID-19     Polysubstance  abuse (HCC)     Psoriasis    [2] History reviewed. No pertinent surgical history.

## 2025-07-15 DIAGNOSIS — L40.9 PSORIASIS: ICD-10-CM

## 2025-07-15 RX ORDER — HYDROXYZINE HYDROCHLORIDE 50 MG/1
50 TABLET, FILM COATED ORAL EVERY 6 HOURS PRN
Qty: 40 TABLET | Refills: 0 | Status: CANCELLED | OUTPATIENT
Start: 2025-07-15

## 2025-08-02 ENCOUNTER — HOSPITAL ENCOUNTER (EMERGENCY)
Facility: MEDICAL CENTER | Age: 55
End: 2025-08-02
Attending: EMERGENCY MEDICINE
Payer: COMMERCIAL

## 2025-08-02 ENCOUNTER — PHARMACY VISIT (OUTPATIENT)
Dept: PHARMACY | Facility: MEDICAL CENTER | Age: 55
End: 2025-08-02
Payer: COMMERCIAL

## 2025-08-02 VITALS
HEART RATE: 88 BPM | SYSTOLIC BLOOD PRESSURE: 151 MMHG | DIASTOLIC BLOOD PRESSURE: 85 MMHG | WEIGHT: 151.01 LBS | HEIGHT: 68 IN | OXYGEN SATURATION: 97 % | BODY MASS INDEX: 22.89 KG/M2 | TEMPERATURE: 97.2 F | RESPIRATION RATE: 16 BRPM

## 2025-08-02 DIAGNOSIS — L03.90 CELLULITIS, UNSPECIFIED CELLULITIS SITE: Primary | ICD-10-CM

## 2025-08-02 PROCEDURE — 99282 EMERGENCY DEPT VISIT SF MDM: CPT

## 2025-08-02 PROCEDURE — RXOTC WILLOW AMBULATORY OTC CHARGE

## 2025-08-02 PROCEDURE — RXMED WILLOW AMBULATORY MEDICATION CHARGE: Performed by: EMERGENCY MEDICINE

## 2025-08-02 RX ORDER — CEPHALEXIN 500 MG/1
500 CAPSULE ORAL 4 TIMES DAILY
Qty: 40 CAPSULE | Refills: 0 | Status: ACTIVE | OUTPATIENT
Start: 2025-08-02 | End: 2025-08-12

## 2025-08-02 RX ORDER — BENZOCAINE/MENTHOL 6 MG-10 MG
1 LOZENGE MUCOUS MEMBRANE 2 TIMES DAILY
Qty: 28 G | Refills: 0 | Status: SHIPPED | OUTPATIENT
Start: 2025-08-02 | End: 2025-08-12

## 2025-08-02 ASSESSMENT — FIBROSIS 4 INDEX: FIB4 SCORE: 1.29
